# Patient Record
Sex: MALE | Race: BLACK OR AFRICAN AMERICAN | Employment: OTHER | URBAN - METROPOLITAN AREA
[De-identification: names, ages, dates, MRNs, and addresses within clinical notes are randomized per-mention and may not be internally consistent; named-entity substitution may affect disease eponyms.]

---

## 2017-01-01 ENCOUNTER — IP HISTORICAL/CONVERTED ENCOUNTER (OUTPATIENT)
Dept: OTHER | Age: 67
End: 2017-01-01

## 2017-04-21 ENCOUNTER — OP HISTORICAL/CONVERTED ENCOUNTER (OUTPATIENT)
Dept: OTHER | Age: 67
End: 2017-04-21

## 2019-04-18 ENCOUNTER — OP HISTORICAL/CONVERTED ENCOUNTER (OUTPATIENT)
Dept: OTHER | Age: 69
End: 2019-04-18

## 2020-08-29 ENCOUNTER — HOSPITAL ENCOUNTER (INPATIENT)
Age: 70
LOS: 30 days | Discharge: NURSING, REHAB OR LONG-TERM CARE | DRG: 137 | End: 2020-09-28
Attending: FAMILY MEDICINE | Admitting: FAMILY MEDICINE
Payer: MEDICAID

## 2020-08-29 ENCOUNTER — IP HISTORICAL/CONVERTED ENCOUNTER (OUTPATIENT)
Dept: OTHER | Age: 70
End: 2020-08-29

## 2020-08-29 PROCEDURE — 99999999999 HC CONV PATIENT CONVENIENCE-OTHER

## 2020-08-29 PROCEDURE — 82553 CREATINE MB FRACTION: CPT

## 2020-08-29 PROCEDURE — U0002 COVID-19 LAB TEST NON-CDC: HCPCS

## 2020-08-29 PROCEDURE — 93005 ELECTROCARDIOGRAM TRACING: CPT

## 2020-08-29 PROCEDURE — 84484 ASSAY OF TROPONIN QUANT: CPT

## 2020-08-29 PROCEDURE — 94760 N-INVAS EAR/PLS OXIMETRY 1: CPT

## 2020-08-29 PROCEDURE — 83605 ASSAY OF LACTIC ACID: CPT

## 2020-08-29 PROCEDURE — 36415 COLL VENOUS BLD VENIPUNCTURE: CPT

## 2020-08-29 PROCEDURE — 81003 URINALYSIS AUTO W/O SCOPE: CPT

## 2020-08-29 PROCEDURE — 82805 BLOOD GASES W/O2 SATURATION: CPT

## 2020-08-29 PROCEDURE — 80053 COMPREHEN METABOLIC PANEL: CPT

## 2020-08-29 PROCEDURE — 85610 PROTHROMBIN TIME: CPT

## 2020-08-29 PROCEDURE — 80061 LIPID PANEL: CPT

## 2020-08-29 PROCEDURE — 85025 COMPLETE CBC W/AUTO DIFF WBC: CPT

## 2020-08-29 PROCEDURE — 87040 BLOOD CULTURE FOR BACTERIA: CPT

## 2020-08-29 PROCEDURE — 84443 ASSAY THYROID STIM HORMONE: CPT

## 2020-08-29 PROCEDURE — 71045 X-RAY EXAM CHEST 1 VIEW: CPT

## 2020-08-29 PROCEDURE — 84145 PROCALCITONIN (PCT): CPT

## 2020-08-29 PROCEDURE — 82550 ASSAY OF CK (CPK): CPT

## 2020-08-29 PROCEDURE — 83036 HEMOGLOBIN GLYCOSYLATED A1C: CPT

## 2020-08-29 PROCEDURE — 83880 ASSAY OF NATRIURETIC PEPTIDE: CPT

## 2020-08-30 PROCEDURE — 80053 COMPREHEN METABOLIC PANEL: CPT

## 2020-08-30 PROCEDURE — 86355 B CELLS TOTAL COUNT: CPT

## 2020-08-30 PROCEDURE — 80074 ACUTE HEPATITIS PANEL: CPT

## 2020-08-30 PROCEDURE — 83605 ASSAY OF LACTIC ACID: CPT

## 2020-08-30 PROCEDURE — 86360 T CELL ABSOLUTE COUNT/RATIO: CPT

## 2020-08-30 PROCEDURE — 87040 BLOOD CULTURE FOR BACTERIA: CPT

## 2020-08-30 PROCEDURE — 99999999999 HC CONV PATIENT CONVENIENCE-OTHER

## 2020-08-30 PROCEDURE — 36415 COLL VENOUS BLD VENIPUNCTURE: CPT

## 2020-08-30 PROCEDURE — 87536 HIV-1 QUANT&REVRSE TRNSCRPJ: CPT

## 2020-08-30 PROCEDURE — 87086 URINE CULTURE/COLONY COUNT: CPT

## 2020-08-30 PROCEDURE — 86140 C-REACTIVE PROTEIN: CPT

## 2020-08-30 PROCEDURE — 85610 PROTHROMBIN TIME: CPT

## 2020-08-30 PROCEDURE — 86359 T CELLS TOTAL COUNT: CPT

## 2020-08-30 PROCEDURE — 85025 COMPLETE CBC W/AUTO DIFF WBC: CPT

## 2020-08-31 PROCEDURE — 82805 BLOOD GASES W/O2 SATURATION: CPT

## 2020-08-31 PROCEDURE — 36415 COLL VENOUS BLD VENIPUNCTURE: CPT

## 2020-08-31 PROCEDURE — 80053 COMPREHEN METABOLIC PANEL: CPT

## 2020-08-31 PROCEDURE — 86900 BLOOD TYPING SEROLOGIC ABO: CPT

## 2020-08-31 PROCEDURE — 86140 C-REACTIVE PROTEIN: CPT

## 2020-08-31 PROCEDURE — 85025 COMPLETE CBC W/AUTO DIFF WBC: CPT

## 2020-08-31 PROCEDURE — 94762 N-INVAS EAR/PLS OXIMTRY CONT: CPT

## 2020-08-31 PROCEDURE — 71045 X-RAY EXAM CHEST 1 VIEW: CPT

## 2020-08-31 PROCEDURE — 87641 MR-STAPH DNA AMP PROBE: CPT

## 2020-08-31 PROCEDURE — 82728 ASSAY OF FERRITIN: CPT

## 2020-08-31 PROCEDURE — 86901 BLOOD TYPING SEROLOGIC RH(D): CPT

## 2020-08-31 PROCEDURE — 99999999999 HC CONV PATIENT CONVENIENCE-OTHER

## 2020-08-31 PROCEDURE — 99285 EMERGENCY DEPT VISIT HI MDM: CPT

## 2020-09-01 PROCEDURE — 99999999999 HC CONV PATIENT CONVENIENCE-OTHER

## 2020-09-01 PROCEDURE — 82962 GLUCOSE BLOOD TEST: CPT

## 2020-09-02 PROCEDURE — 99999999999 HC CONV PATIENT CONVENIENCE-OTHER

## 2020-09-02 PROCEDURE — 86705 HEP B CORE ANTIBODY IGM: CPT

## 2020-09-02 PROCEDURE — 86709 HEPATITIS A IGM ANTIBODY: CPT

## 2020-09-02 PROCEDURE — 85025 COMPLETE CBC W/AUTO DIFF WBC: CPT

## 2020-09-02 PROCEDURE — 87340 HEPATITIS B SURFACE AG IA: CPT

## 2020-09-02 PROCEDURE — 86803 HEPATITIS C AB TEST: CPT

## 2020-09-02 PROCEDURE — 86140 C-REACTIVE PROTEIN: CPT

## 2020-09-03 PROCEDURE — 87070 CULTURE OTHR SPECIMN AEROBIC: CPT

## 2020-09-03 PROCEDURE — 99999999999 HC CONV PATIENT CONVENIENCE-OTHER

## 2020-09-03 PROCEDURE — 71045 X-RAY EXAM CHEST 1 VIEW: CPT

## 2020-09-03 PROCEDURE — 87205 SMEAR GRAM STAIN: CPT

## 2020-09-03 PROCEDURE — 82728 ASSAY OF FERRITIN: CPT

## 2020-09-03 PROCEDURE — 83615 LACTATE (LD) (LDH) ENZYME: CPT

## 2020-09-04 PROCEDURE — U0002 COVID-19 LAB TEST NON-CDC: HCPCS

## 2020-09-04 PROCEDURE — 99999999999 HC CONV PATIENT CONVENIENCE-OTHER

## 2020-09-04 PROCEDURE — 80053 COMPREHEN METABOLIC PANEL: CPT

## 2020-09-05 PROCEDURE — 94762 N-INVAS EAR/PLS OXIMTRY CONT: CPT

## 2020-09-05 PROCEDURE — 99999999999 HC CONV PATIENT CONVENIENCE-OTHER

## 2020-09-06 LAB — SARS-COV-2, NAA: ABNORMAL

## 2020-09-06 PROCEDURE — 84520 ASSAY OF UREA NITROGEN: CPT

## 2020-09-06 PROCEDURE — 82565 ASSAY OF CREATININE: CPT

## 2020-09-06 PROCEDURE — 94762 N-INVAS EAR/PLS OXIMTRY CONT: CPT

## 2020-09-06 PROCEDURE — 87186 SC STD MICRODIL/AGAR DIL: CPT

## 2020-09-06 PROCEDURE — 87077 CULTURE AEROBIC IDENTIFY: CPT

## 2020-09-06 PROCEDURE — 99999999999 HC CONV PATIENT CONVENIENCE-OTHER

## 2020-09-07 PROCEDURE — 85025 COMPLETE CBC W/AUTO DIFF WBC: CPT

## 2020-09-07 PROCEDURE — 94760 N-INVAS EAR/PLS OXIMETRY 1: CPT

## 2020-09-07 PROCEDURE — 86140 C-REACTIVE PROTEIN: CPT

## 2020-09-07 PROCEDURE — 99999999999 HC CONV PATIENT CONVENIENCE-OTHER

## 2020-09-07 PROCEDURE — 80202 ASSAY OF VANCOMYCIN: CPT

## 2020-09-07 PROCEDURE — 82565 ASSAY OF CREATININE: CPT

## 2020-09-07 PROCEDURE — 83615 LACTATE (LD) (LDH) ENZYME: CPT

## 2020-09-07 PROCEDURE — 84520 ASSAY OF UREA NITROGEN: CPT

## 2020-09-07 PROCEDURE — 94762 N-INVAS EAR/PLS OXIMTRY CONT: CPT

## 2020-09-07 PROCEDURE — 36415 COLL VENOUS BLD VENIPUNCTURE: CPT

## 2020-09-08 PROCEDURE — 36415 COLL VENOUS BLD VENIPUNCTURE: CPT

## 2020-09-08 PROCEDURE — 94760 N-INVAS EAR/PLS OXIMETRY 1: CPT

## 2020-09-08 PROCEDURE — 85025 COMPLETE CBC W/AUTO DIFF WBC: CPT

## 2020-09-08 PROCEDURE — 80048 BASIC METABOLIC PNL TOTAL CA: CPT

## 2020-09-08 PROCEDURE — 99999999999 HC CONV PATIENT CONVENIENCE-OTHER

## 2020-09-09 PROCEDURE — 94760 N-INVAS EAR/PLS OXIMETRY 1: CPT

## 2020-09-09 PROCEDURE — 84133 ASSAY OF URINE POTASSIUM: CPT

## 2020-09-09 PROCEDURE — 82570 ASSAY OF URINE CREATININE: CPT

## 2020-09-09 PROCEDURE — 82436 ASSAY OF URINE CHLORIDE: CPT

## 2020-09-09 PROCEDURE — 94762 N-INVAS EAR/PLS OXIMTRY CONT: CPT

## 2020-09-09 PROCEDURE — 84300 ASSAY OF URINE SODIUM: CPT

## 2020-09-09 PROCEDURE — 80202 ASSAY OF VANCOMYCIN: CPT

## 2020-09-09 PROCEDURE — 99999999999 HC CONV PATIENT CONVENIENCE-OTHER

## 2020-09-09 PROCEDURE — 84156 ASSAY OF PROTEIN URINE: CPT

## 2020-09-10 PROCEDURE — 80048 BASIC METABOLIC PNL TOTAL CA: CPT

## 2020-09-10 PROCEDURE — 99999999999 HC CONV PATIENT CONVENIENCE-OTHER

## 2020-09-10 PROCEDURE — 71045 X-RAY EXAM CHEST 1 VIEW: CPT

## 2020-09-10 PROCEDURE — 94760 N-INVAS EAR/PLS OXIMETRY 1: CPT

## 2020-09-10 PROCEDURE — 85025 COMPLETE CBC W/AUTO DIFF WBC: CPT

## 2020-09-11 PROCEDURE — 99999999999 HC CONV PATIENT CONVENIENCE-OTHER

## 2020-09-11 PROCEDURE — 80202 ASSAY OF VANCOMYCIN: CPT

## 2020-09-11 PROCEDURE — 80069 RENAL FUNCTION PANEL: CPT

## 2020-09-11 PROCEDURE — 94762 N-INVAS EAR/PLS OXIMTRY CONT: CPT

## 2020-09-11 RX ORDER — ACETAMINOPHEN 650 MG/1
650 SUPPOSITORY RECTAL
Status: DISCONTINUED | OUTPATIENT
Start: 2020-09-12 | End: 2020-09-28 | Stop reason: HOSPADM

## 2020-09-11 RX ORDER — ALBUTEROL SULFATE 2.5 MG/.5ML
2.5 SOLUTION RESPIRATORY (INHALATION) ONCE
COMMUNITY
End: 2020-09-28

## 2020-09-11 RX ORDER — ACETAMINOPHEN 325 MG/1
650 TABLET ORAL
COMMUNITY
End: 2020-09-28

## 2020-09-11 RX ORDER — LANOLIN ALCOHOL/MO/W.PET/CERES
1000 CREAM (GRAM) TOPICAL DAILY
COMMUNITY

## 2020-09-11 RX ORDER — ASPIRIN 325 MG/1
50 TABLET, FILM COATED ORAL DAILY
Status: DISCONTINUED | OUTPATIENT
Start: 2020-09-12 | End: 2020-09-28 | Stop reason: HOSPADM

## 2020-09-11 RX ORDER — PREDNISONE 20 MG/1
20 TABLET ORAL DAILY
Status: DISCONTINUED | OUTPATIENT
Start: 2020-09-12 | End: 2020-09-28 | Stop reason: HOSPADM

## 2020-09-11 RX ORDER — ENOXAPARIN SODIUM 100 MG/ML
40 INJECTION SUBCUTANEOUS EVERY 24 HOURS
Status: DISCONTINUED | OUTPATIENT
Start: 2020-09-12 | End: 2020-09-28 | Stop reason: HOSPADM

## 2020-09-11 RX ORDER — PANTOPRAZOLE SODIUM 40 MG/1
40 TABLET, DELAYED RELEASE ORAL
Status: DISCONTINUED | OUTPATIENT
Start: 2020-09-12 | End: 2020-09-25

## 2020-09-11 RX ORDER — EMTRICITABINE AND TENOFOVIR DISOPROXIL FUMARATE 200; 300 MG/1; MG/1
1 TABLET, FILM COATED ORAL DAILY
COMMUNITY

## 2020-09-11 RX ORDER — ACETAMINOPHEN 325 MG/1
650 TABLET ORAL
Status: DISCONTINUED | OUTPATIENT
Start: 2020-09-12 | End: 2020-09-28 | Stop reason: HOSPADM

## 2020-09-11 RX ORDER — LANOLIN ALCOHOL/MO/W.PET/CERES
1000 CREAM (GRAM) TOPICAL DAILY
Status: DISCONTINUED | OUTPATIENT
Start: 2020-09-12 | End: 2020-09-28 | Stop reason: HOSPADM

## 2020-09-11 RX ORDER — ALBUTEROL SULFATE 0.83 MG/ML
2.5 SOLUTION RESPIRATORY (INHALATION)
COMMUNITY

## 2020-09-11 RX ORDER — ZIPRASIDONE HYDROCHLORIDE 40 MG/1
40 CAPSULE ORAL 2 TIMES DAILY WITH MEALS
Status: DISCONTINUED | OUTPATIENT
Start: 2020-09-12 | End: 2020-09-28 | Stop reason: HOSPADM

## 2020-09-11 RX ORDER — LANSOPRAZOLE 30 MG/1
30 CAPSULE, DELAYED RELEASE ORAL
COMMUNITY

## 2020-09-11 RX ORDER — UREA 10 %
50 LOTION (ML) TOPICAL DAILY
COMMUNITY

## 2020-09-11 RX ORDER — FENTANYL 25 UG/1
1 PATCH TRANSDERMAL
COMMUNITY
End: 2020-09-28

## 2020-09-11 RX ORDER — EMTRICITABINE AND TENOFOVIR DISOPROXIL FUMARATE 200; 300 MG/1; MG/1
1 TABLET, FILM COATED ORAL DAILY
Status: DISCONTINUED | OUTPATIENT
Start: 2020-09-12 | End: 2020-09-28 | Stop reason: HOSPADM

## 2020-09-12 PROBLEM — R06.02 SOB (SHORTNESS OF BREATH): Status: ACTIVE | Noted: 2020-09-12

## 2020-09-12 LAB
ANION GAP SERPL CALC-SCNC: 8 MMOL/L (ref 5–15)
BASOPHILS # BLD: 0 K/UL (ref 0–0.1)
BASOPHILS NFR BLD: 0 % (ref 0–1)
BUN SERPL-MCNC: 15 MG/DL (ref 6–20)
BUN/CREAT SERPL: 19 (ref 12–20)
CA-I BLD-MCNC: 8.5 MG/DL (ref 8.5–10.1)
CHLORIDE SERPL-SCNC: 108 MMOL/L (ref 97–108)
CO2 SERPL-SCNC: 28 MMOL/L (ref 21–32)
CREAT SERPL-MCNC: 0.78 MG/DL (ref 0.7–1.3)
CRP SERPL-MCNC: <0.29 MG/DL (ref 0–0.6)
DIFFERENTIAL METHOD BLD: ABNORMAL
EOSINOPHIL # BLD: 0.1 K/UL (ref 0–0.4)
EOSINOPHIL NFR BLD: 2 % (ref 0–7)
ERYTHROCYTE [DISTWIDTH] IN BLOOD BY AUTOMATED COUNT: 14.6 % (ref 11.5–14.5)
GLUCOSE BLD STRIP.AUTO-MCNC: 177 MG/DL (ref 65–100)
GLUCOSE SERPL-MCNC: 139 MG/DL (ref 65–100)
HCT VFR BLD AUTO: 38.3 % (ref 36.6–50.3)
HGB BLD-MCNC: 12.5 % (ref 12.1–17)
IMM GRANULOCYTES # BLD AUTO: 0 K/UL (ref 0–0.04)
IMM GRANULOCYTES NFR BLD AUTO: 0 % (ref 0–0.5)
LYMPHOCYTES # BLD: 2 K/UL (ref 0.8–3.5)
LYMPHOCYTES NFR BLD: 42 % (ref 12–49)
MCH RBC QN AUTO: 30.9 PG (ref 26–34)
MCHC RBC AUTO-ENTMCNC: 32.6 G/DL (ref 30–36.5)
MCV RBC AUTO: 94.8 FL (ref 80–99)
MONOCYTES # BLD: 0.3 K/UL (ref 0–1)
MONOCYTES NFR BLD: 7 % (ref 5–13)
NEUTS SEG # BLD: 2.3 K/UL (ref 1.8–8)
NEUTS SEG NFR BLD: 49 % (ref 32–75)
PERFORMED BY, TECHID: ABNORMAL
PLATELET # BLD AUTO: 97 K/UL (ref 150–400)
PMV BLD AUTO: 12.4 FL (ref 8.9–12.9)
POTASSIUM SERPL-SCNC: 3.3 MMOL/L (ref 3.5–5.1)
RBC # BLD AUTO: 4.04 M/UL (ref 4.1–5.7)
SODIUM SERPL-SCNC: 144 MMOL/L (ref 136–145)
WBC # BLD AUTO: 4.7 K/UL (ref 4.1–11.1)

## 2020-09-12 PROCEDURE — 80048 BASIC METABOLIC PNL TOTAL CA: CPT

## 2020-09-12 PROCEDURE — 86140 C-REACTIVE PROTEIN: CPT

## 2020-09-12 PROCEDURE — 85025 COMPLETE CBC W/AUTO DIFF WBC: CPT

## 2020-09-12 PROCEDURE — 65270000029 HC RM PRIVATE

## 2020-09-12 PROCEDURE — 74011000258 HC RX REV CODE- 258: Performed by: FAMILY MEDICINE

## 2020-09-12 PROCEDURE — 36415 COLL VENOUS BLD VENIPUNCTURE: CPT

## 2020-09-12 PROCEDURE — 94762 N-INVAS EAR/PLS OXIMTRY CONT: CPT

## 2020-09-12 PROCEDURE — 82962 GLUCOSE BLOOD TEST: CPT

## 2020-09-12 PROCEDURE — 74011636637 HC RX REV CODE- 636/637: Performed by: FAMILY MEDICINE

## 2020-09-12 PROCEDURE — 74011250637 HC RX REV CODE- 250/637: Performed by: FAMILY MEDICINE

## 2020-09-12 PROCEDURE — 74011250636 HC RX REV CODE- 250/636: Performed by: FAMILY MEDICINE

## 2020-09-12 RX ADMIN — VANCOMYCIN HYDROCHLORIDE 1250 MG: 1.25 INJECTION, POWDER, LYOPHILIZED, FOR SOLUTION INTRAVENOUS at 05:00

## 2020-09-12 RX ADMIN — PANTOPRAZOLE SODIUM 40 MG: 40 TABLET, DELAYED RELEASE ORAL at 10:26

## 2020-09-12 RX ADMIN — VANCOMYCIN HYDROCHLORIDE 1250 MG: 1.25 INJECTION, POWDER, LYOPHILIZED, FOR SOLUTION INTRAVENOUS at 17:49

## 2020-09-12 RX ADMIN — PIPERACILLIN SODIUM AND TAZOBACTAM SODIUM 3.38 G: 3; .375 INJECTION, POWDER, LYOPHILIZED, FOR SOLUTION INTRAVENOUS at 17:52

## 2020-09-12 RX ADMIN — CYANOCOBALAMIN TAB 1000 MCG 1000 MCG: 1000 TAB at 10:26

## 2020-09-12 RX ADMIN — PREDNISONE 20 MG: 20 TABLET ORAL at 10:27

## 2020-09-12 RX ADMIN — RALTEGRAVIR 400 MG: 400 TABLET, FILM COATED ORAL at 17:50

## 2020-09-12 RX ADMIN — ZIPRASIDONE HCL 40 MG: 40 CAPSULE ORAL at 17:50

## 2020-09-12 RX ADMIN — ENOXAPARIN SODIUM 40 MG: 40 INJECTION SUBCUTANEOUS at 05:00

## 2020-09-12 RX ADMIN — PIPERACILLIN SODIUM AND TAZOBACTAM SODIUM 3.38 G: 3; .375 INJECTION, POWDER, LYOPHILIZED, FOR SOLUTION INTRAVENOUS at 22:40

## 2020-09-12 RX ADMIN — EMTRICITABINE AND TENOFOVIR DISOPROXIL FUMARATE 1 TABLET: 200; 300 TABLET, FILM COATED ORAL at 09:00

## 2020-09-12 RX ADMIN — ZIPRASIDONE HCL 40 MG: 40 CAPSULE ORAL at 10:26

## 2020-09-12 RX ADMIN — THIAMINE HCL TAB 100 MG 50 MG: 100 TAB at 10:26

## 2020-09-12 RX ADMIN — RALTEGRAVIR 400 MG: 400 TABLET, FILM COATED ORAL at 10:25

## 2020-09-12 RX ADMIN — PIPERACILLIN SODIUM AND TAZOBACTAM SODIUM 3.38 G: 3; .375 INJECTION, POWDER, LYOPHILIZED, FOR SOLUTION INTRAVENOUS at 05:00

## 2020-09-12 NOTE — PROGRESS NOTES
Pulmonary Progress Note    Subjective:   Daily Progress Note: 2020 1:58 PM    CC: From here arm diabetes with fever aspiration    HPI: 71-year-old male came in with shortness of breath, Denise Ray he was COVID positive also is HIV positive      Review of Systems  A comprehensive review of systems was negative except for that written in the HPI. Objective:     Visit Vitals  BP (!) 149/57   Pulse (!) 58   Temp 97 °F (36.1 °C)   Resp 18   Ht 5' 10\" (1.778 m)   Wt 85.8 kg (189 lb 2.5 oz)   SpO2 100%   BMI 27.14 kg/m²           Temp (24hrs), Av.7 °F (36.5 °C), Min:97 °F (36.1 °C), Max:98.2 °F (36.8 °C)      No intake/output data recorded. No intake/output data recorded. Visit Vitals  BP (!) 149/57   Pulse (!) 58   Temp 97 °F (36.1 °C)   Resp 18   Ht 5' 10\" (1.778 m)   Wt 85.8 kg (189 lb 2.5 oz)   SpO2 100%   BMI 27.14 kg/m²     General:  Alert, cooperative, no distress, appears stated age. Head:  Normocephalic, without obvious abnormality, atraumatic. Eyes:  Conjunctivae/corneas clear. PERRL, EOMs intact. Fundi benign   Ears:  Normal TMs and external ear canals both ears. Nose: Nares normal. Septum midline. Mucosa normal. No drainage or sinus tenderness. Throat: Lips, mucosa, and tongue normal. Teeth and gums normal.   Neck: Supple, symmetrical, trachea midline, no adenopathy, thyroid: no enlargement/tenderness/nodules, no carotid bruit and no JVD. Back:   Symmetric, no curvature. ROM normal. No CVA tenderness. Lungs:   Clear to auscultation bilaterally. Chest wall:  No tenderness or deformity. Heart:  Regular rate and rhythm, S1, S2 normal, no murmur, click, rub or gallop. Abdomen:   Soft, non-tender. Bowel sounds normal. No masses,  No organomegaly. Genitalia:  Normal male without lesion, discharge or tenderness. Rectal:  Normal tone, normal prostate, no masses or tenderness  Guaiac negative stool. Extremities: Extremities normal, atraumatic, no cyanosis or edema.    Pulses: 2+ and symmetric all extremities. Skin: Skin color, texture, turgor normal. No rashes or lesions   Lymph nodes: Cervical, supraclavicular, and axillary nodes normal.   Neurologic: CNII-XII intact. Normal strength, sensation and reflexes throughout. Data Review    Recent Results (from the past 24 hour(s))   METABOLIC PANEL, BASIC    Collection Time: 09/12/20  8:06 AM   Result Value Ref Range    Sodium 144 136 - 145 mmol/L    Potassium 3.3 (L) 3.5 - 5.1 mmol/L    Chloride 108 97 - 108 mmol/L    CO2 28 21 - 32 mmol/L    Anion gap 8 5 - 15 mmol/L    Glucose 139 (H) 65 - 100 mg/dL    BUN 15 6 - 20 mg/dL    Creatinine 0.78 0.70 - 1.30 mg/dL    BUN/Creatinine ratio 19 12 - 20      GFR est AA >60 >60 ml/min/1.73m2    GFR est non-AA >60 >60 ml/min/1.73m2    Calcium 8.5 8.5 - 10.1 mg/dL   CBC WITH AUTOMATED DIFF    Collection Time: 09/12/20  8:06 AM   Result Value Ref Range    WBC 4.7 4.1 - 11.1 K/uL    RBC 4.04 (L) 4.10 - 5.70 M/uL    HGB 12.5 12.1 - 17.0 %    HCT 38.3 36.6 - 50.3 %    MCV 94.8 80.0 - 99.0 FL    MCH 30.9 26.0 - 34.0 PG    MCHC 32.6 30.0 - 36.5 g/dL    RDW 14.6 (H) 11.5 - 14.5 %    PLATELET 97 (L) 933 - 400 K/uL    MPV 12.4 8.9 - 12.9 FL    NEUTROPHILS 49 32 - 75 %    LYMPHOCYTES 42 12 - 49 %    MONOCYTES 7 5 - 13 %    EOSINOPHILS 2 0 - 7 %    BASOPHILS 0 0 - 1 %    IMMATURE GRANULOCYTES 0 0.0 - 0.5 %    ABS. NEUTROPHILS 2.3 1.8 - 8.0 K/UL    ABS. LYMPHOCYTES 2.0 0.8 - 3.5 K/UL    ABS. MONOCYTES 0.3 0.0 - 1.0 K/UL    ABS. EOSINOPHILS 0.1 0.0 - 0.4 K/UL    ABS. BASOPHILS 0.0 0.0 - 0.1 K/UL    ABS. IMM.  GRANS. 0.0 0.00 - 0.04 K/UL    DF AUTOMATED     C REACTIVE PROTEIN, QT    Collection Time: 09/12/20  8:06 AM   Result Value Ref Range    C-Reactive protein <0.29 0.00 - 0.60 mg/dL       Current Facility-Administered Medications   Medication Dose Route Frequency    cyanocobalamin tablet 1,000 mcg  1,000 mcg Oral DAILY    emtricitabine-tenofovir (TDF) (TRUVADA) 200-300 mg per tablet 1 Tab  1 Tab Oral DAILY    enoxaparin (LOVENOX) injection 40 mg  40 mg SubCUTAneous Q24H    pantoprazole (PROTONIX) tablet 40 mg  40 mg Oral ACB    piperacillin-tazobactam (ZOSYN) 3.375 g in 0.9% sodium chloride (MBP/ADV) 100 mL MBP  3.375 g IntraVENous Q8H    predniSONE (DELTASONE) tablet 20 mg  20 mg Per G Tube DAILY    raltegravir (ISENTRESS) tablet 400 mg  400 mg Oral BID    VANCOMYCIN INFORMATION NOTE   Other Rx Dosing/Monitoring    thiamine mononitrate (B-1) tablet 50 mg  50 mg Oral DAILY    vancomycin (VANCOCIN) 1,250 mg in 0.9% sodium chloride 250 mL IVPB  1,250 mg IntraVENous Q12H    ziprasidone (GEODON) capsule 40 mg  40 mg Per G Tube BID WITH MEALS    acetaminophen (TYLENOL) suppository 650 mg  650 mg Rectal Q4H PRN    acetaminophen (TYLENOL) tablet 650 mg  650 mg Oral Q6H PRN         Assessment/Plan:     COVID-19 pneumonia  Acute  hypoxic respiratory failure  HIV positive  Continue to wean oxygen per protocol  Patient was treated with Zosyn Lovenox dexamethasone Zithromax and to Actemra

## 2020-09-12 NOTE — PROGRESS NOTES
[facility] [place] [facility]     Renal Daily Progress Note:     Admission Date: 2020     Subjective: Remains nonresponsive, mouth open, creatinine and sodium are normalized      Review of Systems  Review of systems not obtained due to patient factors. Objective:     Visit Vitals  BP (!) 149/57   Pulse (!) 58   Temp 97 °F (36.1 °C)   Resp 18   Ht 5' 10\" (1.778 m)   Wt 85.8 kg (189 lb 2.5 oz)   SpO2 100%   BMI 27.14 kg/m²     Temp (24hrs), Av.7 °F (36.5 °C), Min:97 °F (36.1 °C), Max:98.2 °F (36.8 °C)      No intake or output data in the 24 hours ending 20 9813  Current Facility-Administered Medications   Medication Dose Route Frequency    cyanocobalamin tablet 1,000 mcg  1,000 mcg Oral DAILY    emtricitabine-tenofovir (TDF) (TRUVADA) 200-300 mg per tablet 1 Tab  1 Tab Oral DAILY    enoxaparin (LOVENOX) injection 40 mg  40 mg SubCUTAneous Q24H    pantoprazole (PROTONIX) tablet 40 mg  40 mg Oral ACB    piperacillin-tazobactam (ZOSYN) 3.375 g in 0.9% sodium chloride (MBP/ADV) 100 mL MBP  3.375 g IntraVENous Q8H    predniSONE (DELTASONE) tablet 20 mg  20 mg Per G Tube DAILY    raltegravir (ISENTRESS) tablet 400 mg  400 mg Oral BID    VANCOMYCIN INFORMATION NOTE   Other Rx Dosing/Monitoring    thiamine mononitrate (B-1) tablet 50 mg  50 mg Oral DAILY    vancomycin (VANCOCIN) 1,250 mg in 0.9% sodium chloride 250 mL IVPB  1,250 mg IntraVENous Q12H    ziprasidone (GEODON) capsule 40 mg  40 mg Per G Tube BID WITH MEALS    acetaminophen (TYLENOL) suppository 650 mg  650 mg Rectal Q4H PRN    acetaminophen (TYLENOL) tablet 650 mg  650 mg Oral Q6H PRN       Physical Exam:General appearance: no distress, appears older than stated age, eyes open, does not interact  Head: Normocephalic, without obvious abnormality, atraumatic, temporal wasting  Lungs: clear to auscultation bilaterally  Heart: regular rate and rhythm  Abdomen: soft, non-tender.  Bowel sounds normal. No masses,  no organomegaly, PEG  Extremities: no edema  Neurologic: Mental status: eyes open, withdraws to pain; otherwise, not interactive    Data Review:     LABS:  Recent Labs     09/12/20  0806      K 3.3*      CO2 28   BUN 15   CREA 0.78   CA 8.5     Recent Labs     09/12/20  0806   WBC 4.7   HGB 12.5   HCT 38.3   PLT 97*     No results for input(s): DEANN, KU, CLU, CREAU in the last 72 hours.     No lab exists for component: PROU    Assessment:   Renal Specific Problems          Plan:     Obtain/ Order: labs/cultures/radiology/procedures:  Serial BMP's        Therapeutic:    discontinue IV fluids, use tube feeds        Azalea Pearce MD    [phone]

## 2020-09-12 NOTE — ROUTINE PROCESS
Bedside and Verbal shift change report given to Siena Ivy RN (oncoming nurse) by Stacy Rabago RN (offgoing nurse). Report included the following information SBAR, MAR and Med Rec Status.

## 2020-09-12 NOTE — PROGRESS NOTES
Progress Note  Date:2020       Room:[facility]  Patient Name:Ronnell Diaz     YOB: 1950     Age:69 y.o. Subjective    Subjective     Patient open eyes not following any commands still hypoxic on 4 L oxygen by nasal cannula COVID screening positive sputum culture positive for Klebsiella pneumonia Proteus mirabilis and MRSA on vancomycin and Zosyn  Review of Systems   Unable to perform ROS: Dementia     Objective         Vitals Last 24 Hours:  TEMPERATURE:  Temp  Av.7 °F (36.5 °C)  Min: 97 °F (36.1 °C)  Max: 98.2 °F (36.8 °C)  RESPIRATIONS RANGE: Resp  Av.8  Min: 17  Max: 18  PULSE OXIMETRY RANGE: SpO2  Av.3 %  Min: 92 %  Max: 100 %  PULSE RANGE: Pulse  Av.7  Min: 58  Max: 69  BLOOD PRESSURE RANGE: Systolic (59JVM), UVF:054 , Min:113 , YAY:322   ; Diastolic (75COH), GABE:73, Min:57, Max:78      I/O (24Hr): No intake or output data in the 24 hours ending 20  Objective:  General Appearance:  Uncomfortable. Vital signs: (most recent): Blood pressure (!) 149/57, pulse (!) 58, temperature 97 °F (36.1 °C), resp. rate 18, height 5' 10\" (1.778 m), weight 85.8 kg (189 lb 2.5 oz), SpO2 100 %. Vital signs are normal.    HEENT: Normal HEENT exam.    Lungs: Tachypnea and increased effort. Breath sounds clear to auscultation. Heart: Normal rate. Regular rhythm. S1 normal and S2 normal.    Chest: Symmetric chest wall expansion. Abdomen: Abdomen is soft and flat. Bowel sounds are normal.   There is no abdominal tenderness. Extremities: Normal range of motion. Pulses: Distal pulses are intact. Neurological: Patient is alert and oriented to person, place and time. Pupils:  Pupils are equal, round, and reactive to light. Skin:  Warm.       Labs/Imaging/Diagnostics    Labs:  CBC:  Recent Labs     20  0806   WBC 4.7   RBC 4.04*   HGB 12.5   HCT 38.3   MCV 94.8   RDW 14.6*   PLT 97*     CHEMISTRIES:  Recent Labs     20  0806      K 3.3*   CL 108   CO2 28   BUN 15   CA 8.5   PT/INR:No results for input(s): INR, INREXT in the last 72 hours. No lab exists for component: PROTIME  APTT:No results for input(s): APTT in the last 72 hours. LIVER PROFILE:No results for input(s): AST, ALT in the last 72 hours. No lab exists for component: BILIDIR, BILITOT, ALKPHOS  No results found for: ALT, AST, GGT, GGTP, AP, APIT, APX, CBIL, TBIL, TBILI    Imaging Last 24 Hours:  No results found. Assessment//Plan         There are no active problems to display for this patient.     Assessment & Plan    Acute hypoxemic respiratory failure on 4 L  Pneumonia with Klebsiella pneumonia Proteus mirabilis and MRSA on IV vancomycin and Zosyn  COVID-19 pneumonitis  COVID-19 not due to HIV  Severe dehydration  HIV disease  Hepatitis B  History of neurosyphilis  History of transitional cell bladder cancer  Chronic pain syndrome  Thrombocytopenia  Neutropenia  Hyponatremia  No sepsis      Current Facility-Administered Medications:     cyanocobalamin tablet 1,000 mcg, 1,000 mcg, Oral, DAILY, Barry Villasenor MD, 1,000 mcg at 09/12/20 1026    emtricitabine-tenofovir (TDF) (TRUVADA) 200-300 mg per tablet 1 Tab, 1 Tab, Oral, DAILY, Barry Villasenor MD, 1 Tab at 09/12/20 0900    enoxaparin (LOVENOX) injection 40 mg, 40 mg, SubCUTAneous, Q24H, Barry Villasenor MD, 40 mg at 09/12/20 0500    pantoprazole (PROTONIX) tablet 40 mg, 40 mg, Oral, ACB, Barry Villasenor MD, 40 mg at 09/12/20 1026    piperacillin-tazobactam (ZOSYN) 3.375 g in 0.9% sodium chloride (MBP/ADV) 100 mL MBP, 3.375 g, IntraVENous, Q8H, Barry Villasenor MD, Last Rate: 25 mL/hr at 09/12/20 1752, 3.375 g at 09/12/20 1752    predniSONE (DELTASONE) tablet 20 mg, 20 mg, Per G Tube, DAILY, Barry Villasenor MD, 20 mg at 09/12/20 1027    raltegravir (ISENTRESS) tablet 400 mg, 400 mg, Oral, BID, Barry Villasenor MD, 400 mg at 09/12/20 1750    VANCOMYCIN INFORMATION NOTE, , Other, Rx Dosing/Monitoring, Jacklyn, Ke Oswald MD    thiamine mononitrate (B-1) tablet 50 mg, 50 mg, Oral, DAILY, Barry Villasenor MD, 50 mg at 09/12/20 1026    vancomycin (VANCOCIN) 1,250 mg in 0.9% sodium chloride 250 mL IVPB, 1,250 mg, IntraVENous, Q12H, Barry Villasenor MD, Last Rate: 250 mL/hr at 09/12/20 1749, 1,250 mg at 09/12/20 1749    ziprasidone (GEODON) capsule 40 mg, 40 mg, Per G Tube, BID WITH MEALS, Barry Villasenor MD, 40 mg at 09/12/20 1750    acetaminophen (TYLENOL) suppository 650 mg, 650 mg, Rectal, Q4H PRN, Barry Villasenor MD    acetaminophen (TYLENOL) tablet 650 mg, 650 mg, Oral, Q6H PRN, Tish Humphries MD      Electronically signed by Bigg Patterson MD on 9/12/2020 at 6:44 PM

## 2020-09-13 LAB
DATE LAST DOSE: ABNORMAL
REPORTED DOSE,DOSE: ABNORMAL UNITS
VANCOMYCIN TROUGH SERPL-MCNC: 17.2 UG/ML (ref 5–10)

## 2020-09-13 PROCEDURE — 65270000029 HC RM PRIVATE

## 2020-09-13 PROCEDURE — 74011250636 HC RX REV CODE- 250/636: Performed by: FAMILY MEDICINE

## 2020-09-13 PROCEDURE — 74011636637 HC RX REV CODE- 636/637: Performed by: FAMILY MEDICINE

## 2020-09-13 PROCEDURE — 74011250637 HC RX REV CODE- 250/637: Performed by: FAMILY MEDICINE

## 2020-09-13 PROCEDURE — 36415 COLL VENOUS BLD VENIPUNCTURE: CPT

## 2020-09-13 PROCEDURE — 80202 ASSAY OF VANCOMYCIN: CPT

## 2020-09-13 PROCEDURE — 74011000258 HC RX REV CODE- 258: Performed by: FAMILY MEDICINE

## 2020-09-13 RX ADMIN — VANCOMYCIN HYDROCHLORIDE 1250 MG: 1.25 INJECTION, POWDER, LYOPHILIZED, FOR SOLUTION INTRAVENOUS at 18:40

## 2020-09-13 RX ADMIN — PREDNISONE 20 MG: 20 TABLET ORAL at 10:55

## 2020-09-13 RX ADMIN — CYANOCOBALAMIN TAB 1000 MCG 1000 MCG: 1000 TAB at 10:54

## 2020-09-13 RX ADMIN — THIAMINE HCL TAB 100 MG 50 MG: 100 TAB at 10:55

## 2020-09-13 RX ADMIN — VANCOMYCIN HYDROCHLORIDE 1250 MG: 1.25 INJECTION, POWDER, LYOPHILIZED, FOR SOLUTION INTRAVENOUS at 04:31

## 2020-09-13 RX ADMIN — ENOXAPARIN SODIUM 40 MG: 40 INJECTION SUBCUTANEOUS at 10:55

## 2020-09-13 RX ADMIN — PIPERACILLIN SODIUM AND TAZOBACTAM SODIUM 3.38 G: 3; .375 INJECTION, POWDER, LYOPHILIZED, FOR SOLUTION INTRAVENOUS at 04:30

## 2020-09-13 RX ADMIN — PANTOPRAZOLE SODIUM 40 MG: 40 TABLET, DELAYED RELEASE ORAL at 10:54

## 2020-09-13 RX ADMIN — PIPERACILLIN SODIUM AND TAZOBACTAM SODIUM 3.38 G: 3; .375 INJECTION, POWDER, LYOPHILIZED, FOR SOLUTION INTRAVENOUS at 18:40

## 2020-09-13 NOTE — PROGRESS NOTES
Progress Note  Date:2020       Room:[facility]  Patient Name:Ronnell Diaz     YOB: 1950     Age:69 y.o. Subjective    Subjective     Patient open eyes not following any commands still hypoxic on 4 L oxygen by nasal cannula COVID screening positive sputum culture positive for Klebsiella pneumonia Proteus mirabilis and MRSA on vancomycin and Zosyn  Review of Systems   Unable to perform ROS: Dementia     Objective         Vitals Last 24 Hours:  TEMPERATURE:  Temp  Av.9 °F (36.6 °C)  Min: 97.4 °F (36.3 °C)  Max: 98.1 °F (36.7 °C)  RESPIRATIONS RANGE: Resp  Av.5  Min: 18  Max: 20  PULSE OXIMETRY RANGE: SpO2  Av.8 %  Min: 90 %  Max: 100 %  PULSE RANGE: Pulse  Av.5  Min: 43  Max: 82  BLOOD PRESSURE RANGE: Systolic (74KHF), LFN:730 , Min:133 , SULY:260   ; Diastolic (31OOM), RVX:68, Min:74, Max:95      I/O (24Hr): Intake/Output Summary (Last 24 hours) at 2020 1620  Last data filed at 2020 0606  Gross per 24 hour   Intake 2430 ml   Output 600 ml   Net 1830 ml     Objective:  General Appearance:  Uncomfortable. Vital signs: (most recent): Blood pressure 133/84, pulse 76, temperature 98.1 °F (36.7 °C), resp. rate 20, height 5' 10\" (1.778 m), weight 86 kg (189 lb 9.5 oz), SpO2 90 %. Vital signs are normal.    HEENT: Normal HEENT exam.    Lungs: Tachypnea and increased effort. Breath sounds clear to auscultation. Heart: Normal rate. Regular rhythm. S1 normal and S2 normal.    Chest: Symmetric chest wall expansion. Abdomen: Abdomen is soft and flat. Bowel sounds are normal.   There is no abdominal tenderness. Extremities: Normal range of motion. Pulses: Distal pulses are intact. Neurological: Patient is alert and oriented to person, place and time. Pupils:  Pupils are equal, round, and reactive to light. Skin:  Warm.       Labs/Imaging/Diagnostics    Labs:  CBC:  Recent Labs     20  0806   WBC 4.7   RBC 4.04*   HGB 12.5   HCT 38.3   MCV 94.8 RDW 14.6*   PLT 97*     CHEMISTRIES:  Recent Labs     09/12/20  0806      K 3.3*      CO2 28   BUN 15   CA 8.5   PT/INR:No results for input(s): INR, INREXT, INREXT in the last 72 hours. No lab exists for component: PROTIME  APTT:No results for input(s): APTT in the last 72 hours. LIVER PROFILE:No results for input(s): AST, ALT in the last 72 hours. No lab exists for component: BILIDIR, BILITOT, ALKPHOS  No results found for: ALT, AST, GGT, GGTP, AP, APIT, APX, CBIL, TBIL, TBILI    Imaging Last 24 Hours:  No results found.   Assessment//Plan           Patient Active Problem List    Diagnosis Date Noted    SOB (shortness of breath) 09/12/2020     Assessment & Plan    Acute hypoxemic respiratory failure on 4 L  Pneumonia with Klebsiella pneumonia Proteus mirabilis and MRSA on IV vancomycin and Zosyn  COVID-19 pneumonitis  COVID-19 not due to HIV  Severe dehydration  HIV disease  Hepatitis B  History of neurosyphilis  History of transitional cell bladder cancer  Chronic pain syndrome  Thrombocytopenia  Neutropenia  Hyponatremia  No sepsis    Still on 4 L oxygen by nasal cannula unable to wean  Unable to wean  Continue current medications    Overall poor prognosis      Current Facility-Administered Medications:     cyanocobalamin tablet 1,000 mcg, 1,000 mcg, Oral, DAILY, Barry Villasenor MD, 1,000 mcg at 09/13/20 1054    emtricitabine-tenofovir (TDF) (TRUVADA) 200-300 mg per tablet 1 Tab, 1 Tab, Oral, DAILY, Barry Villasenor MD, 1 Tab at 09/12/20 0900    enoxaparin (LOVENOX) injection 40 mg, 40 mg, SubCUTAneous, Q24H, Barry Villasenor MD, 40 mg at 09/13/20 1055    pantoprazole (PROTONIX) tablet 40 mg, 40 mg, Oral, ACB, Barry Villasenor MD, 40 mg at 09/13/20 1054    piperacillin-tazobactam (ZOSYN) 3.375 g in 0.9% sodium chloride (MBP/ADV) 100 mL MBP, 3.375 g, IntraVENous, Q8H, Barry Villasenor MD, Last Rate: 25 mL/hr at 09/13/20 0430, 3.375 g at 09/13/20 0430    predniSONE (Troy Solano) tablet 20 mg, 20 mg, Per G Tube, DAILY, Barry Villasenor MD, 20 mg at 09/13/20 1055    raltegravir (ISENTRESS) tablet 400 mg, 400 mg, Oral, BID, Barry Villasenor MD, 400 mg at 09/12/20 1750    VANCOMYCIN INFORMATION NOTE, , Other, Rx Dosing/Monitoring, Barry Villasenor MD    thiamine mononitrate (B-1) tablet 50 mg, 50 mg, Oral, DAILY, Barry Villasenor MD, 50 mg at 09/13/20 1055    vancomycin (VANCOCIN) 1,250 mg in 0.9% sodium chloride 250 mL IVPB, 1,250 mg, IntraVENous, Q12H, Barry Villasenor MD, Last Rate: 250 mL/hr at 09/13/20 0431, 1,250 mg at 09/13/20 0431    ziprasidone (GEODON) capsule 40 mg, 40 mg, Per G Tube, BID WITH MEALS, Barry Villasenor MD, 40 mg at 09/12/20 1750    acetaminophen (TYLENOL) suppository 650 mg, 650 mg, Rectal, Q4H PRN, Barry Villasenor MD    acetaminophen (TYLENOL) tablet 650 mg, 650 mg, Oral, Q6H PRN, Quinten Alves MD      Electronically signed by Charlene Grimm MD on 9/13/2020 at 6:44 PM

## 2020-09-13 NOTE — PROGRESS NOTES
RENAL    Labs have corrected off IVF  No other renal reccs at this time  Will sign off, please recall if needed    Results for Yennifer Greenwood (MRN [id number]) as of 9/13/2020 12:37    9/12/2020 08:06  Sodium: 144  Potassium: 3.3 (L)  Chloride: 108  CO2: 28  Anion gap: 8  Glucose: 139 (H)  BUN: 15  Creatinine: 0.78  BUN/Creatinine ratio: 19  Calcium: 8.5  GFR est non-AA: >60

## 2020-09-13 NOTE — PROGRESS NOTES
Bedside and verbal shift change report given to KAELA Downey (oncoming nurse) by David Downs RN (offgoing nurse). Report included the following information SBAR, Kardex, Intake/Output and Quality Measures. Patient afebrile and vitals stable. Continued care for COVID. Zosyn and Vanco per MAR. Tube feed in place at 80/hr. No significant events this shift.

## 2020-09-13 NOTE — PROGRESS NOTES
Pharmacy Dosing Services: Vancomycin therapy      Consult provided for this 81y.o. year old male , for indication of HAP, COVID19+. Day of Therapy 7    Ht Readings from Last 1 Encounters:   09/11/20 177.8 cm (70\")        Wt Readings from Last 1 Encounters:   09/13/20 86 kg (189 lb 9.5 oz)        Previous Regimen 1250mg q12h   Last Level 11.7 on 09/11/2020 @ 1500   Other Current Antibiotics ZOSYN   Significant Cultures    Serum Creatinine Lab Results   Component Value Date/Time    Creatinine 0.78 09/12/2020 08:06 AM      Creatinine Clearance Estimated Creatinine Clearance: 94.9 mL/min (based on SCr of 0.78 mg/dL). BUN Lab Results   Component Value Date/Time    BUN 15 09/12/2020 08:06 AM      WBC Lab Results   Component Value Date/Time    WBC 4.7 09/12/2020 08:06 AM      H/H Lab Results   Component Value Date/Time    HGB 12.5 09/12/2020 08:06 AM      Platelets Lab Results   Component Value Date/Time    PLATELET 97 (L) 33/20/8668 08:06 AM      Temp 98.1 °F (36.7 °C)       Level due 09/13/2020 at 1500 modified to include information to use correct RED top tube for vancomycin blood level. Lab is to contact pharmacy if unable to drawn on time. Pharmacy to follow daily and will make changes to dose and/or frequency based on clinical status.       Isaías Flores PharmD BCPS

## 2020-09-13 NOTE — PROGRESS NOTES
Pulmonary Progress Note    Subjective:   Daily Progress Note: 2020 1:58 PM    CC: From here arm diabetes with fever aspiration    HPI: 62-year-old male came in with shortness of breath, Quintella Leavens he was COVID positive also is HIV positive      Review of Systems  Unable to obtain    Objective:     Visit Vitals  BP (!) 157/95   Pulse 82   Temp 98.1 °F (36.7 °C)   Resp 18   Ht 5' 10\" (1.778 m)   Wt 86 kg (189 lb 9.5 oz)   SpO2 100%   BMI 27.20 kg/m²           Temp (24hrs), Av.8 °F (36.6 °C), Min:97.4 °F (36.3 °C), Max:98.1 °F (36.7 °C)      No intake/output data recorded.  1901 -  0700  In: 2430 [I.V.:1150]  Out: 600 [Urine:600]    Visit Vitals  BP (!) 157/95   Pulse 82   Temp 98.1 °F (36.7 °C)   Resp 18   Ht 5' 10\" (1.778 m)   Wt 86 kg (189 lb 9.5 oz)   SpO2 100%   BMI 27.20 kg/m²     General:  Alert, cooperative, no distress, appears stated age. Head:  Normocephalic, without obvious abnormality, atraumatic. Eyes:  Conjunctivae/corneas clear. PERRL, EOMs intact. Fundi benign   Ears:  Normal TMs and external ear canals both ears. Nose: Nares normal. Septum midline. Mucosa normal. No drainage or sinus tenderness. Throat: Lips, mucosa, and tongue normal. Teeth and gums normal.   Neck: Supple, symmetrical, trachea midline, no adenopathy, thyroid: no enlargement/tenderness/nodules, no carotid bruit and no JVD. Back:   Symmetric, no curvature. ROM normal. No CVA tenderness. Lungs:   Clear to auscultation bilaterally. Chest wall:  No tenderness or deformity. Heart:  Regular rate and rhythm, S1, S2 normal, no murmur, click, rub or gallop. Abdomen:   Soft, non-tender. Bowel sounds normal. No masses,  No organomegaly. Genitalia:  Normal male without lesion, discharge or tenderness. Rectal:  Normal tone, normal prostate, no masses or tenderness  Guaiac negative stool. Extremities: Extremities normal, atraumatic, no cyanosis or edema. Pulses: 2+ and symmetric all extremities. Skin: Skin color, texture, turgor normal. No rashes or lesions   Lymph nodes: Cervical, supraclavicular, and axillary nodes normal.   Neurologic: CNII-XII intact. Normal strength, sensation and reflexes throughout.            Data Review    Recent Results (from the past 24 hour(s))   GLUCOSE, POC    Collection Time: 09/12/20  8:50 PM   Result Value Ref Range    Glucose (POC) 177 (H) 65 - 100 mg/dL    Performed by Yudi Atkinson        Current Facility-Administered Medications   Medication Dose Route Frequency    cyanocobalamin tablet 1,000 mcg  1,000 mcg Oral DAILY    emtricitabine-tenofovir (TDF) (TRUVADA) 200-300 mg per tablet 1 Tab  1 Tab Oral DAILY    enoxaparin (LOVENOX) injection 40 mg  40 mg SubCUTAneous Q24H    pantoprazole (PROTONIX) tablet 40 mg  40 mg Oral ACB    piperacillin-tazobactam (ZOSYN) 3.375 g in 0.9% sodium chloride (MBP/ADV) 100 mL MBP  3.375 g IntraVENous Q8H    predniSONE (DELTASONE) tablet 20 mg  20 mg Per G Tube DAILY    raltegravir (ISENTRESS) tablet 400 mg  400 mg Oral BID    VANCOMYCIN INFORMATION NOTE   Other Rx Dosing/Monitoring    thiamine mononitrate (B-1) tablet 50 mg  50 mg Oral DAILY    vancomycin (VANCOCIN) 1,250 mg in 0.9% sodium chloride 250 mL IVPB  1,250 mg IntraVENous Q12H    ziprasidone (GEODON) capsule 40 mg  40 mg Per G Tube BID WITH MEALS    acetaminophen (TYLENOL) suppository 650 mg  650 mg Rectal Q4H PRN    acetaminophen (TYLENOL) tablet 650 mg  650 mg Oral Q6H PRN         Assessment/Plan:     COVID-19 pneumonia  Acute  hypoxic respiratory failure  HIV positive  Continue to wean oxygen per protocol  Patient was treated with Zosyn Lovenox prednisone Zithromax and to Actemra

## 2020-09-14 LAB
GLUCOSE BLD STRIP.AUTO-MCNC: 135 MG/DL (ref 65–100)
GLUCOSE BLD STRIP.AUTO-MCNC: 144 MG/DL (ref 65–100)
GLUCOSE BLD STRIP.AUTO-MCNC: 221 MG/DL (ref 65–100)
PERFORMED BY, TECHID: ABNORMAL

## 2020-09-14 PROCEDURE — 74011636637 HC RX REV CODE- 636/637: Performed by: FAMILY MEDICINE

## 2020-09-14 PROCEDURE — 65270000029 HC RM PRIVATE

## 2020-09-14 PROCEDURE — 77010033678 HC OXYGEN DAILY

## 2020-09-14 PROCEDURE — 94760 N-INVAS EAR/PLS OXIMETRY 1: CPT

## 2020-09-14 PROCEDURE — 74011250637 HC RX REV CODE- 250/637: Performed by: FAMILY MEDICINE

## 2020-09-14 PROCEDURE — 82962 GLUCOSE BLOOD TEST: CPT

## 2020-09-14 PROCEDURE — 74011250636 HC RX REV CODE- 250/636: Performed by: FAMILY MEDICINE

## 2020-09-14 PROCEDURE — 74011000258 HC RX REV CODE- 258: Performed by: FAMILY MEDICINE

## 2020-09-14 RX ADMIN — ZIPRASIDONE HCL 40 MG: 40 CAPSULE ORAL at 18:06

## 2020-09-14 RX ADMIN — PIPERACILLIN SODIUM AND TAZOBACTAM SODIUM 3.38 G: 3; .375 INJECTION, POWDER, LYOPHILIZED, FOR SOLUTION INTRAVENOUS at 12:38

## 2020-09-14 RX ADMIN — SODIUM CHLORIDE 1000 MG: 9 INJECTION, SOLUTION INTRAVENOUS at 06:00

## 2020-09-14 RX ADMIN — THIAMINE HCL TAB 100 MG 50 MG: 100 TAB at 09:56

## 2020-09-14 RX ADMIN — SODIUM CHLORIDE 1000 MG: 9 INJECTION, SOLUTION INTRAVENOUS at 18:16

## 2020-09-14 RX ADMIN — ENOXAPARIN SODIUM 40 MG: 40 INJECTION SUBCUTANEOUS at 09:53

## 2020-09-14 RX ADMIN — PIPERACILLIN SODIUM AND TAZOBACTAM SODIUM 3.38 G: 3; .375 INJECTION, POWDER, LYOPHILIZED, FOR SOLUTION INTRAVENOUS at 20:30

## 2020-09-14 RX ADMIN — ZIPRASIDONE HCL 40 MG: 40 CAPSULE ORAL at 10:07

## 2020-09-14 RX ADMIN — PIPERACILLIN SODIUM AND TAZOBACTAM SODIUM 3.38 G: 3; .375 INJECTION, POWDER, LYOPHILIZED, FOR SOLUTION INTRAVENOUS at 03:11

## 2020-09-14 RX ADMIN — PANTOPRAZOLE SODIUM 40 MG: 40 TABLET, DELAYED RELEASE ORAL at 10:54

## 2020-09-14 RX ADMIN — RALTEGRAVIR 400 MG: 400 TABLET, FILM COATED ORAL at 18:00

## 2020-09-14 RX ADMIN — CYANOCOBALAMIN TAB 1000 MCG 1000 MCG: 1000 TAB at 09:52

## 2020-09-14 RX ADMIN — EMTRICITABINE AND TENOFOVIR DISOPROXIL FUMARATE 1 TABLET: 200; 300 TABLET, FILM COATED ORAL at 09:00

## 2020-09-14 RX ADMIN — PREDNISONE 20 MG: 20 TABLET ORAL at 09:55

## 2020-09-14 RX ADMIN — RALTEGRAVIR 400 MG: 400 TABLET, FILM COATED ORAL at 10:07

## 2020-09-14 NOTE — PROGRESS NOTES
Progress Note  Date:2020       Room:[facility] [place]  Patient Name:Ronnell Diaz     YOB: 1950     Age:69 y.o. Subjective    Subjective     Patient open eyes not following any commands still hypoxic on 4 L oxygen by nasal cannula COVID screening positive sputum culture positive for Klebsiella pneumonia Proteus mirabilis and MRSA on vancomycin and Zosyn  Review of Systems   Unable to perform ROS: Dementia     Objective         Vitals Last 24 Hours:  TEMPERATURE:  Temp  Av °F (36.7 °C)  Min: 97.6 °F (36.4 °C)  Max: 98.5 °F (36.9 °C)  RESPIRATIONS RANGE: Resp  Av.8  Min: 20  Max: 24  PULSE OXIMETRY RANGE: SpO2  Av.6 %  Min: 90 %  Max: 100 %  PULSE RANGE: Pulse  Av.4  Min: 74  Max: 91  BLOOD PRESSURE RANGE: Systolic (78SYC), SSE:640 , Min:133 , RBP:919   ; Diastolic (41YQA), VZM:18, Min:83, Max:91      I/O (24Hr): Intake/Output Summary (Last 24 hours) at 2020 1112  Last data filed at 2020 1848  Gross per 24 hour   Intake --   Output 1450 ml   Net -1450 ml     Objective:  General Appearance:  Uncomfortable. Vital signs: (most recent): Blood pressure (!) 160/83, pulse 84, temperature 98.5 °F (36.9 °C), resp. rate 24, height 5' 10\" (1.778 m), weight 86 kg (189 lb 9.5 oz), SpO2 94 %. Vital signs are normal.    HEENT: Normal HEENT exam.    Lungs: Tachypnea and increased effort. Breath sounds clear to auscultation. Heart: Normal rate. Regular rhythm. S1 normal and S2 normal.    Chest: Symmetric chest wall expansion. Abdomen: Abdomen is soft and flat. Bowel sounds are normal.   There is no abdominal tenderness. Extremities: Normal range of motion. Pulses: Distal pulses are intact. Neurological: Patient is alert and oriented to person, place and time. Pupils:  Pupils are equal, round, and reactive to light. Skin:  Warm.       Labs/Imaging/Diagnostics    Labs:  CBC:  Recent Labs     20  0806   WBC 4.7   RBC 4.04*   HGB 12.5   HCT 38.3   MCV 94.8 RDW 14.6*   PLT 97*     CHEMISTRIES:  Recent Labs     09/12/20  0806      K 3.3*      CO2 28   BUN 15   CA 8.5   PT/INR:No results for input(s): INR, INREXT, INREXT in the last 72 hours. No lab exists for component: PROTIME  APTT:No results for input(s): APTT in the last 72 hours. LIVER PROFILE:No results for input(s): AST, ALT in the last 72 hours. No lab exists for component: BILIDIR, BILITOT, ALKPHOS  No results found for: ALT, AST, GGT, GGTP, AP, APIT, APX, CBIL, TBIL, TBILI    Imaging Last 24 Hours:  No results found.   Assessment//Plan           Patient Active Problem List    Diagnosis Date Noted    SOB (shortness of breath) 09/12/2020     Assessment & Plan    Acute hypoxemic respiratory failure on 4 L  Pneumonia with Klebsiella pneumonia Proteus mirabilis and MRSA on IV vancomycin and Zosyn  COVID-19 pneumonitis  COVID-19 not due to HIV  Severe dehydration  HIV disease  Hepatitis B  History of neurosyphilis  History of transitional cell bladder cancer  Chronic pain syndrome  Thrombocytopenia  Neutropenia  Hyponatremia  No sepsis    Still on 4 L oxygen by nasal cannula unable to wean  Unable to wean  Continue current medications    Overall poor prognosis  PT OT consult  Repeat the labs      Current Facility-Administered Medications:     vancomycin (VANCOCIN) 1,000 mg in 0.9% sodium chloride 250 mL (VIAL-MATE), 1,000 mg, IntraVENous, Q12H, Barry Villasenor MD, 1,000 mg at 09/14/20 0600    [START ON 9/15/2020] Vancomycin trough due 09/15/2020 @ 1600 (red top tube), , Other, ONCE, Barry Villasenor MD    cyanocobalamin tablet 1,000 mcg, 1,000 mcg, Oral, DAILY, Barry Villasenor MD, 1,000 mcg at 09/14/20 0952    emtricitabine-tenofovir (TDF) (TRUVADA) 200-300 mg per tablet 1 Tab, 1 Tab, Oral, DAILY, Barry Villasenor MD, 1 Tab at 09/14/20 0900    enoxaparin (LOVENOX) injection 40 mg, 40 mg, SubCUTAneous, Q24H, Barry Villasenor MD, 40 mg at 09/14/20 0953    pantoprazole (PROTONIX) tablet 40 mg, 40 mg, Oral, ACB, Barry Villasenor MD, 40 mg at 09/14/20 1054    piperacillin-tazobactam (ZOSYN) 3.375 g in 0.9% sodium chloride (MBP/ADV) 100 mL MBP, 3.375 g, IntraVENous, Q8H, Barry Villasenor MD, Last Rate: 25 mL/hr at 09/14/20 0311, 3.375 g at 09/14/20 8679    predniSONE (DELTASONE) tablet 20 mg, 20 mg, Per G Tube, DAILY, Barry Villasenor MD, 20 mg at 09/14/20 0955    raltegravir (ISENTRESS) tablet 400 mg, 400 mg, Oral, BID, Barry Villasenor MD, 400 mg at 09/14/20 1007    VANCOMYCIN INFORMATION NOTE, , Other, Rx Dosing/Monitoring, Barry Villasenor MD    thiamine mononitrate (B-1) tablet 50 mg, 50 mg, Oral, DAILY, Barry Villasenor MD, 50 mg at 09/14/20 0956    ziprasidone (GEODON) capsule 40 mg, 40 mg, Per G Tube, BID WITH MEALS, Barry Villasenor MD, 40 mg at 09/14/20 1007    acetaminophen (TYLENOL) suppository 650 mg, 650 mg, Rectal, Q4H PRN, Barry Villasenor MD    acetaminophen (TYLENOL) tablet 650 mg, 650 mg, Oral, Q6H PRN, Leana Edmondson MD      Electronically signed by Elliott Almaraz MD on 9/14/2020 at 6:44 PM

## 2020-09-14 NOTE — ROUTINE PROCESS
Bedside and Verbal shift change report given to [facility] RN (oncoming nurse) by Henry Clark RN (offgoing nurse). Report included the following information SBAR, MAR and Recent Results.

## 2020-09-14 NOTE — PROGRESS NOTES
Pharmacy Dosing Services: Vancomycin Therapy    Consult provided for this 81y.o. year old male    Ht Readings from Last 1 Encounters:   09/11/20 177.8 cm (70\")        Wt Readings from Last 1 Encounters:   09/13/20 86 kg (189 lb 9.5 oz)        Previous Regimen 1250mg q12h   Last Level 17.2 on 09/13/2020 @ 1701   Other Current Antibiotics Zosyn   Significant Cultures    Serum Creatinine Lab Results   Component Value Date/Time    Creatinine 0.78 09/12/2020 08:06 AM      Creatinine Clearance Estimated Creatinine Clearance: 94.9 mL/min (based on SCr of 0.78 mg/dL). BUN Lab Results   Component Value Date/Time    BUN 15 09/12/2020 08:06 AM      WBC Lab Results   Component Value Date/Time    WBC 4.7 09/12/2020 08:06 AM      H/H Lab Results   Component Value Date/Time    HGB 12.5 09/12/2020 08:06 AM      Platelets Lab Results   Component Value Date/Time    PLATELET 97 (L) 19/98/4579 08:06 AM      Temp 98.1 °F (36.7 °C)       Change maintenance dose to 1000mg q12hr to begin 09/14/2020 @ 0600    Dose calculated to approximate a therapeutic trough of 14 mcg/mL. Pharmacy to follow daily and will make changes to dose and/or frequency based on clinical status.       Pharmacist - Dominique Luke PharmD BCPS

## 2020-09-14 NOTE — PROGRESS NOTES
Pulmonary Progress Note    Subjective:   Daily Progress Note: 2020 1:58 PM    CC: From here arm diabetes with fever aspiration    HPI: 53-year-old male came in with shortness of breath, Aracelis Sanchez he was COVID positive also is HIV positive    Patient in distress on oxygen nasal cannula mouth open but does not communicate  Review of Systems  Unable to obtain    Objective:     Visit Vitals  BP (!) 160/83   Pulse 84   Temp 98.5 °F (36.9 °C)   Resp 24   Ht 5' 10\" (1.778 m)   Wt 86 kg (189 lb 9.5 oz)   SpO2 94%   BMI 27.20 kg/m²    O2 Flow Rate (L/min): 5 l/min O2 Device: Nasal cannula    Temp (24hrs), Av °F (36.7 °C), Min:97.6 °F (36.4 °C), Max:98.5 °F (36.9 °C)      No intake/output data recorded.  1901 -  0700  In: 2430 [I.V.:1150]  Out:  [Urine:]    Visit Vitals  BP (!) 160/83   Pulse 84   Temp 98.5 °F (36.9 °C)   Resp 24   Ht 5' 10\" (1.778 m)   Wt 86 kg (189 lb 9.5 oz)   SpO2 94%   BMI 27.20 kg/m²     General:  Alert, cooperative, no distress, appears stated age. Head:  Normocephalic, without obvious abnormality, atraumatic. Eyes:  Conjunctivae/corneas clear. PERRL, EOMs intact. Fundi benign   Ears:  Normal TMs and external ear canals both ears. Nose: Nares normal. Septum midline. Mucosa normal. No drainage or sinus tenderness. Throat: Lips, mucosa, and tongue normal. Teeth and gums normal.   Neck: Supple, symmetrical, trachea midline, no adenopathy, thyroid: no enlargement/tenderness/nodules, no carotid bruit and no JVD. Back:   Symmetric, no curvature. ROM normal. No CVA tenderness. Lungs:   Clear to auscultation bilaterally. Chest wall:  No tenderness or deformity. Heart:  Regular rate and rhythm, S1, S2 normal, no murmur, click, rub or gallop. Abdomen:   Soft, non-tender. Bowel sounds normal. No masses,  No organomegaly. Genitalia:  Normal male without lesion, discharge or tenderness.    Rectal:  Normal tone, normal prostate, no masses or tenderness  Guaiac negative stool. Extremities: Extremities normal, atraumatic, no cyanosis or edema. Pulses: 2+ and symmetric all extremities.    Skin: Skin color, texture, turgor normal. No rashes or lesions   Lymph nodes: Cervical, supraclavicular, and axillary nodes normal.               Data Review    Recent Results (from the past 24 hour(s))   VANCOMYCIN, TROUGH    Collection Time: 09/13/20  5:01 PM   Result Value Ref Range    Vancomycin,trough 17.2 (H) 5.0 - 10.0 ug/mL    Reported dose date Not provided      Reported dose: Not provided Units   GLUCOSE, POC    Collection Time: 09/14/20  8:53 AM   Result Value Ref Range    Glucose (POC) 144 (H) 65 - 100 mg/dL    Performed by [facility] Facility-Administered Medications   Medication Dose Route Frequency    vancomycin (VANCOCIN) 1,000 mg in 0.9% sodium chloride 250 mL (VIAL-MATE)  1,000 mg IntraVENous Q12H    [START ON 9/15/2020] Vancomycin trough due 09/15/2020 @ 1600 (red top tube)   Other ONCE    cyanocobalamin tablet 1,000 mcg  1,000 mcg Oral DAILY    emtricitabine-tenofovir (TDF) (TRUVADA) 200-300 mg per tablet 1 Tab  1 Tab Oral DAILY    enoxaparin (LOVENOX) injection 40 mg  40 mg SubCUTAneous Q24H    pantoprazole (PROTONIX) tablet 40 mg  40 mg Oral ACB    piperacillin-tazobactam (ZOSYN) 3.375 g in 0.9% sodium chloride (MBP/ADV) 100 mL MBP  3.375 g IntraVENous Q8H    predniSONE (DELTASONE) tablet 20 mg  20 mg Per G Tube DAILY    raltegravir (ISENTRESS) tablet 400 mg  400 mg Oral BID    VANCOMYCIN INFORMATION NOTE   Other Rx Dosing/Monitoring    thiamine mononitrate (B-1) tablet 50 mg  50 mg Oral DAILY    ziprasidone (GEODON) capsule 40 mg  40 mg Per G Tube BID WITH MEALS    acetaminophen (TYLENOL) suppository 650 mg  650 mg Rectal Q4H PRN    acetaminophen (TYLENOL) tablet 650 mg  650 mg Oral Q6H PRN         Assessment/Plan:     COVID-19 pneumonia  Acute  hypoxic respiratory failure  HIV positive  History of transitional cell bladder cancer  Klebsiella pneumonia  Continue to wean oxygen per protocol he is right now on 4 L nasal cannula  Patient was treated with Zosyn Lovenox prednisone Zithromax and to Actemra

## 2020-09-15 LAB
ALBUMIN SERPL-MCNC: 3.1 G/DL (ref 3.5–5)
ALBUMIN/GLOB SERPL: 0.8 {RATIO} (ref 1.1–2.2)
ALP SERPL-CCNC: 85 U/L (ref 45–117)
ALT SERPL-CCNC: 32 U/L (ref 12–78)
ANION GAP SERPL CALC-SCNC: 9 MMOL/L (ref 5–15)
AST SERPL W P-5'-P-CCNC: 24 U/L (ref 15–37)
BASOPHILS # BLD: 0 K/UL (ref 0–0.1)
BASOPHILS NFR BLD: 0 % (ref 0–1)
BILIRUB SERPL-MCNC: 0.5 MG/DL (ref 0.2–1)
BUN SERPL-MCNC: 13 MG/DL (ref 6–20)
BUN/CREAT SERPL: 16 (ref 12–20)
CA-I BLD-MCNC: 8.6 MG/DL (ref 8.5–10.1)
CHLORIDE SERPL-SCNC: 109 MMOL/L (ref 97–108)
CO2 SERPL-SCNC: 27 MMOL/L (ref 21–32)
CREAT SERPL-MCNC: 0.81 MG/DL (ref 0.7–1.3)
DIFFERENTIAL METHOD BLD: ABNORMAL
EOSINOPHIL # BLD: 0.1 K/UL (ref 0–0.4)
EOSINOPHIL NFR BLD: 2 % (ref 0–7)
ERYTHROCYTE [DISTWIDTH] IN BLOOD BY AUTOMATED COUNT: 15.1 % (ref 11.5–14.5)
GLOBULIN SER CALC-MCNC: 3.8 G/DL (ref 2–4)
GLUCOSE SERPL-MCNC: 127 MG/DL (ref 65–100)
HCT VFR BLD AUTO: 39.5 % (ref 36.6–50.3)
HGB BLD-MCNC: 13.1 % (ref 12.1–17)
IMM GRANULOCYTES # BLD AUTO: 0 K/UL (ref 0–0.04)
IMM GRANULOCYTES NFR BLD AUTO: 0 % (ref 0–0.5)
LYMPHOCYTES # BLD: 1.5 K/UL (ref 0.8–3.5)
LYMPHOCYTES NFR BLD: 29 % (ref 12–49)
MCH RBC QN AUTO: 31 PG (ref 26–34)
MCHC RBC AUTO-ENTMCNC: 33.2 G/DL (ref 30–36.5)
MCV RBC AUTO: 93.4 FL (ref 80–99)
MONOCYTES # BLD: 0.4 K/UL (ref 0–1)
MONOCYTES NFR BLD: 8 % (ref 5–13)
NEUTS SEG # BLD: 3.1 K/UL (ref 1.8–8)
NEUTS SEG NFR BLD: 61 % (ref 32–75)
PLATELET # BLD AUTO: 96 K/UL (ref 150–400)
PMV BLD AUTO: 11.7 FL (ref 8.9–12.9)
POTASSIUM SERPL-SCNC: 3.5 MMOL/L (ref 3.5–5.1)
PROT SERPL-MCNC: 6.9 G/DL (ref 6.4–8.2)
RBC # BLD AUTO: 4.23 M/UL (ref 4.1–5.7)
SODIUM SERPL-SCNC: 145 MMOL/L (ref 136–145)
VANCOMYCIN TROUGH SERPL-MCNC: 9.7 UG/ML (ref 5–10)
WBC # BLD AUTO: 5.1 K/UL (ref 4.1–11.1)

## 2020-09-15 PROCEDURE — 74011250637 HC RX REV CODE- 250/637: Performed by: FAMILY MEDICINE

## 2020-09-15 PROCEDURE — 36415 COLL VENOUS BLD VENIPUNCTURE: CPT

## 2020-09-15 PROCEDURE — 80202 ASSAY OF VANCOMYCIN: CPT

## 2020-09-15 PROCEDURE — 74011000258 HC RX REV CODE- 258: Performed by: FAMILY MEDICINE

## 2020-09-15 PROCEDURE — 80053 COMPREHEN METABOLIC PANEL: CPT

## 2020-09-15 PROCEDURE — 74011636637 HC RX REV CODE- 636/637: Performed by: FAMILY MEDICINE

## 2020-09-15 PROCEDURE — 85025 COMPLETE CBC W/AUTO DIFF WBC: CPT

## 2020-09-15 PROCEDURE — 65270000029 HC RM PRIVATE

## 2020-09-15 PROCEDURE — 74011250636 HC RX REV CODE- 250/636: Performed by: FAMILY MEDICINE

## 2020-09-15 PROCEDURE — 74011250636 HC RX REV CODE- 250/636

## 2020-09-15 RX ORDER — PIPERACILLIN SODIUM, TAZOBACTAM SODIUM 3; .375 G/15ML; G/15ML
INJECTION, POWDER, LYOPHILIZED, FOR SOLUTION INTRAVENOUS
Status: COMPLETED
Start: 2020-09-15 | End: 2020-09-15

## 2020-09-15 RX ORDER — SODIUM CHLORIDE 900 MG/100ML
INJECTION INTRAVENOUS
Status: DISPENSED
Start: 2020-09-15 | End: 2020-09-16

## 2020-09-15 RX ORDER — SODIUM CHLORIDE 9 MG/ML
INJECTION, SOLUTION INTRAVENOUS
Status: DISPENSED
Start: 2020-09-15 | End: 2020-09-16

## 2020-09-15 RX ADMIN — PREDNISONE 20 MG: 20 TABLET ORAL at 09:00

## 2020-09-15 RX ADMIN — PIPERACILLIN SODIUM AND TAZOBACTAM SODIUM 3.38 G: 3; .375 INJECTION, POWDER, LYOPHILIZED, FOR SOLUTION INTRAVENOUS at 20:09

## 2020-09-15 RX ADMIN — PIPERACILLIN SODIUM AND TAZOBACTAM SODIUM 3.38 G: 3; .375 INJECTION, POWDER, LYOPHILIZED, FOR SOLUTION INTRAVENOUS at 12:00

## 2020-09-15 RX ADMIN — PIPERACILLIN AND TAZOBACTAM 3.38 G: 3; .375 INJECTION, POWDER, LYOPHILIZED, FOR SOLUTION INTRAVENOUS at 18:37

## 2020-09-15 RX ADMIN — THIAMINE HCL TAB 100 MG 50 MG: 100 TAB at 12:12

## 2020-09-15 RX ADMIN — PIPERACILLIN SODIUM AND TAZOBACTAM SODIUM 3.38 G: 3; .375 INJECTION, POWDER, LYOPHILIZED, FOR SOLUTION INTRAVENOUS at 05:33

## 2020-09-15 RX ADMIN — EMTRICITABINE AND TENOFOVIR DISOPROXIL FUMARATE 1 TABLET: 200; 300 TABLET, FILM COATED ORAL at 09:00

## 2020-09-15 RX ADMIN — CYANOCOBALAMIN TAB 1000 MCG 1000 MCG: 1000 TAB at 12:12

## 2020-09-15 RX ADMIN — ENOXAPARIN SODIUM 40 MG: 40 INJECTION SUBCUTANEOUS at 12:11

## 2020-09-15 RX ADMIN — RALTEGRAVIR 400 MG: 400 TABLET, FILM COATED ORAL at 09:00

## 2020-09-15 RX ADMIN — PANTOPRAZOLE SODIUM 40 MG: 40 TABLET, DELAYED RELEASE ORAL at 12:11

## 2020-09-15 NOTE — PROGRESS NOTES
Encounter Date: 10/20/2019       History     Chief Complaint   Patient presents with    Nausea     PT REPORTS HAD N/V/D THE LAST 2 DAYS AND TODAY NO VOMITING OR DIARRHEA BUT NAUSEA CONTINUES. DENIES ANY ABD PAIN     HPI  Review of patient's allergies indicates:  No Known Allergies  History reviewed. No pertinent past medical history.  Past Surgical History:   Procedure Laterality Date    tubaligation       No family history on file.  Social History     Tobacco Use    Smoking status: Current Every Day Smoker     Packs/day: 0.50    Smokeless tobacco: Never Used    Tobacco comment: 2-3 cigs daily   Substance Use Topics    Alcohol use: No     Frequency: Never    Drug use: No     Review of Systems    Physical Exam     Initial Vitals [10/20/19 1921]   BP Pulse Resp Temp SpO2   123/84 71 20 98.4 °F (36.9 °C) 99 %      MAP       --         Physical Exam    ED Course   Procedures  Labs Reviewed   POCT URINALYSIS W/O SCOPE - Abnormal; Notable for the following components:       Result Value    Bilirubin, UA 2+ (*)     Ketones, UA 1+ (*)     Protein, UA 2+ (*)     All other components within normal limits   POCT URINE PREGNANCY   POCT URINALYSIS W/O SCOPE          Imaging Results    None                               Clinical Impression:       ICD-10-CM ICD-9-CM   1. Nausea R11.0 787.02         Disposition:   Disposition: Eloped  Condition: Stable                        Pedro Desai MD  10/20/19 2043     General Daily Progress Note          Patient Name:   Ada Alvarenga       YOB: 1950       Age:  81 y.o. Admit Date: 8/29/2020      Subjective:     Sitting in the bed nonverbal eyes open             Objective:     Visit Vitals  /85 (BP Patient Position: At rest)   Pulse 92   Temp 98.3 °F (36.8 °C)   Resp 20   Ht 5' 10\" (1.778 m)   Wt 87.8 kg (193 lb 9 oz)   SpO2 95%   BMI 27.77 kg/m²                Review of Systems  ROS   Unable to obtain        Physical Exam:      Physical Exam   Constitutional: He appears well-developed. HENT:   Head: Normocephalic and atraumatic. Eyes: Pupils are equal, round, and reactive to light. EOM are normal.   Cardiovascular: Normal rate, regular rhythm and normal heart sounds. Pulmonary/Chest: Breath sounds normal. He has no wheezes. He has no rales. He exhibits no tenderness. Abdominal: Soft. Bowel sounds are normal. There is no abdominal tenderness. There is no rebound and no guarding. Musculoskeletal: Normal range of motion. Neurological: He is alert. Nonverbal    No orders to display        Recent Results (from the past 24 hour(s))   CBC WITH AUTOMATED DIFF    Collection Time: 09/15/20  3:46 PM   Result Value Ref Range    WBC 5.1 4.1 - 11.1 K/uL    RBC 4.23 4. 10 - 5.70 M/uL    HGB 13.1 12.1 - 17.0 %    HCT 39.5 36.6 - 50.3 %    MCV 93.4 80.0 - 99.0 FL    MCH 31.0 26.0 - 34.0 PG    MCHC 33.2 30.0 - 36.5 g/dL    RDW 15.1 (H) 11.5 - 14.5 %    PLATELET 96 (L) 017 - 400 K/uL    MPV 11.7 8.9 - 12.9 FL    NEUTROPHILS 61 32 - 75 %    LYMPHOCYTES 29 12 - 49 %    MONOCYTES 8 5 - 13 %    EOSINOPHILS 2 0 - 7 %    BASOPHILS 0 0 - 1 %    IMMATURE GRANULOCYTES 0 0.0 - 0.5 %    ABS. NEUTROPHILS 3.1 1.8 - 8.0 K/UL    ABS. LYMPHOCYTES 1.5 0.8 - 3.5 K/UL    ABS. MONOCYTES 0.4 0.0 - 1.0 K/UL    ABS. EOSINOPHILS 0.1 0.0 - 0.4 K/UL    ABS. BASOPHILS 0.0 0.0 - 0.1 K/UL    ABS. IMM.  GRANS. 0.0 0.00 - 0.04 K/UL    DF AUTOMATED           Assessment:     Acute hypoxemic respiratory failure on 4 L  Pneumonia with Klebsiella pneumonia Proteus mirabilis and MRSA on IV vancomycin and Zosyn  COVID-19 pneumonitis  COVID-19 not due to HIV  Severe dehydration  HIV disease  Hepatitis B  History of neurosyphilis  History of transitional cell bladder cancer  Chronic pain syndrome  Thrombocytopenia  Neutropenia  Hyponatremia  No sepsis     Still on 4 L oxygen by nasal cannula unable to wean  Unable to wean  Continue current medications     Overall poor prognosis  PT OT consult  Repeat the labs      Plan:       Continue current medications  Overall prognosis very poor  Still on 4 L oxygen by nasal cannula unable to wean  Unable to wean  Continue current medications     Overall poor prognosis  PT OT consult  Repeat the labs    Current Facility-Administered Medications:     0.9% sodium chloride infusion, , , ,     0.9% sodium chloride (MBP/ADV) infusion, , , ,     vancomycin (VANCOCIN) 1,000 mg in 0.9% sodium chloride 250 mL (VIAL-MATE), 1,000 mg, IntraVENous, Q12H, Barry Villasenor MD, 1,000 mg at 09/14/20 1816    Vancomycin trough due 09/15/2020 @ 1600 (red top tube), , Other, ONCE, Barry Villasenor MD    cyanocobalamin tablet 1,000 mcg, 1,000 mcg, Oral, DAILY, Barry Villasenor MD, 1,000 mcg at 09/15/20 1212    emtricitabine-tenofovir (TDF) (TRUVADA) 200-300 mg per tablet 1 Tab, 1 Tab, Oral, DAILY, Barry Villasenor MD, 1 Tab at 09/15/20 0900    enoxaparin (LOVENOX) injection 40 mg, 40 mg, SubCUTAneous, Q24H, Barry Villasenor MD, 40 mg at 09/15/20 1211    pantoprazole (PROTONIX) tablet 40 mg, 40 mg, Oral, ACB, Barry Villasenor MD, 40 mg at 09/15/20 1211    piperacillin-tazobactam (ZOSYN) 3.375 g in 0.9% sodium chloride (MBP/ADV) 100 mL MBP, 3.375 g, IntraVENous, Q8H, Barry Villasenor MD, Last Rate: 25 mL/hr at 09/15/20 1200, 3.375 g at 09/15/20 1200    predniSONE (DELTASONE) tablet 20 mg, 20 mg, Per G Tube, DAILY, Barry Villasenor MD, 20 mg at 09/15/20 0900    raltegravir (ISENTRESS) tablet 400 mg, 400 mg, Oral, BID, Barry Villasenor MD, 400 mg at 09/15/20 0900    VANCOMYCIN INFORMATION NOTE, , Other, Rx Dosing/Monitoring, Barry Villasenor MD    thiamine mononitrate (B-1) tablet 50 mg, 50 mg, Oral, DAILY, Barry Villasenor MD, 50 mg at 09/15/20 1212    ziprasidone (GEODON) capsule 40 mg, 40 mg, Per G Tube, BID WITH MEALS, Barry Villasenor MD, 40 mg at 09/14/20 1806    acetaminophen (TYLENOL) suppository 650 mg, 650 mg, Rectal, Q4H PRN, Barry Villasenor MD    acetaminophen (TYLENOL) tablet 650 mg, 650 mg, Oral, Q6H PRN, Selene Cantrell MD

## 2020-09-15 NOTE — PROGRESS NOTES
Pulmonary Progress Note    Subjective:   Daily Progress Note: 9/15/2020 1:58 PM    CC: From here arm diabetes with fever aspiration    HPI: 59-year-old male came in with shortness of breath, [place] he was COVID positive also is HIV positive    Patient in distress on oxygen nasal cannula mouth open but does not communicate  He is removing his oxygen nasal cannula  Review of Systems  Unable to obtain    Objective:     Visit Vitals  /78 (BP 1 Location: Left arm, BP Patient Position: At rest)   Pulse 77   Temp 97.9 °F (36.6 °C)   Resp 18   Ht 5' 10\" (1.778 m)   Wt 87.8 kg (193 lb 9 oz)   SpO2 94%   BMI 27.77 kg/m²    O2 Flow Rate (L/min): 4 l/min O2 Device: Room air    Temp (24hrs), Av.3 °F (36.8 °C), Min:97.9 °F (36.6 °C), Max:98.6 °F (37 °C)      No intake/output data recorded.  1901 - 09/15 0700  In: -   Out: 2 [Urine:1]    Visit Vitals  /78 (BP 1 Location: Left arm, BP Patient Position: At rest)   Pulse 77   Temp 97.9 °F (36.6 °C)   Resp 18   Ht 5' 10\" (1.778 m)   Wt 87.8 kg (193 lb 9 oz)   SpO2 94%   BMI 27.77 kg/m²     General:  Alert, cooperative, no distress, appears stated age. Head:  Normocephalic, without obvious abnormality, atraumatic. Eyes:  Conjunctivae/corneas clear. PERRL, EOMs intact. Fundi benign   Ears:  Normal TMs and external ear canals both ears. Nose: Nares normal. Septum midline. Mucosa normal. No drainage or sinus tenderness. Throat: Lips, mucosa, and tongue normal. Teeth and gums normal.   Neck: Supple, symmetrical, trachea midline, no adenopathy, thyroid: no enlargement/tenderness/nodules, no carotid bruit and no JVD. Back:   Symmetric, no curvature. ROM normal. No CVA tenderness. Lungs:   Clear to auscultation bilaterally. Chest wall:  No tenderness or deformity. Heart:  Regular rate and rhythm, S1, S2 normal, no murmur, click, rub or gallop. Abdomen:   Soft, non-tender. Bowel sounds normal. No masses,  No organomegaly.    Genitalia:  Normal male without lesion, discharge or tenderness. Rectal:  Normal tone, normal prostate, no masses or tenderness  Guaiac negative stool. Extremities: Extremities normal, atraumatic, no cyanosis or edema. Pulses: 2+ and symmetric all extremities.    Skin: Skin color, texture, turgor normal. No rashes or lesions   Lymph nodes: Cervical, supraclavicular, and axillary nodes normal.               Data Review    Recent Results (from the past 24 hour(s))   GLUCOSE, POC    Collection Time: 09/14/20 12:15 PM   Result Value Ref Range    Glucose (POC) 135 (H) 65 - 100 mg/dL    Performed by [address] [place] [address], POC    Collection Time: 09/14/20  3:52 PM   Result Value Ref Range    Glucose (POC) 221 (H) 65 - 100 mg/dL    Performed by [facility]        Current Facility-Administered Medications   Medication Dose Route Frequency    vancomycin (VANCOCIN) 1,000 mg in 0.9% sodium chloride 250 mL (VIAL-MATE)  1,000 mg IntraVENous Q12H    Vancomycin trough due 09/15/2020 @ 1600 (red top tube)   Other ONCE    cyanocobalamin tablet 1,000 mcg  1,000 mcg Oral DAILY    emtricitabine-tenofovir (TDF) (TRUVADA) 200-300 mg per tablet 1 Tab  1 Tab Oral DAILY    enoxaparin (LOVENOX) injection 40 mg  40 mg SubCUTAneous Q24H    pantoprazole (PROTONIX) tablet 40 mg  40 mg Oral ACB    piperacillin-tazobactam (ZOSYN) 3.375 g in 0.9% sodium chloride (MBP/ADV) 100 mL MBP  3.375 g IntraVENous Q8H    predniSONE (DELTASONE) tablet 20 mg  20 mg Per G Tube DAILY    raltegravir (ISENTRESS) tablet 400 mg  400 mg Oral BID    VANCOMYCIN INFORMATION NOTE   Other Rx Dosing/Monitoring    thiamine mononitrate (B-1) tablet 50 mg  50 mg Oral DAILY    ziprasidone (GEODON) capsule 40 mg  40 mg Per G Tube BID WITH MEALS    acetaminophen (TYLENOL) suppository 650 mg  650 mg Rectal Q4H PRN    acetaminophen (TYLENOL) tablet 650 mg  650 mg Oral Q6H PRN         Assessment/Plan:     COVID-19 pneumonia  Acute  hypoxic respiratory failure  HIV positive  History of transitional cell bladder cancer  Klebsiella pneumonia  Continue to wean oxygen per protocol he is right now on 4 L nasal cannula but he is removing his oxygen nasal cannula  Patient was treated with Zosyn Lovenox prednisone Zithromax and to Actemra

## 2020-09-16 PROCEDURE — 77010033678 HC OXYGEN DAILY

## 2020-09-16 PROCEDURE — 74011250637 HC RX REV CODE- 250/637: Performed by: FAMILY MEDICINE

## 2020-09-16 PROCEDURE — 65270000029 HC RM PRIVATE

## 2020-09-16 PROCEDURE — 74011636637 HC RX REV CODE- 636/637: Performed by: FAMILY MEDICINE

## 2020-09-16 PROCEDURE — 74011250636 HC RX REV CODE- 250/636: Performed by: FAMILY MEDICINE

## 2020-09-16 PROCEDURE — 74011000258 HC RX REV CODE- 258: Performed by: FAMILY MEDICINE

## 2020-09-16 PROCEDURE — 74011250637 HC RX REV CODE- 250/637

## 2020-09-16 RX ORDER — ASPIRIN 325 MG/1
TABLET, FILM COATED ORAL
Status: COMPLETED
Start: 2020-09-16 | End: 2020-09-16

## 2020-09-16 RX ADMIN — RALTEGRAVIR 400 MG: 400 TABLET, FILM COATED ORAL at 17:52

## 2020-09-16 RX ADMIN — PIPERACILLIN SODIUM AND TAZOBACTAM SODIUM 3.38 G: 3; .375 INJECTION, POWDER, LYOPHILIZED, FOR SOLUTION INTRAVENOUS at 04:08

## 2020-09-16 RX ADMIN — THIAMINE HCL TAB 100 MG 100 MG: 100 TAB at 10:50

## 2020-09-16 RX ADMIN — SODIUM CHLORIDE 1000 MG: 9 INJECTION, SOLUTION INTRAVENOUS at 17:53

## 2020-09-16 RX ADMIN — ZIPRASIDONE HCL 40 MG: 40 CAPSULE ORAL at 17:51

## 2020-09-16 RX ADMIN — PIPERACILLIN SODIUM AND TAZOBACTAM SODIUM 3.38 G: 3; .375 INJECTION, POWDER, LYOPHILIZED, FOR SOLUTION INTRAVENOUS at 10:30

## 2020-09-16 RX ADMIN — PIPERACILLIN SODIUM AND TAZOBACTAM SODIUM 3.38 G: 3; .375 INJECTION, POWDER, LYOPHILIZED, FOR SOLUTION INTRAVENOUS at 14:36

## 2020-09-16 RX ADMIN — ZIPRASIDONE HCL 40 MG: 40 CAPSULE ORAL at 10:49

## 2020-09-16 RX ADMIN — EMTRICITABINE AND TENOFOVIR DISOPROXIL FUMARATE 1 TABLET: 200; 300 TABLET, FILM COATED ORAL at 09:00

## 2020-09-16 RX ADMIN — PREDNISONE 20 MG: 20 TABLET ORAL at 10:48

## 2020-09-16 RX ADMIN — PANTOPRAZOLE SODIUM 40 MG: 40 TABLET, DELAYED RELEASE ORAL at 10:49

## 2020-09-16 RX ADMIN — THIAMINE HCL TAB 100 MG 50 MG: 100 TAB at 10:49

## 2020-09-16 RX ADMIN — RALTEGRAVIR 400 MG: 400 TABLET, FILM COATED ORAL at 10:48

## 2020-09-16 RX ADMIN — SODIUM CHLORIDE 1000 MG: 9 INJECTION, SOLUTION INTRAVENOUS at 02:38

## 2020-09-16 RX ADMIN — ENOXAPARIN SODIUM 40 MG: 40 INJECTION SUBCUTANEOUS at 14:36

## 2020-09-16 RX ADMIN — CYANOCOBALAMIN TAB 1000 MCG 1000 MCG: 1000 TAB at 10:49

## 2020-09-16 NOTE — PROGRESS NOTES
0730 - Bedside and Verbal shift change report given to myself (oncoming nurse) by Reid Granado (offgoing nurse). Report included the following information Kardex and Recent Results. 1200 - reported to MD PEG tube assessment. PEG site without abnormal drainage, skin intact    1600  Cleaned changed linen repositioned. 1800  meds given via PEG.   No complaints

## 2020-09-16 NOTE — PROGRESS NOTES
General Daily Progress Note          Patient Name:   Corin Fulton       YOB: 1950       Age:  81 y.o. Admit Date: 8/29/2020      Subjective:     Sitting in the bed nonverbal eyes open             Objective:     Visit Vitals  BP (!) 149/91   Pulse 85   Temp 97.5 °F (36.4 °C)   Resp 18   Ht 5' 10\" (1.778 m)   Wt 86.8 kg (191 lb 5.8 oz)   SpO2 94%   BMI 27.46 kg/m²                Review of Systems  ROS   Unable to obtain        Physical Exam:      Physical Exam   Constitutional: He appears well-developed. HENT:   Head: Normocephalic and atraumatic. Eyes: Pupils are equal, round, and reactive to light. EOM are normal.   Cardiovascular: Normal rate, regular rhythm and normal heart sounds. Pulmonary/Chest: Breath sounds normal. He has no wheezes. He has no rales. He exhibits no tenderness. Abdominal: Soft. Bowel sounds are normal. There is no abdominal tenderness. There is no rebound and no guarding. Musculoskeletal: Normal range of motion. Neurological: He is alert. Nonverbal    No orders to display        Recent Results (from the past 24 hour(s))   CBC WITH AUTOMATED DIFF    Collection Time: 09/15/20  3:46 PM   Result Value Ref Range    WBC 5.1 4.1 - 11.1 K/uL    RBC 4.23 4. 10 - 5.70 M/uL    HGB 13.1 12.1 - 17.0 %    HCT 39.5 36.6 - 50.3 %    MCV 93.4 80.0 - 99.0 FL    MCH 31.0 26.0 - 34.0 PG    MCHC 33.2 30.0 - 36.5 g/dL    RDW 15.1 (H) 11.5 - 14.5 %    PLATELET 96 (L) 389 - 400 K/uL    MPV 11.7 8.9 - 12.9 FL    NEUTROPHILS 61 32 - 75 %    LYMPHOCYTES 29 12 - 49 %    MONOCYTES 8 5 - 13 %    EOSINOPHILS 2 0 - 7 %    BASOPHILS 0 0 - 1 %    IMMATURE GRANULOCYTES 0 0.0 - 0.5 %    ABS. NEUTROPHILS 3.1 1.8 - 8.0 K/UL    ABS. LYMPHOCYTES 1.5 0.8 - 3.5 K/UL    ABS. MONOCYTES 0.4 0.0 - 1.0 K/UL    ABS. EOSINOPHILS 0.1 0.0 - 0.4 K/UL    ABS. BASOPHILS 0.0 0.0 - 0.1 K/UL    ABS. IMM.  GRANS. 0.0 0.00 - 0.04 K/UL    DF AUTOMATED     METABOLIC PANEL, COMPREHENSIVE    Collection Time: 09/15/20  3:46 PM   Result Value Ref Range    Sodium 145 136 - 145 mmol/L    Potassium 3.5 3.5 - 5.1 mmol/L    Chloride 109 (H) 97 - 108 mmol/L    CO2 27 21 - 32 mmol/L    Anion gap 9 5 - 15 mmol/L    Glucose 127 (H) 65 - 100 mg/dL    BUN 13 6 - 20 mg/dL    Creatinine 0.81 0.70 - 1.30 mg/dL    BUN/Creatinine ratio 16 12 - 20      GFR est AA >60 >60 ml/min/1.73m2    GFR est non-AA >60 >60 ml/min/1.73m2    Calcium 8.6 8.5 - 10.1 mg/dL    Bilirubin, total 0.5 0.2 - 1.0 mg/dL    AST (SGOT) 24 15 - 37 U/L    ALT (SGPT) 32 12 - 78 U/L    Alk.  phosphatase 85 45 - 117 U/L    Protein, total 6.9 6.4 - 8.2 g/dL    Albumin 3.1 (L) 3.5 - 5.0 g/dL    Globulin 3.8 2.0 - 4.0 g/dL    A-G Ratio 0.8 (L) 1.1 - 2.2     VANCOMYCIN, TROUGH    Collection Time: 09/15/20  3:46 PM   Result Value Ref Range    Vancomycin,trough 9.7 5.0 - 10.0 ug/mL         Assessment:     Acute hypoxemic respiratory failure on 4 L  Pneumonia with Klebsiella pneumonia Proteus mirabilis and MRSA on IV vancomycin and Zosyn  COVID-19 pneumonitis  COVID-19 not due to HIV  Severe dehydration  HIV disease  Hepatitis B  History of neurosyphilis  History of transitional cell bladder cancer  Chronic pain syndrome  Thrombocytopenia  Neutropenia  Hyponatremia  No sepsis         If oxygen saturation stable possible discharge to nursing home tomorrow      Plan:       Continue current medications  Overall prognosis very poor  Still on 4 L oxygen by nasal cannula unable to wean  Unable to wean  Continue current medications     Overall poor prognosis  PT OT consult  Repeat the labs    If oxygen saturation stable possible discharge to nursing home tomorrow    Current Facility-Administered Medications:     vancomycin (VANCOCIN) 1,000 mg in 0.9% sodium chloride 250 mL (VIAL-MATE), 1,000 mg, IntraVENous, Q12H, Barry Villasenor MD, 1,000 mg at 09/16/20 0238    cyanocobalamin tablet 1,000 mcg, 1,000 mcg, Oral, DAILY, Barry Villasenor MD, 1,000 mcg at 09/16/20 1049    emtricitabine-tenofovir (TDF) (TRUVADA) 200-300 mg per tablet 1 Tab, 1 Tab, Oral, DAILY, Vickki Lennox, MD, 1 Tab at 09/16/20 0900    enoxaparin (LOVENOX) injection 40 mg, 40 mg, SubCUTAneous, Q24H, Barry Villasenor MD, 40 mg at 09/15/20 1211    pantoprazole (PROTONIX) tablet 40 mg, 40 mg, Oral, ACB, Barry Villasenor MD, 40 mg at 09/16/20 1049    piperacillin-tazobactam (ZOSYN) 3.375 g in 0.9% sodium chloride (MBP/ADV) 100 mL MBP, 3.375 g, IntraVENous, Q8H, Barry Villasenor MD, Last Rate: 25 mL/hr at 09/16/20 0408, 3.375 g at 09/16/20 0408    predniSONE (DELTASONE) tablet 20 mg, 20 mg, Per G Tube, DAILY, Barry Villasenor MD, 20 mg at 09/16/20 1048    raltegravir (ISENTRESS) tablet 400 mg, 400 mg, Oral, BID, Barry Villasenor MD, 400 mg at 09/16/20 1048    VANCOMYCIN INFORMATION NOTE, , Other, Rx Dosing/Monitoring, Barry Villasenor MD    thiamine mononitrate (B-1) tablet 50 mg, 50 mg, Oral, DAILY, Barry Villasenor MD, 50 mg at 09/16/20 1049    ziprasidone (GEODON) capsule 40 mg, 40 mg, Per G Tube, BID WITH MEALS, Vickki Lennox, MD, 40 mg at 09/16/20 1049    acetaminophen (TYLENOL) suppository 650 mg, 650 mg, Rectal, Q4H PRN, Barry Villasenor MD    acetaminophen (TYLENOL) tablet 650 mg, 650 mg, Oral, Q6H PRN, Vickki Lennox, MD

## 2020-09-17 LAB
DATE LAST DOSE: ABNORMAL
REPORTED DOSE,DOSE: ABNORMAL UNITS
VANCOMYCIN TROUGH SERPL-MCNC: 13.2 UG/ML (ref 5–10)

## 2020-09-17 PROCEDURE — 74011000258 HC RX REV CODE- 258: Performed by: INTERNAL MEDICINE

## 2020-09-17 PROCEDURE — 74011250637 HC RX REV CODE- 250/637: Performed by: FAMILY MEDICINE

## 2020-09-17 PROCEDURE — 65270000029 HC RM PRIVATE

## 2020-09-17 PROCEDURE — 74011250636 HC RX REV CODE- 250/636: Performed by: FAMILY MEDICINE

## 2020-09-17 PROCEDURE — 74011000258 HC RX REV CODE- 258: Performed by: FAMILY MEDICINE

## 2020-09-17 PROCEDURE — 74011250636 HC RX REV CODE- 250/636: Performed by: INTERNAL MEDICINE

## 2020-09-17 PROCEDURE — 36415 COLL VENOUS BLD VENIPUNCTURE: CPT

## 2020-09-17 PROCEDURE — 74011636637 HC RX REV CODE- 636/637: Performed by: FAMILY MEDICINE

## 2020-09-17 PROCEDURE — 80202 ASSAY OF VANCOMYCIN: CPT

## 2020-09-17 RX ORDER — ASPIRIN 325 MG/1
50 TABLET, FILM COATED ORAL DAILY
Qty: 30 TAB | Refills: 0 | Status: SHIPPED | OUTPATIENT
Start: 2020-09-18

## 2020-09-17 RX ORDER — DOXYCYCLINE 100 MG/1
100 CAPSULE ORAL 2 TIMES DAILY
Qty: 20 CAP | Refills: 0 | Status: SHIPPED | OUTPATIENT
Start: 2020-09-17 | End: 2020-10-29

## 2020-09-17 RX ORDER — ZIPRASIDONE HYDROCHLORIDE 40 MG/1
40 CAPSULE ORAL 2 TIMES DAILY WITH MEALS
Qty: 30 CAP | Refills: 0 | Status: SHIPPED | OUTPATIENT
Start: 2020-09-17 | End: 2020-10-29

## 2020-09-17 RX ORDER — PREDNISONE 20 MG/1
20 TABLET ORAL DAILY
Qty: 30 TAB | Refills: 0 | Status: SHIPPED | OUTPATIENT
Start: 2020-09-18 | End: 2020-10-29

## 2020-09-17 RX ADMIN — PIPERACILLIN SODIUM AND TAZOBACTAM SODIUM 3.38 G: 3; .375 INJECTION, POWDER, LYOPHILIZED, FOR SOLUTION INTRAVENOUS at 10:17

## 2020-09-17 RX ADMIN — PIPERACILLIN SODIUM AND TAZOBACTAM SODIUM 3.38 G: 3; .375 INJECTION, POWDER, LYOPHILIZED, FOR SOLUTION INTRAVENOUS at 02:27

## 2020-09-17 RX ADMIN — PIPERACILLIN SODIUM AND TAZOBACTAM SODIUM 3.38 G: 3; .375 INJECTION, POWDER, LYOPHILIZED, FOR SOLUTION INTRAVENOUS at 20:00

## 2020-09-17 RX ADMIN — SODIUM CHLORIDE 1000 MG: 9 INJECTION, SOLUTION INTRAVENOUS at 14:56

## 2020-09-17 RX ADMIN — ZIPRASIDONE HCL 40 MG: 40 CAPSULE ORAL at 10:18

## 2020-09-17 RX ADMIN — RALTEGRAVIR 400 MG: 400 TABLET, FILM COATED ORAL at 18:15

## 2020-09-17 RX ADMIN — SODIUM CHLORIDE 1000 MG: 9 INJECTION, SOLUTION INTRAVENOUS at 00:20

## 2020-09-17 RX ADMIN — RALTEGRAVIR 400 MG: 400 TABLET, FILM COATED ORAL at 10:18

## 2020-09-17 RX ADMIN — PREDNISONE 20 MG: 20 TABLET ORAL at 10:19

## 2020-09-17 RX ADMIN — ENOXAPARIN SODIUM 40 MG: 40 INJECTION SUBCUTANEOUS at 10:17

## 2020-09-17 RX ADMIN — ZIPRASIDONE HCL 40 MG: 40 CAPSULE ORAL at 18:15

## 2020-09-17 RX ADMIN — CYANOCOBALAMIN TAB 1000 MCG 1000 MCG: 1000 TAB at 10:19

## 2020-09-17 NOTE — PROGRESS NOTES
Vancomycin Note    Admission date [id number]   Attending Carol Biswas   Indication PNA        Height Ht Readings from Last 1 Encounters:   09/11/20 177.8 cm (70\")       Weight Wt Readings from Last 1 Encounters:   09/17/20 86.7 kg (191 lb 2.2 oz)      IBW Ideal body weight: 73 kg (160 lb 15 oz)    SCr Creatinine   Date Value Ref Range Status   09/15/2020 0.81 0.70 - 1.30 mg/dL Final   09/12/2020 0.78 0.70 - 1.30 mg/dL Final      CrCl (based on IBW) 91.4 ml/min           Current regimen 1000 mg Q12H   Trough Scheduled for 9/18 at 1100         Current Antimicrobial Therapy (168h ago, onward)      Ordered     Start Stop    09/17/20 0348  piperacillin-tazobactam (ZOSYN) 3.375 g in 0.9% sodium chloride (MBP/ADV) 100 mL MBP  3.375 g,   IntraVENous,   EVERY 8 HOURS      09/17/20 1030 --    09/17/20 1314  doxycycline (MONODOX) 100 mg capsule  100 mg,   Oral,   2 TIMES DAILY      09/17/20 0000 --    09/13/20 2032  Vancomycin trough due 09/15/2020 @ 1600 (red top tube)  Other,   ONCE      09/15/20 1600 09/16/20 0359    09/13/20 2032  vancomycin (VANCOCIN) 1,000 mg in 0.9% sodium chloride 250 mL (VIAL-MATE)  1,000 mg,   IntraVENous,   EVERY 12 HOURS      09/14/20 0600 --    09/11/20 1403  emtricitabine-tenofovir (TDF) (TRUVADA) 200-300 mg per tablet 1 Tab  1 Tab,   Oral,   DAILY      09/12/20 0900 --    09/11/20 1403  raltegravir (ISENTRESS) tablet 400 mg  400 mg,   Oral,   2 TIMES DAILY      09/12/20 0900 --    09/11/20 1403  VANCOMYCIN INFORMATION NOTE  Other,   RX DOSING/MONITORING      09/12/20 0500 --          Trough today is 13.2. However, this is not a true trough because the level was drawn after the interval was off-schedule. Will continue current regimen for another few doses at the Q12H interval to get true trough tomorrow.     Submitted by: DRE MixonD

## 2020-09-17 NOTE — PROGRESS NOTES
Discharge Summary       PATIENT ID: Author Troncoso  MRN: [id number]   YOB: 1950    DATE OF ADMISSION: 8/29/2020  6:25 AM    DATE OF DISCHARGE:   PRIMARY CARE PROVIDER: Leslee dEmond MD     ATTENDING PHYSICIAN: Lanette Bonilla  DISCHARGING PROVIDER: Ron Villasenor      CONSULTATIONS: None    PROCEDURES/SURGERIES: * No surgery found *    ADMITTING DIAGNOSES:    Patient Active Problem List    Diagnosis Date Noted    SOB (shortness of breath) 09/12/2020       DISCHARGE DIAGNOSES / PLAN:    Acute hypoxemic respiratory failure on 4 L  Pneumonia with Klebsiella pneumonia Proteus mirabilis and MRSA on IV vancomycin and Zosyn  COVID-19 pneumonitis  COVID-19 not due to HIV  Severe dehydration  HIV disease  Hepatitis B  History of neurosyphilis  History of transitional cell bladder cancer  Chronic pain syndrome  Thrombocytopenia  Neutropenia  Hyponatremia  No sepsis     Active Hospital Problems    Diagnosis Date Noted    SOB (shortness of breath) 09/12/2020 1. ADDITIONAL CARE RECOMMENDATIONS:         DISCHARGE MEDICATIONS:  Current Discharge Medication List      START taking these medications    Details   !! ziprasidone (GEODON) 40 mg capsule 1 Cap by Per G Tube route two (2) times daily (with meals). Qty: 30 Cap, Refills: 0      predniSONE (DELTASONE) 20 mg tablet 20 mg by Per G Tube route daily. Indications: polyarteritis nodosa  Qty: 30 Tab, Refills: 0      thiamine mononitrate (B-1) 100 mg tablet Take 0.5 Tabs by mouth daily. Qty: 30 Tab, Refills: 0       !! - Potential duplicate medications found. Please discuss with provider. CONTINUE these medications which have NOT CHANGED    Details   albuterol (PROVENTIL VENTOLIN) 2.5 mg /3 mL (0.083 %) nebu 2.5 mg by Nebulization route every two (2) hours as needed for Wheezing or Shortness of Breath. cyanocobalamin 1,000 mcg tablet Take 1,000 mcg by mouth daily.       emtricitabine-tenofovir, TDF, (TRUVADA) 200-300 mg per tablet Take 1 Tab by mouth daily. lansoprazole (PREVACID) 30 mg capsule 30 mg by Per G Tube route Daily (before breakfast). raltegravir (ISENTRESS) 400 mg tablet Take 400 mg by mouth two (2) times a day. thiamine (B-1) 50 mg tablet Take 50 mg by mouth daily. !! ziprasidone (GEODON) 40 mg capsule 40 mg by Per G Tube route two (2) times daily (with meals). !! - Potential duplicate medications found. Please discuss with provider. STOP taking these medications       acetaminophen (TYLENOL) 325 mg tablet Comments:   Reason for Stopping:         albuterol sulfate (PROVENTIL;VENTOLIN) 2.5 mg/0.5 mL nebu nebulizer solution Comments:   Reason for Stopping:         fentaNYL (DURAGESIC) 25 mcg/hr PATCH Comments:   Reason for Stopping:                 NOTIFY YOUR PHYSICIAN FOR ANY OF THE FOLLOWING:   Fever over 101 degrees for 24 hours. Chest pain, shortness of breath, fever, chills, nausea, vomiting, diarrhea, change in mentation, falling, weakness, bleeding. Severe pain or pain not relieved by medications. Or, any other signs or symptoms that you may have questions about. DISPOSITION:  x  Home With:   OT  PT  HH  RN       Long term SNF/Inpatient Rehab    Independent/assisted living    Hospice    Other:       PATIENT CONDITION AT DISCHARGE: Stable      PHYSICAL EXAMINATION AT DISCHARGE:  General:          Alert, cooperative, no distress, appears stated age. HEENT:           Atraumatic, anicteric sclerae, pink conjunctivae                          No oral ulcers, mucosa moist, throat clear, dentition fair  Neck:               Supple, symmetrical  Lungs:             Clear to auscultation bilaterally. No Wheezing or Rhonchi. No rales. Chest wall:      No tenderness  No Accessory muscle use. Heart:              Regular  rhythm,  No  murmur   No edema  Abdomen:        Soft, non-tender. Not distended. Bowel sounds normal  Extremities:     No cyanosis.   No clubbing,                            Skin turgor normal, Capillary refill normal  Skin:                Not pale. Not Jaundiced  No rashes   Psych:             Not anxious or agitated. Neurologic:      Alert, moves all extremities, answers questions appropriately and responds to commands     No orders to display        No results found for this or any previous visit (from the past 24 hour(s)).        HOSPITAL COURSE:  To present illness please refer to history and physical at the time of the admission as the patient was admitted because of acute hypoxemic respiratory failure pneumonia with Klebsiella pneumonia and MRSA patient was IV treated with IV vancomycin and Zosyn COVID-19 was positive seen by the pulmonologist and also seen by the nephrologist patient was hypoxemic for a while now patient was weaned off on room air saturation above 90% patient bedbound nonverbal active failure patient received course of IV antibiotic will discharge back to the nursing home on p.o. doxycycline    Medication reconciliation done  Time to discharge patient 35 minutes    Signed:   Macho Lizama MD  9/17/2020  1:09 PM

## 2020-09-17 NOTE — PROGRESS NOTES
Pulmonary Progress Note    Subjective:   Daily Progress Note: 2020 1:58 PM    CC: From here arm diabetes with fever aspiration    HPI: 68-year-old male came in with shortness of breath, [place] he was COVID positive also is HIV positive    Patient in distress on oxygen nasal cannula mouth open but does not communicate  He is removing his oxygen nasal cannula  Now he is on room air saturation about 90%  Review of Systems  Unable to obtain    Objective:     Visit Vitals  /89   Pulse 78   Temp 97.5 °F (36.4 °C)   Resp 18   Ht 5' 10\" (1.778 m)   Wt 86.8 kg (191 lb 5.8 oz)   SpO2 94%   BMI 27.46 kg/m²    O2 Flow Rate (L/min): 4 l/min O2 Device: Room air    Temp (24hrs), Av.6 °F (36.4 °C), Min:97.3 °F (36.3 °C), Max:98.3 °F (36.8 °C)      No intake/output data recorded. 09/15 1901 -  0700  In: 2560   Out: 1700 [Urine:1700]    Visit Vitals  /89   Pulse 78   Temp 97.5 °F (36.4 °C)   Resp 18   Ht 5' 10\" (1.778 m)   Wt 86.8 kg (191 lb 5.8 oz)   SpO2 94%   BMI 27.46 kg/m²     General:  Alert, cooperative, no distress, appears stated age. Head:  Normocephalic, without obvious abnormality, atraumatic. Eyes:  Conjunctivae/corneas clear. PERRL, EOMs intact. Fundi benign   Ears:  Normal TMs and external ear canals both ears. Nose: Nares normal. Septum midline. Mucosa normal. No drainage or sinus tenderness. Throat: Lips, mucosa, and tongue normal. Teeth and gums normal.   Neck: Supple, symmetrical, trachea midline, no adenopathy, thyroid: no enlargement/tenderness/nodules, no carotid bruit and no JVD. Back:   Symmetric, no curvature. ROM normal. No CVA tenderness. Lungs:   Clear to auscultation bilaterally. Chest wall:  No tenderness or deformity. Heart:  Regular rate and rhythm, S1, S2 normal, no murmur, click, rub or gallop. Abdomen:   Soft, non-tender. Bowel sounds normal. No masses,  No organomegaly. Genitalia:  Normal male without lesion, discharge or tenderness.    Rectal: Normal tone, normal prostate, no masses or tenderness  Guaiac negative stool. Extremities: Extremities normal, atraumatic, no cyanosis or edema. Pulses: 2+ and symmetric all extremities. Skin: Skin color, texture, turgor normal. No rashes or lesions   Lymph nodes: Cervical, supraclavicular, and axillary nodes normal.               Data Review    No results found for this or any previous visit (from the past 24 hour(s)).     Current Facility-Administered Medications   Medication Dose Route Frequency    piperacillin-tazobactam (ZOSYN) 3.375 g in 0.9% sodium chloride (MBP/ADV) 100 mL MBP  3.375 g IntraVENous Q8H    vancomycin (VANCOCIN) 1,000 mg in 0.9% sodium chloride 250 mL (VIAL-MATE)  1,000 mg IntraVENous Q12H    cyanocobalamin tablet 1,000 mcg  1,000 mcg Oral DAILY    emtricitabine-tenofovir (TDF) (TRUVADA) 200-300 mg per tablet 1 Tab  1 Tab Oral DAILY    enoxaparin (LOVENOX) injection 40 mg  40 mg SubCUTAneous Q24H    pantoprazole (PROTONIX) tablet 40 mg  40 mg Oral ACB    predniSONE (DELTASONE) tablet 20 mg  20 mg Per G Tube DAILY    raltegravir (ISENTRESS) tablet 400 mg  400 mg Oral BID    VANCOMYCIN INFORMATION NOTE   Other Rx Dosing/Monitoring    thiamine mononitrate (B-1) tablet 50 mg  50 mg Oral DAILY    ziprasidone (GEODON) capsule 40 mg  40 mg Per G Tube BID WITH MEALS    acetaminophen (TYLENOL) suppository 650 mg  650 mg Rectal Q4H PRN    acetaminophen (TYLENOL) tablet 650 mg  650 mg Oral Q6H PRN         Assessment/Plan:     COVID-19 pneumonia  Acute  hypoxic respiratory failure resolved  HIV positive  History of transitional cell bladder cancer  Klebsiella pneumonia  Continue to wean oxygen per protocol he is right now he is on room air saturation is about 90%   patient was treated with Zosyn Lovenox prednisone Zithromax and  Actemra  Awaiting for placement

## 2020-09-17 NOTE — PROGRESS NOTES
Problem: Risk for Spread of Infection  Goal: Prevent transmission of infectious organism to others  Description: Prevent the transmission of infectious organisms to other patients, staff members, and visitors. Outcome: Progressing Towards Goal     Problem: Patient Education:  Go to Education Activity  Goal: Patient/Family Education  Outcome: Progressing Towards Goal     Problem: Falls - Risk of  Goal: *Absence of Falls  Description: Document Ralf Hunter Fall Risk and appropriate interventions in the flowsheet. Outcome: Progressing Towards Goal  Note: Fall Risk Interventions:                                Problem: Patient Education: Go to Patient Education Activity  Goal: Patient/Family Education  Outcome: Progressing Towards Goal     Problem: Pressure Injury - Risk of  Goal: *Prevention of pressure injury  Description: Document Jw Scale and appropriate interventions in the flowsheet.   Outcome: Progressing Towards Goal  Note: Pressure Injury Interventions:  Sensory Interventions: Assess changes in LOC    Moisture Interventions: Absorbent underpads                                    Problem: Patient Education: Go to Patient Education Activity  Goal: Patient/Family Education  Outcome: Progressing Towards Goal

## 2020-09-18 PROCEDURE — 74011250636 HC RX REV CODE- 250/636: Performed by: INTERNAL MEDICINE

## 2020-09-18 PROCEDURE — 74011250636 HC RX REV CODE- 250/636: Performed by: FAMILY MEDICINE

## 2020-09-18 PROCEDURE — 74011636637 HC RX REV CODE- 636/637: Performed by: FAMILY MEDICINE

## 2020-09-18 PROCEDURE — 74011250637 HC RX REV CODE- 250/637: Performed by: FAMILY MEDICINE

## 2020-09-18 PROCEDURE — 65270000029 HC RM PRIVATE

## 2020-09-18 PROCEDURE — 74011000258 HC RX REV CODE- 258: Performed by: INTERNAL MEDICINE

## 2020-09-18 PROCEDURE — 87635 SARS-COV-2 COVID-19 AMP PRB: CPT

## 2020-09-18 PROCEDURE — 94762 N-INVAS EAR/PLS OXIMTRY CONT: CPT

## 2020-09-18 RX ADMIN — PIPERACILLIN SODIUM AND TAZOBACTAM SODIUM 3.38 G: 3; .375 INJECTION, POWDER, LYOPHILIZED, FOR SOLUTION INTRAVENOUS at 21:05

## 2020-09-18 RX ADMIN — PIPERACILLIN SODIUM AND TAZOBACTAM SODIUM 3.38 G: 3; .375 INJECTION, POWDER, LYOPHILIZED, FOR SOLUTION INTRAVENOUS at 17:42

## 2020-09-18 RX ADMIN — PIPERACILLIN SODIUM AND TAZOBACTAM SODIUM 3.38 G: 3; .375 INJECTION, POWDER, LYOPHILIZED, FOR SOLUTION INTRAVENOUS at 10:08

## 2020-09-18 RX ADMIN — ENOXAPARIN SODIUM 40 MG: 40 INJECTION SUBCUTANEOUS at 13:30

## 2020-09-18 RX ADMIN — ZIPRASIDONE HCL 40 MG: 40 CAPSULE ORAL at 16:27

## 2020-09-18 RX ADMIN — SODIUM CHLORIDE 1000 MG: 9 INJECTION, SOLUTION INTRAVENOUS at 01:59

## 2020-09-18 RX ADMIN — THIAMINE HCL TAB 100 MG 50 MG: 100 TAB at 10:09

## 2020-09-18 RX ADMIN — ZIPRASIDONE HCL 40 MG: 40 CAPSULE ORAL at 13:22

## 2020-09-18 RX ADMIN — PIPERACILLIN SODIUM AND TAZOBACTAM SODIUM 3.38 G: 3; .375 INJECTION, POWDER, LYOPHILIZED, FOR SOLUTION INTRAVENOUS at 03:39

## 2020-09-18 RX ADMIN — CYANOCOBALAMIN TAB 1000 MCG 1000 MCG: 1000 TAB at 10:10

## 2020-09-18 RX ADMIN — PREDNISONE 20 MG: 20 TABLET ORAL at 10:10

## 2020-09-18 RX ADMIN — EMTRICITABINE AND TENOFOVIR DISOPROXIL FUMARATE 1 TABLET: 200; 300 TABLET, FILM COATED ORAL at 09:00

## 2020-09-18 RX ADMIN — RALTEGRAVIR 400 MG: 400 TABLET, FILM COATED ORAL at 18:00

## 2020-09-18 RX ADMIN — RALTEGRAVIR 400 MG: 400 TABLET, FILM COATED ORAL at 16:27

## 2020-09-18 RX ADMIN — RALTEGRAVIR 400 MG: 400 TABLET, FILM COATED ORAL at 09:00

## 2020-09-18 RX ADMIN — SODIUM CHLORIDE 1000 MG: 9 INJECTION, SOLUTION INTRAVENOUS at 13:25

## 2020-09-18 NOTE — PROGRESS NOTES
Pulmonary Progress Note    Subjective:   Daily Progress Note: 2020 1:58 PM    CC: From here arm diabetes with fever aspiration    HPI: 73-year-old male came in with shortness of breath, [address] he was COVID positive also is HIV positive    Patient in distress on oxygen nasal cannula mouth open but does not communicate  He is removing his oxygen nasal cannula  Now he is on room air saturation about 90%  Review of Systems  Unable to obtain    Objective:     Visit Vitals  /77   Pulse 81   Temp 97.7 °F (36.5 °C)   Resp 18   Ht 5' 10\" (1.778 m)   Wt 84 kg (185 lb 3 oz)   SpO2 94%   BMI 26.57 kg/m²    O2 Flow Rate (L/min): 4 l/min O2 Device: Room air    Temp (24hrs), Av.7 °F (36.5 °C), Min:97.5 °F (36.4 °C), Max:97.8 °F (36.6 °C)      No intake/output data recorded.  1901 -  0700  In: 2760   Out: 3250 [Urine:3250]    Visit Vitals  /77   Pulse 81   Temp 97.7 °F (36.5 °C)   Resp 18   Ht 5' 10\" (1.778 m)   Wt 84 kg (185 lb 3 oz)   SpO2 94%   BMI 26.57 kg/m²     General:  Alert, cooperative, no distress, appears stated age. Head:  Normocephalic, without obvious abnormality, atraumatic. Eyes:  Conjunctivae/corneas clear. PERRL, EOMs intact. Fundi benign   Ears:  Normal TMs and external ear canals both ears. Nose: Nares normal. Septum midline. Mucosa normal. No drainage or sinus tenderness. Throat: Lips, mucosa, and tongue normal. Teeth and gums normal.   Neck: Supple, symmetrical, trachea midline, no adenopathy, thyroid: no enlargement/tenderness/nodules, no carotid bruit and no JVD. Back:   Symmetric, no curvature. ROM normal. No CVA tenderness. Lungs:   Clear to auscultation bilaterally. Chest wall:  No tenderness or deformity. Heart:  Regular rate and rhythm, S1, S2 normal, no murmur, click, rub or gallop. Abdomen:   Soft, non-tender. Bowel sounds normal. No masses,  No organomegaly. Genitalia:  Normal male without lesion, discharge or tenderness.    Rectal:  Normal tone, normal prostate, no masses or tenderness  Guaiac negative stool. Extremities: Extremities normal, atraumatic, no cyanosis or edema. Pulses: 2+ and symmetric all extremities.    Skin: Skin color, texture, turgor normal. No rashes or lesions   Lymph nodes: Cervical, supraclavicular, and axillary nodes normal.               Data Review    Recent Results (from the past 24 hour(s))   VANCOMYCIN, TROUGH    Collection Time: 09/17/20 12:21 PM   Result Value Ref Range    Vancomycin,trough 13.2 (H) 5.0 - 10.0 ug/mL    Reported dose date Not provided      Reported dose: Not provided Units       Current Facility-Administered Medications   Medication Dose Route Frequency    piperacillin-tazobactam (ZOSYN) 3.375 g in 0.9% sodium chloride (MBP/ADV) 100 mL MBP  3.375 g IntraVENous Q8H    vancomycin (VANCOCIN) 1,000 mg in 0.9% sodium chloride 250 mL (VIAL-MATE)  1,000 mg IntraVENous Q12H    cyanocobalamin tablet 1,000 mcg  1,000 mcg Oral DAILY    emtricitabine-tenofovir (TDF) (TRUVADA) 200-300 mg per tablet 1 Tab  1 Tab Oral DAILY    enoxaparin (LOVENOX) injection 40 mg  40 mg SubCUTAneous Q24H    pantoprazole (PROTONIX) tablet 40 mg  40 mg Oral ACB    predniSONE (DELTASONE) tablet 20 mg  20 mg Per G Tube DAILY    raltegravir (ISENTRESS) tablet 400 mg  400 mg Oral BID    VANCOMYCIN INFORMATION NOTE   Other Rx Dosing/Monitoring    thiamine mononitrate (B-1) tablet 50 mg  50 mg Oral DAILY    ziprasidone (GEODON) capsule 40 mg  40 mg Per G Tube BID WITH MEALS    acetaminophen (TYLENOL) suppository 650 mg  650 mg Rectal Q4H PRN    acetaminophen (TYLENOL) tablet 650 mg  650 mg Oral Q6H PRN         Assessment/Plan:     COVID-19 pneumonia  Acute  hypoxic respiratory failure resolved  HIV positive  History of transitional cell bladder cancer  Klebsiella pneumonia  Continue to wean oxygen per protocol he is right now he is on room air saturation is about 90%   patient was treated with Zosyn Lovenox prednisone Zithromax and  Actemra  Awaiting for placement

## 2020-09-19 LAB
DATE LAST DOSE: ABNORMAL
REPORTED DOSE,DOSE: ABNORMAL UNITS
SARS-COV-2, COV2: NORMAL
VANCOMYCIN TROUGH SERPL-MCNC: 13 UG/ML (ref 5–10)

## 2020-09-19 PROCEDURE — 80202 ASSAY OF VANCOMYCIN: CPT

## 2020-09-19 PROCEDURE — 74011636637 HC RX REV CODE- 636/637: Performed by: FAMILY MEDICINE

## 2020-09-19 PROCEDURE — 74011000258 HC RX REV CODE- 258: Performed by: INTERNAL MEDICINE

## 2020-09-19 PROCEDURE — 74011250637 HC RX REV CODE- 250/637: Performed by: FAMILY MEDICINE

## 2020-09-19 PROCEDURE — 74011250636 HC RX REV CODE- 250/636: Performed by: INTERNAL MEDICINE

## 2020-09-19 PROCEDURE — 65270000029 HC RM PRIVATE

## 2020-09-19 PROCEDURE — 74011250636 HC RX REV CODE- 250/636: Performed by: FAMILY MEDICINE

## 2020-09-19 RX ADMIN — PANTOPRAZOLE SODIUM 40 MG: 40 TABLET, DELAYED RELEASE ORAL at 08:35

## 2020-09-19 RX ADMIN — ENOXAPARIN SODIUM 40 MG: 40 INJECTION SUBCUTANEOUS at 08:34

## 2020-09-19 RX ADMIN — SODIUM CHLORIDE 1000 MG: 9 INJECTION, SOLUTION INTRAVENOUS at 01:30

## 2020-09-19 RX ADMIN — PREDNISONE 20 MG: 20 TABLET ORAL at 08:34

## 2020-09-19 RX ADMIN — PIPERACILLIN SODIUM AND TAZOBACTAM SODIUM 3.38 G: 3; .375 INJECTION, POWDER, LYOPHILIZED, FOR SOLUTION INTRAVENOUS at 20:05

## 2020-09-19 RX ADMIN — ZIPRASIDONE HCL 40 MG: 40 CAPSULE ORAL at 08:35

## 2020-09-19 RX ADMIN — ZIPRASIDONE HCL 40 MG: 40 CAPSULE ORAL at 17:25

## 2020-09-19 RX ADMIN — RALTEGRAVIR 400 MG: 400 TABLET, FILM COATED ORAL at 08:35

## 2020-09-19 RX ADMIN — RALTEGRAVIR 400 MG: 400 TABLET, FILM COATED ORAL at 17:25

## 2020-09-19 RX ADMIN — SODIUM CHLORIDE 1000 MG: 9 INJECTION, SOLUTION INTRAVENOUS at 20:05

## 2020-09-19 RX ADMIN — PIPERACILLIN SODIUM AND TAZOBACTAM SODIUM 3.38 G: 3; .375 INJECTION, POWDER, LYOPHILIZED, FOR SOLUTION INTRAVENOUS at 12:28

## 2020-09-19 RX ADMIN — CYANOCOBALAMIN TAB 1000 MCG 1000 MCG: 1000 TAB at 08:35

## 2020-09-19 RX ADMIN — EMTRICITABINE AND TENOFOVIR DISOPROXIL FUMARATE 1 TABLET: 200; 300 TABLET, FILM COATED ORAL at 09:00

## 2020-09-19 RX ADMIN — PIPERACILLIN SODIUM AND TAZOBACTAM SODIUM 3.38 G: 3; .375 INJECTION, POWDER, LYOPHILIZED, FOR SOLUTION INTRAVENOUS at 04:26

## 2020-09-19 RX ADMIN — THIAMINE HCL TAB 100 MG 50 MG: 100 TAB at 08:34

## 2020-09-19 NOTE — PROGRESS NOTES
Discharge Summary       PATIENT ID: Suyapa Templeton  MRN: [id number]   YOB: 1950    DATE OF ADMISSION: 8/29/2020  6:25 AM    DATE OF DISCHARGE:   PRIMARY CARE PROVIDER: Trish Teran MD     ATTENDING PHYSICIAN: Augie Adorno  DISCHARGING PROVIDER: Kaia Villasenor      CONSULTATIONS: None    PROCEDURES/SURGERIES: * No surgery found *    ADMITTING DIAGNOSES:    Patient Active Problem List    Diagnosis Date Noted    SOB (shortness of breath) 09/12/2020       DISCHARGE DIAGNOSES / PLAN:    Acute hypoxemic respiratory failure on 4 L  Pneumonia with Klebsiella pneumonia Proteus mirabilis and MRSA on IV vancomycin and Zosyn  COVID-19 pneumonitis  COVID-19 not due to HIV  Severe dehydration  HIV disease  Hepatitis B  History of neurosyphilis  History of transitional cell bladder cancer  Chronic pain syndrome  Thrombocytopenia  Neutropenia  Hyponatremia  No sepsis     Active Hospital Problems    Diagnosis Date Noted    SOB (shortness of breath) 09/12/2020        ADDITIONAL CARE RECOMMENDATIONS:         DISCHARGE MEDICATIONS:  Current Discharge Medication List      START taking these medications    Details   !! ziprasidone (GEODON) 40 mg capsule 1 Cap by Per G Tube route two (2) times daily (with meals). Qty: 30 Cap, Refills: 0      predniSONE (DELTASONE) 20 mg tablet 20 mg by Per G Tube route daily. Indications: polyarteritis nodosa  Qty: 30 Tab, Refills: 0      thiamine mononitrate (B-1) 100 mg tablet Take 0.5 Tabs by mouth daily. Qty: 30 Tab, Refills: 0      doxycycline (MONODOX) 100 mg capsule Take 1 Cap by mouth two (2) times a day. Qty: 20 Cap, Refills: 0       !! - Potential duplicate medications found. Please discuss with provider. CONTINUE these medications which have NOT CHANGED    Details   albuterol (PROVENTIL VENTOLIN) 2.5 mg /3 mL (0.083 %) nebu 2.5 mg by Nebulization route every two (2) hours as needed for Wheezing or Shortness of Breath.       cyanocobalamin 1,000 mcg tablet Take 1,000 mcg by mouth daily. emtricitabine-tenofovir, TDF, (TRUVADA) 200-300 mg per tablet Take 1 Tab by mouth daily. lansoprazole (PREVACID) 30 mg capsule 30 mg by Per G Tube route Daily (before breakfast). raltegravir (ISENTRESS) 400 mg tablet Take 400 mg by mouth two (2) times a day. thiamine (B-1) 50 mg tablet Take 50 mg by mouth daily. !! ziprasidone (GEODON) 40 mg capsule 40 mg by Per G Tube route two (2) times daily (with meals). !! - Potential duplicate medications found. Please discuss with provider. STOP taking these medications       acetaminophen (TYLENOL) 325 mg tablet Comments:   Reason for Stopping:         albuterol sulfate (PROVENTIL;VENTOLIN) 2.5 mg/0.5 mL nebu nebulizer solution Comments:   Reason for Stopping:         fentaNYL (DURAGESIC) 25 mcg/hr PATCH Comments:   Reason for Stopping:                 NOTIFY YOUR PHYSICIAN FOR ANY OF THE FOLLOWING:   Fever over 101 degrees for 24 hours. Chest pain, shortness of breath, fever, chills, nausea, vomiting, diarrhea, change in mentation, falling, weakness, bleeding. Severe pain or pain not relieved by medications. Or, any other signs or symptoms that you may have questions about. DISPOSITION:  x  Home With:   OT  PT  HH  RN       Long term SNF/Inpatient Rehab    Independent/assisted living    Hospice    Other:       PATIENT CONDITION AT DISCHARGE: Stable      PHYSICAL EXAMINATION AT DISCHARGE:  General:          Alert, cooperative, no distress, appears stated age. HEENT:           Atraumatic, anicteric sclerae, pink conjunctivae                          No oral ulcers, mucosa moist, throat clear, dentition fair  Neck:               Supple, symmetrical  Lungs:             Clear to auscultation bilaterally. No Wheezing or Rhonchi. No rales. Chest wall:      No tenderness  No Accessory muscle use. Heart:              Regular  rhythm,  No  murmur   No edema  Abdomen:        Soft, non-tender. Not distended.   Bowel sounds normal  Extremities:     No cyanosis. No clubbing,                            Skin turgor normal, Capillary refill normal  Skin:                Not pale. Not Jaundiced  No rashes   Psych:             Not anxious or agitated.   Neurologic:      Alert, moves all extremities, answers questions appropriately and responds to commands     No orders to display        Recent Results (from the past 24 hour(s))   VANCOMYCIN, TROUGH    Collection Time: 09/19/20 10:12 AM   Result Value Ref Range    Vancomycin,trough 13.0 (H) 5.0 - 10.0 ug/mL    Reported dose date Not provided      Reported dose: Not provided Units          HOSPITAL COURSE:  To present illness please refer to history and physical at the time of the admission as the patient was admitted because of acute hypoxemic respiratory failure pneumonia with Klebsiella pneumonia and MRSA patient was IV treated with IV vancomycin and Zosyn COVID-19 was positive seen by the pulmonologist and also seen by the nephrologist patient was hypoxemic for a while now patient was weaned off on room air saturation above 90% patient bedbound nonverbal active failure patient received course of IV antibiotic will discharge back to the nursing home on p.o. doxycycline    Medication reconciliation done  Time to discharge patient 35 minutes    Signed:   Janina Frank MD  9/19/2020  1:09 PM

## 2020-09-19 NOTE — PROGRESS NOTES
Discharge Summary       PATIENT ID: Aubrey Gold  MRN: [id number]   YOB: 1950    DATE OF ADMISSION: 8/29/2020  6:25 AM    DATE OF DISCHARGE:   PRIMARY CARE PROVIDER: Brad Vega MD     ATTENDING PHYSICIAN: [address] [place] [address]  DISCHARGING PROVIDER: Anant Villasenor      CONSULTATIONS: None    PROCEDURES/SURGERIES: * No surgery found *    ADMITTING DIAGNOSES:    Patient Active Problem List    Diagnosis Date Noted    SOB (shortness of breath) 09/12/2020       DISCHARGE DIAGNOSES / PLAN:    Acute hypoxemic respiratory failure on 4 L  Pneumonia with Klebsiella pneumonia Proteus mirabilis and MRSA on IV vancomycin and Zosyn  COVID-19 pneumonitis  COVID-19 not due to HIV  Severe dehydration  HIV disease  Hepatitis B  History of neurosyphilis  History of transitional cell bladder cancer  Chronic pain syndrome  Thrombocytopenia  Neutropenia  Hyponatremia  No sepsis     Active Hospital Problems    Diagnosis Date Noted    SOB (shortness of breath) 09/12/2020        ADDITIONAL CARE RECOMMENDATIONS:         DISCHARGE MEDICATIONS:  Current Discharge Medication List      START taking these medications    Details   !! ziprasidone (GEODON) 40 mg capsule 1 Cap by Per G Tube route two (2) times daily (with meals). Qty: 30 Cap, Refills: 0      predniSONE (DELTASONE) 20 mg tablet 20 mg by Per G Tube route daily. Indications: polyarteritis nodosa  Qty: 30 Tab, Refills: 0      thiamine mononitrate (B-1) 100 mg tablet Take 0.5 Tabs by mouth daily. Qty: 30 Tab, Refills: 0      doxycycline (MONODOX) 100 mg capsule Take 1 Cap by mouth two (2) times a day. Qty: 20 Cap, Refills: 0       !! - Potential duplicate medications found. Please discuss with provider. CONTINUE these medications which have NOT CHANGED    Details   albuterol (PROVENTIL VENTOLIN) 2.5 mg /3 mL (0.083 %) nebu 2.5 mg by Nebulization route every two (2) hours as needed for Wheezing or Shortness of Breath.       cyanocobalamin 1,000 mcg tablet Take 1,000 mcg by mouth daily. emtricitabine-tenofovir, TDF, (TRUVADA) 200-300 mg per tablet Take 1 Tab by mouth daily. lansoprazole (PREVACID) 30 mg capsule 30 mg by Per G Tube route Daily (before breakfast). raltegravir (ISENTRESS) 400 mg tablet Take 400 mg by mouth two (2) times a day. thiamine (B-1) 50 mg tablet Take 50 mg by mouth daily. !! ziprasidone (GEODON) 40 mg capsule 40 mg by Per G Tube route two (2) times daily (with meals). !! - Potential duplicate medications found. Please discuss with provider. STOP taking these medications       acetaminophen (TYLENOL) 325 mg tablet Comments:   Reason for Stopping:         albuterol sulfate (PROVENTIL;VENTOLIN) 2.5 mg/0.5 mL nebu nebulizer solution Comments:   Reason for Stopping:         fentaNYL (DURAGESIC) 25 mcg/hr PATCH Comments:   Reason for Stopping:                 NOTIFY YOUR PHYSICIAN FOR ANY OF THE FOLLOWING:   Fever over 101 degrees for 24 hours. Chest pain, shortness of breath, fever, chills, nausea, vomiting, diarrhea, change in mentation, falling, weakness, bleeding. Severe pain or pain not relieved by medications. Or, any other signs or symptoms that you may have questions about. DISPOSITION:  x  Home With:   OT  PT  HH  RN       Long term SNF/Inpatient Rehab    Independent/assisted living    Hospice    Other:       PATIENT CONDITION AT DISCHARGE: Stable      PHYSICAL EXAMINATION AT DISCHARGE:  General:          Alert, cooperative, no distress, appears stated age. HEENT:           Atraumatic, anicteric sclerae, pink conjunctivae                          No oral ulcers, mucosa moist, throat clear, dentition fair  Neck:               Supple, symmetrical  Lungs:             Clear to auscultation bilaterally. No Wheezing or Rhonchi. No rales. Chest wall:      No tenderness  No Accessory muscle use. Heart:              Regular  rhythm,  No  murmur   No edema  Abdomen:        Soft, non-tender. Not distended.   Bowel sounds normal  Extremities:     No cyanosis. No clubbing,                            Skin turgor normal, Capillary refill normal  Skin:                Not pale. Not Jaundiced  No rashes   Psych:             Not anxious or agitated. Neurologic:      Alert, moves all extremities, answers questions appropriately and responds to commands     No orders to display        No results found for this or any previous visit (from the past 24 hour(s)).        HOSPITAL COURSE:  To present illness please refer to history and physical at the time of the admission as the patient was admitted because of acute hypoxemic respiratory failure pneumonia with Klebsiella pneumonia and MRSA patient was IV treated with IV vancomycin and Zosyn COVID-19 was positive seen by the pulmonologist and also seen by the nephrologist patient was hypoxemic for a while now patient was weaned off on room air saturation above 90% patient bedbound nonverbal active failure patient received course of IV antibiotic will discharge back to the nursing home on p.o. doxycycline    Medication reconciliation done  Time to discharge patient 35 minutes    Signed:   Mechelle Joseph MD  9/18/2020  1:09 PM

## 2020-09-19 NOTE — ROUTINE PROCESS
Bedside and Verbal shift change report given to Max Dixon (oncoming nurse) by Yovany Chong (offgoing nurse). Report included the following information SBAR and MAR.

## 2020-09-19 NOTE — PROGRESS NOTES
Vancomycin Note    Admission date 082920   Attending Ramez Hoang   Indication         Height Ht Readings from Last 1 Encounters:   09/18/20 177.8 cm (70\")       Weight Wt Readings from Last 1 Encounters:   09/18/20 84 kg (185 lb 3 oz)      IBW Ideal body weight: 73 kg (160 lb 15 oz)    SCr Creatinine   Date Value Ref Range Status   09/15/2020 0.81 0.70 - 1.30 mg/dL Final   09/12/2020 0.78 0.70 - 1.30 mg/dL Final      CrCl (based on IBW) 91.4 ml/min       Current regimen 1000 mg q12h   Trough Scheduled for 9/20 at 1900         Current Antimicrobial Therapy (168h ago, onward)      Ordered     Start Stop    09/19/20 1221  vancomycin (VANCOCIN) 1,000 mg in 0.9% sodium chloride 250 mL (VIAL-MATE)  1,000 mg,   IntraVENous,   EVERY 8 HOURS      09/19/20 1300 --    09/17/20 0348  piperacillin-tazobactam (ZOSYN) 3.375 g in 0.9% sodium chloride (MBP/ADV) 100 mL MBP  3.375 g,   IntraVENous,   EVERY 8 HOURS      09/17/20 1030 --    09/17/20 1314  doxycycline (MONODOX) 100 mg capsule  100 mg,   Oral,   2 TIMES DAILY      09/17/20 0000 --    09/13/20 2032  Vancomycin trough due 09/15/2020 @ 1600 (red top tube)  Other,   ONCE      09/15/20 1600 09/16/20 0359    09/11/20 1403  emtricitabine-tenofovir (TDF) (TRUVADA) 200-300 mg per tablet 1 Tab  1 Tab,   Oral,   DAILY      09/12/20 0900 --    09/11/20 1403  raltegravir (ISENTRESS) tablet 400 mg  400 mg,   Oral,   2 TIMES DAILY      09/12/20 0900 --    09/11/20 1403  VANCOMYCIN INFORMATION NOTE  Other,   RX DOSING/MONITORING      09/12/20 0500 --          Trough result = 13.0.     Recalculated half-life = 7.45 hours  Increased dose to 1000mg q8 hours with   Predicted Peak = 30.9   Predicted Trough = 16.1       Submitted by: Lucas Mc [facility]

## 2020-09-19 NOTE — PROGRESS NOTES
Pulmonary Progress Note    Subjective:   Daily Progress Note: 2020 1:58 PM    CC: From here arm diabetes with fever aspiration    HPI: 72-year-old male came in with shortness of breath, Donelda Grow he was COVID positive also is HIV positive    Patient in distress on oxygen nasal cannula mouth open but does not communicate  He is removing his oxygen nasal cannula  Now he is on room air saturation about 90%  Review of Systems  Unable to obtain    Objective:     Visit Vitals  /73   Pulse 88   Temp 98 °F (36.7 °C)   Resp 20   Ht 5' 10\" (1.778 m)   Wt 84 kg (185 lb 3 oz)   SpO2 94%   BMI 26.57 kg/m²    O2 Flow Rate (L/min): 4 l/min O2 Device: Room air    Temp (24hrs), Av.9 °F (36.6 °C), Min:97.7 °F (36.5 °C), Max:98 °F (36.7 °C)      No intake/output data recorded.  1901 -  0700  In: 600   Out: 4800 [Urine:4800]    Visit Vitals  /73   Pulse 88   Temp 98 °F (36.7 °C)   Resp 20   Ht 5' 10\" (1.778 m)   Wt 84 kg (185 lb 3 oz)   SpO2 94%   BMI 26.57 kg/m²     General:  Alert, cooperative, no distress, appears stated age. Head:  Normocephalic, without obvious abnormality, atraumatic. Eyes:  Conjunctivae/corneas clear. PERRL, EOMs intact. Fundi benign   Ears:  Normal TMs and external ear canals both ears. Nose: Nares normal. Septum midline. Mucosa normal. No drainage or sinus tenderness. Throat: Lips, mucosa, and tongue normal. Teeth and gums normal.   Neck: Supple, symmetrical, trachea midline, no adenopathy, thyroid: no enlargement/tenderness/nodules, no carotid bruit and no JVD. Back:   Symmetric, no curvature. ROM normal. No CVA tenderness. Lungs:   Clear to auscultation bilaterally. Chest wall:  No tenderness or deformity. Heart:  Regular rate and rhythm, S1, S2 normal, no murmur, click, rub or gallop. Abdomen:   Soft, non-tender. Bowel sounds normal. No masses,  No organomegaly. Genitalia:  Normal male without lesion, discharge or tenderness.    Rectal:  Normal tone, normal prostate, no masses or tenderness  Guaiac negative stool. Extremities: Extremities normal, atraumatic, no cyanosis or edema. Pulses: 2+ and symmetric all extremities.    Skin: Skin color, texture, turgor normal. No rashes or lesions   Lymph nodes: Cervical, supraclavicular, and axillary nodes normal.               Data Review    Recent Results (from the past 24 hour(s))   SARS-COV-2    Collection Time: 09/18/20 10:30 AM   Result Value Ref Range    SARS-CoV-2 Please find results under separate order         Current Facility-Administered Medications   Medication Dose Route Frequency    piperacillin-tazobactam (ZOSYN) 3.375 g in 0.9% sodium chloride (MBP/ADV) 100 mL MBP  3.375 g IntraVENous Q8H    vancomycin (VANCOCIN) 1,000 mg in 0.9% sodium chloride 250 mL (VIAL-MATE)  1,000 mg IntraVENous Q12H    cyanocobalamin tablet 1,000 mcg  1,000 mcg Oral DAILY    emtricitabine-tenofovir (TDF) (TRUVADA) 200-300 mg per tablet 1 Tab  1 Tab Oral DAILY    enoxaparin (LOVENOX) injection 40 mg  40 mg SubCUTAneous Q24H    pantoprazole (PROTONIX) tablet 40 mg  40 mg Oral ACB    predniSONE (DELTASONE) tablet 20 mg  20 mg Per G Tube DAILY    raltegravir (ISENTRESS) tablet 400 mg  400 mg Oral BID    VANCOMYCIN INFORMATION NOTE   Other Rx Dosing/Monitoring    thiamine mononitrate (B-1) tablet 50 mg  50 mg Oral DAILY    ziprasidone (GEODON) capsule 40 mg  40 mg Per G Tube BID WITH MEALS    acetaminophen (TYLENOL) suppository 650 mg  650 mg Rectal Q4H PRN    acetaminophen (TYLENOL) tablet 650 mg  650 mg Oral Q6H PRN         Assessment/Plan:     COVID-19 pneumonia  Acute  hypoxic respiratory failure resolved  HIV positive  History of transitional cell bladder cancer  Klebsiella pneumonia  Continue to wean oxygen per protocol he is right now he is on room air saturation is about 90%   patient was treated with Zosyn Lovenox prednisone Zithromax and  Actemra  Awaiting for placement

## 2020-09-20 LAB
DATE LAST DOSE: 0
REPORTED DOSE,DOSE: 0 UNITS
VANCOMYCIN TROUGH SERPL-MCNC: 19.5 UG/ML (ref 5–10)

## 2020-09-20 PROCEDURE — 74011250636 HC RX REV CODE- 250/636: Performed by: INTERNAL MEDICINE

## 2020-09-20 PROCEDURE — 74011250636 HC RX REV CODE- 250/636: Performed by: FAMILY MEDICINE

## 2020-09-20 PROCEDURE — 36415 COLL VENOUS BLD VENIPUNCTURE: CPT

## 2020-09-20 PROCEDURE — 74011636637 HC RX REV CODE- 636/637: Performed by: FAMILY MEDICINE

## 2020-09-20 PROCEDURE — 74011000258 HC RX REV CODE- 258: Performed by: INTERNAL MEDICINE

## 2020-09-20 PROCEDURE — 80202 ASSAY OF VANCOMYCIN: CPT

## 2020-09-20 PROCEDURE — 94760 N-INVAS EAR/PLS OXIMETRY 1: CPT

## 2020-09-20 PROCEDURE — 65270000029 HC RM PRIVATE

## 2020-09-20 PROCEDURE — 74011250637 HC RX REV CODE- 250/637: Performed by: FAMILY MEDICINE

## 2020-09-20 RX ADMIN — SODIUM CHLORIDE: 9 INJECTION, SOLUTION INTRAVENOUS at 04:52

## 2020-09-20 RX ADMIN — ZIPRASIDONE HCL 40 MG: 40 CAPSULE ORAL at 17:32

## 2020-09-20 RX ADMIN — SODIUM CHLORIDE 1000 MG: 9 INJECTION, SOLUTION INTRAVENOUS at 12:21

## 2020-09-20 RX ADMIN — PANTOPRAZOLE SODIUM 40 MG: 40 TABLET, DELAYED RELEASE ORAL at 08:16

## 2020-09-20 RX ADMIN — SODIUM CHLORIDE 1000 MG: 9 INJECTION, SOLUTION INTRAVENOUS at 21:30

## 2020-09-20 RX ADMIN — RALTEGRAVIR 400 MG: 400 TABLET, FILM COATED ORAL at 08:16

## 2020-09-20 RX ADMIN — PIPERACILLIN SODIUM AND TAZOBACTAM SODIUM 3.38 G: 3; .375 INJECTION, POWDER, LYOPHILIZED, FOR SOLUTION INTRAVENOUS at 15:22

## 2020-09-20 RX ADMIN — THIAMINE HCL TAB 100 MG 50 MG: 100 TAB at 08:17

## 2020-09-20 RX ADMIN — PIPERACILLIN SODIUM AND TAZOBACTAM SODIUM 3.38 G: 3; .375 INJECTION, POWDER, LYOPHILIZED, FOR SOLUTION INTRAVENOUS at 20:00

## 2020-09-20 RX ADMIN — EMTRICITABINE AND TENOFOVIR DISOPROXIL FUMARATE 1 TABLET: 200; 300 TABLET, FILM COATED ORAL at 09:00

## 2020-09-20 RX ADMIN — ENOXAPARIN SODIUM 40 MG: 40 INJECTION SUBCUTANEOUS at 08:17

## 2020-09-20 RX ADMIN — CYANOCOBALAMIN TAB 1000 MCG 1000 MCG: 1000 TAB at 08:16

## 2020-09-20 RX ADMIN — ZIPRASIDONE HCL 40 MG: 40 CAPSULE ORAL at 08:16

## 2020-09-20 RX ADMIN — PIPERACILLIN SODIUM AND TAZOBACTAM SODIUM 3.38 G: 3; .375 INJECTION, POWDER, LYOPHILIZED, FOR SOLUTION INTRAVENOUS at 21:15

## 2020-09-20 RX ADMIN — PIPERACILLIN SODIUM AND TAZOBACTAM SODIUM 3.38 G: 3; .375 INJECTION, POWDER, LYOPHILIZED, FOR SOLUTION INTRAVENOUS at 04:53

## 2020-09-20 RX ADMIN — PREDNISONE 20 MG: 20 TABLET ORAL at 08:16

## 2020-09-20 NOTE — ROUTINE PROCESS
Bedside and Verbal shift change report given to Srinivasa Kowalski (oncoming nurse) by Ginna Beebe (offgoing nurse). Report included the following information SBAR and MAR.

## 2020-09-21 LAB
GLUCOSE BLD STRIP.AUTO-MCNC: 107 MG/DL (ref 65–100)
GLUCOSE BLD STRIP.AUTO-MCNC: 109 MG/DL (ref 65–100)
PERFORMED BY, TECHID: ABNORMAL
PERFORMED BY, TECHID: ABNORMAL

## 2020-09-21 PROCEDURE — 74011250636 HC RX REV CODE- 250/636: Performed by: FAMILY MEDICINE

## 2020-09-21 PROCEDURE — 65270000029 HC RM PRIVATE

## 2020-09-21 PROCEDURE — 74011250637 HC RX REV CODE- 250/637: Performed by: FAMILY MEDICINE

## 2020-09-21 PROCEDURE — 74011000258 HC RX REV CODE- 258

## 2020-09-21 PROCEDURE — 94762 N-INVAS EAR/PLS OXIMTRY CONT: CPT

## 2020-09-21 PROCEDURE — 74011250636 HC RX REV CODE- 250/636: Performed by: INTERNAL MEDICINE

## 2020-09-21 PROCEDURE — 74011000258 HC RX REV CODE- 258: Performed by: INTERNAL MEDICINE

## 2020-09-21 PROCEDURE — 94760 N-INVAS EAR/PLS OXIMETRY 1: CPT

## 2020-09-21 PROCEDURE — 82962 GLUCOSE BLOOD TEST: CPT

## 2020-09-21 RX ORDER — PIPERACILLIN SODIUM, TAZOBACTAM SODIUM 3; .375 G/15ML; G/15ML
INJECTION, POWDER, LYOPHILIZED, FOR SOLUTION INTRAVENOUS
Status: DISPENSED
Start: 2020-09-21 | End: 2020-09-22

## 2020-09-21 RX ORDER — SODIUM CHLORIDE 900 MG/100ML
INJECTION INTRAVENOUS
Status: COMPLETED
Start: 2020-09-21 | End: 2020-09-21

## 2020-09-21 RX ADMIN — PIPERACILLIN SODIUM AND TAZOBACTAM SODIUM 3.38 G: 3; .375 INJECTION, POWDER, LYOPHILIZED, FOR SOLUTION INTRAVENOUS at 11:26

## 2020-09-21 RX ADMIN — SODIUM CHLORIDE 1000 MG: 9 INJECTION, SOLUTION INTRAVENOUS at 05:28

## 2020-09-21 RX ADMIN — SODIUM CHLORIDE 1000 MG: 9 INJECTION, SOLUTION INTRAVENOUS at 14:00

## 2020-09-21 RX ADMIN — SODIUM CHLORIDE 1000 MG: 9 INJECTION, SOLUTION INTRAVENOUS at 21:15

## 2020-09-21 RX ADMIN — PIPERACILLIN SODIUM AND TAZOBACTAM SODIUM 3.38 G: 3; .375 INJECTION, POWDER, LYOPHILIZED, FOR SOLUTION INTRAVENOUS at 20:25

## 2020-09-21 RX ADMIN — CYANOCOBALAMIN TAB 1000 MCG 1000 MCG: 1000 TAB at 11:26

## 2020-09-21 RX ADMIN — PANTOPRAZOLE SODIUM 40 MG: 40 TABLET, DELAYED RELEASE ORAL at 11:26

## 2020-09-21 RX ADMIN — SODIUM CHLORIDE: 900 INJECTION INTRAVENOUS at 21:00

## 2020-09-21 RX ADMIN — PIPERACILLIN SODIUM AND TAZOBACTAM SODIUM 3.38 G: 3; .375 INJECTION, POWDER, LYOPHILIZED, FOR SOLUTION INTRAVENOUS at 02:08

## 2020-09-21 RX ADMIN — ZIPRASIDONE HCL 40 MG: 40 CAPSULE ORAL at 17:00

## 2020-09-21 NOTE — PROGRESS NOTES
Vancomycin Note    Admission date 082920   Attending Gar Less   Indication  Pneumonia (COVID)       Height Ht Readings from Last 1 Encounters:   09/18/20 177.8 cm (70\")       Weight Wt Readings from Last 1 Encounters:   09/20/20 81.3 kg (179 lb 3.7 oz)      IBW Ideal body weight: 73 kg (160 lb 15 oz)    SCr Creatinine   Date Value Ref Range Status   09/15/2020 0.81 0.70 - 1.30 mg/dL Final   09/12/2020 0.78 0.70 - 1.30 mg/dL Final      CrCl (based on IBW) 91.4 ml/min       Load/ER dose    Current regimen 1000 mg IV q12H   Trough Scheduled for          Current Antimicrobial Therapy (168h ago, onward)      Ordered     Start Stop    09/19/20 1221  vancomycin (VANCOCIN) 1,000 mg in 0.9% sodium chloride 250 mL (VIAL-MATE)  1,000 mg,   IntraVENous,   EVERY 8 HOURS      09/19/20 1300 --    09/17/20 0348  piperacillin-tazobactam (ZOSYN) 3.375 g in 0.9% sodium chloride (MBP/ADV) 100 mL MBP  3.375 g,   IntraVENous,   EVERY 8 HOURS      09/17/20 1030 --    09/17/20 1314  doxycycline (MONODOX) 100 mg capsule  100 mg,   Oral,   2 TIMES DAILY      09/17/20 0000 --    09/13/20 2032  Vancomycin trough due 09/15/2020 @ 1600 (red top tube)  Other,   ONCE      09/15/20 1600 09/16/20 0359    09/11/20 1403  emtricitabine-tenofovir (TDF) (TRUVADA) 200-300 mg per tablet 1 Tab  1 Tab,   Oral,   DAILY      09/12/20 0900 --    09/11/20 1403  raltegravir (ISENTRESS) tablet 400 mg  400 mg,   Oral,   2 TIMES DAILY      09/12/20 0900 --    09/11/20 1403  VANCOMYCIN INFORMATION NOTE  Other,   RX DOSING/MONITORING      09/12/20 0500 --              Vancomycin,trough   Date Value Ref Range Status   09/20/2020 19.5 (H) 5.0 - 10.0 ug/mL Final     Comment:        Trough levels of 15-20 ug/mL should be targeted for patients with coagulase negative Staphylococcus and MRSA pneumonia, endocarditis,osteomyelitis, meningitis, and bacteremia, as well as patients not responding to lower levels.   Trough levels of 10-15 ug/mL for infections from other sources (e.g. urinary tract, cellulitis) are appropriate. All patients receiving concomitant nephrotoxic therapies should have their function closely monitored regardless of peak or trough levels. Continue with maintenance dose of:   Vancomycin 1000 mg q8h and schedule a trough level for 9/22 @ 2000. Plans for level(s): Pharmacy will follow daily and make changes as needed. Patient being treated for Pneumonia related to COVID.         Hermina Halsted, [address] [place] [address], Pharmacist  9/21/2020 9:21 AM      Submitted by: Hermina Halsted, [address] [place] [address]

## 2020-09-21 NOTE — ROUTINE PROCESS
Bedside and Verbal shift change report given to Anju Carranza (oncoming nurse) by Beulah Wu (offgoing nurse). Report included the following information SBAR and MAR.

## 2020-09-21 NOTE — PROGRESS NOTES
Pulmonary Progress Note    Subjective:   Daily Progress Note: 2020 1:58 PM    CC: From here arm diabetes with fever aspiration    HPI: 53-year-old male came in with shortness of breath, [place] he was COVID positive also is HIV positive    Patient in distress on oxygen nasal cannula mouth open but does not communicate  He is removing his oxygen nasal cannula  Now he is on room air saturation about 90%  Review of Systems  Unable to obtain    Objective:     Visit Vitals  /78   Pulse 73   Temp 97.4 °F (36.3 °C)   Resp 18   Ht 5' 10\" (1.778 m)   Wt 81.3 kg (179 lb 3.7 oz)   SpO2 94%   BMI 25.72 kg/m²    O2 Flow Rate (L/min): 4 l/min O2 Device: Room air    Temp (24hrs), Av.4 °F (36.3 °C), Min:97.4 °F (36.3 °C), Max:97.4 °F (36.3 °C)      No intake/output data recorded.  1901 -  0700  In: -   Out: 1000 [Urine:1000]    Visit Vitals  /78   Pulse 73   Temp 97.4 °F (36.3 °C)   Resp 18   Ht 5' 10\" (1.778 m)   Wt 81.3 kg (179 lb 3.7 oz)   SpO2 94%   BMI 25.72 kg/m²     General:  Alert, cooperative, no distress, appears stated age. Head:  Normocephalic, without obvious abnormality, atraumatic. Eyes:  Conjunctivae/corneas clear. PERRL, EOMs intact. Fundi benign   Ears:  Normal TMs and external ear canals both ears. Nose: Nares normal. Septum midline. Mucosa normal. No drainage or sinus tenderness. Throat: Lips, mucosa, and tongue normal. Teeth and gums normal.   Neck: Supple, symmetrical, trachea midline, no adenopathy, thyroid: no enlargement/tenderness/nodules, no carotid bruit and no JVD. Back:   Symmetric, no curvature. ROM normal. No CVA tenderness. Lungs:   Clear to auscultation bilaterally. Chest wall:  No tenderness or deformity. Heart:  Regular rate and rhythm, S1, S2 normal, no murmur, click, rub or gallop. Abdomen:   Soft, non-tender. Bowel sounds normal. No masses,  No organomegaly. Genitalia:  Normal male without lesion, discharge or tenderness.    Rectal: Normal tone, normal prostate, no masses or tenderness  Guaiac negative stool. Extremities: Extremities normal, atraumatic, no cyanosis or edema. Pulses: 2+ and symmetric all extremities.    Skin: Skin color, texture, turgor normal. No rashes or lesions   Lymph nodes: Cervical, supraclavicular, and axillary nodes normal.               Data Review    Recent Results (from the past 24 hour(s))   VANCOMYCIN, TROUGH    Collection Time: 09/20/20  9:36 PM   Result Value Ref Range    Vancomycin,trough 19.5 (H) 5.0 - 10.0 ug/mL    Reported dose date 0      Reported dose: 0 Units       Current Facility-Administered Medications   Medication Dose Route Frequency    vancomycin (VANCOCIN) 1,000 mg in 0.9% sodium chloride 250 mL (VIAL-MATE)  1,000 mg IntraVENous Q8H    piperacillin-tazobactam (ZOSYN) 3.375 g in 0.9% sodium chloride (MBP/ADV) 100 mL MBP  3.375 g IntraVENous Q8H    cyanocobalamin tablet 1,000 mcg  1,000 mcg Oral DAILY    emtricitabine-tenofovir (TDF) (TRUVADA) 200-300 mg per tablet 1 Tab  1 Tab Oral DAILY    enoxaparin (LOVENOX) injection 40 mg  40 mg SubCUTAneous Q24H    pantoprazole (PROTONIX) tablet 40 mg  40 mg Oral ACB    predniSONE (DELTASONE) tablet 20 mg  20 mg Per G Tube DAILY    raltegravir (ISENTRESS) tablet 400 mg  400 mg Oral BID    VANCOMYCIN INFORMATION NOTE   Other Rx Dosing/Monitoring    thiamine mononitrate (B-1) tablet 50 mg  50 mg Oral DAILY    ziprasidone (GEODON) capsule 40 mg  40 mg Per G Tube BID WITH MEALS    acetaminophen (TYLENOL) suppository 650 mg  650 mg Rectal Q4H PRN    acetaminophen (TYLENOL) tablet 650 mg  650 mg Oral Q6H PRN         Assessment/Plan:     COVID-19 pneumonia  Acute  hypoxic respiratory failure resolved  HIV positive  History of transitional cell bladder cancer  Klebsiella pneumonia  Continue to wean oxygen per protocol he is right now he is on room air saturation is about 90%   patient was treated with Zosyn Lovenox prednisone Zithromax and Actemra  Awaiting for placement

## 2020-09-21 NOTE — PROGRESS NOTES
Discharge Summary       PATIENT ID: Annita Mcdermott  MRN: [id number]   YOB: 1950    DATE OF ADMISSION: 8/29/2020  6:25 AM    DATE OF DISCHARGE:   PRIMARY CARE PROVIDER: Ariane Sanchez MD     ATTENDING PHYSICIAN: [address] [place] [address]  DISCHARGING PROVIDER: Cecy Villasenor      CONSULTATIONS: None    PROCEDURES/SURGERIES: * No surgery found *    ADMITTING DIAGNOSES:    Patient Active Problem List    Diagnosis Date Noted    SOB (shortness of breath) 09/12/2020       DISCHARGE DIAGNOSES / PLAN:    Acute hypoxemic respiratory failure on 4 L  Pneumonia with Klebsiella pneumonia Proteus mirabilis and MRSA on IV vancomycin and Zosyn  COVID-19 pneumonitis  COVID-19 not due to HIV  Severe dehydration  HIV disease  Hepatitis B  History of neurosyphilis  History of transitional cell bladder cancer  Chronic pain syndrome  Thrombocytopenia  Neutropenia  Hyponatremia  No sepsis     Active Hospital Problems    Diagnosis Date Noted    SOB (shortness of breath) 09/12/2020        ADDITIONAL CARE RECOMMENDATIONS:         DISCHARGE MEDICATIONS:  Current Discharge Medication List      START taking these medications    Details   !! ziprasidone (GEODON) 40 mg capsule 1 Cap by Per G Tube route two (2) times daily (with meals). Qty: 30 Cap, Refills: 0      predniSONE (DELTASONE) 20 mg tablet 20 mg by Per G Tube route daily. Indications: polyarteritis nodosa  Qty: 30 Tab, Refills: 0      thiamine mononitrate (B-1) 100 mg tablet Take 0.5 Tabs by mouth daily. Qty: 30 Tab, Refills: 0      doxycycline (MONODOX) 100 mg capsule Take 1 Cap by mouth two (2) times a day. Qty: 20 Cap, Refills: 0       !! - Potential duplicate medications found. Please discuss with provider. CONTINUE these medications which have NOT CHANGED    Details   albuterol (PROVENTIL VENTOLIN) 2.5 mg /3 mL (0.083 %) nebu 2.5 mg by Nebulization route every two (2) hours as needed for Wheezing or Shortness of Breath.       cyanocobalamin 1,000 mcg tablet Take 1,000 mcg by mouth daily. emtricitabine-tenofovir, TDF, (TRUVADA) 200-300 mg per tablet Take 1 Tab by mouth daily. lansoprazole (PREVACID) 30 mg capsule 30 mg by Per G Tube route Daily (before breakfast). raltegravir (ISENTRESS) 400 mg tablet Take 400 mg by mouth two (2) times a day. thiamine (B-1) 50 mg tablet Take 50 mg by mouth daily. !! ziprasidone (GEODON) 40 mg capsule 40 mg by Per G Tube route two (2) times daily (with meals). !! - Potential duplicate medications found. Please discuss with provider. STOP taking these medications       acetaminophen (TYLENOL) 325 mg tablet Comments:   Reason for Stopping:         albuterol sulfate (PROVENTIL;VENTOLIN) 2.5 mg/0.5 mL nebu nebulizer solution Comments:   Reason for Stopping:         fentaNYL (DURAGESIC) 25 mcg/hr PATCH Comments:   Reason for Stopping:                 NOTIFY YOUR PHYSICIAN FOR ANY OF THE FOLLOWING:   Fever over 101 degrees for 24 hours. Chest pain, shortness of breath, fever, chills, nausea, vomiting, diarrhea, change in mentation, falling, weakness, bleeding. Severe pain or pain not relieved by medications. Or, any other signs or symptoms that you may have questions about. DISPOSITION:  x  Home With:   OT  PT  HH  RN       Long term SNF/Inpatient Rehab    Independent/assisted living    Hospice    Other:       PATIENT CONDITION AT DISCHARGE: Stable      PHYSICAL EXAMINATION AT DISCHARGE:  General:          Alert, cooperative, no distress, appears stated age. HEENT:           Atraumatic, anicteric sclerae, pink conjunctivae                          No oral ulcers, mucosa moist, throat clear, dentition fair  Neck:               Supple, symmetrical  Lungs:             Clear to auscultation bilaterally. No Wheezing or Rhonchi. No rales. Chest wall:      No tenderness  No Accessory muscle use. Heart:              Regular  rhythm,  No  murmur   No edema  Abdomen:        Soft, non-tender. Not distended.   Bowel sounds normal  Extremities:     No cyanosis. No clubbing,                            Skin turgor normal, Capillary refill normal  Skin:                Not pale. Not Jaundiced  No rashes   Psych:             Not anxious or agitated.   Neurologic:      Alert, moves all extremities, answers questions appropriately and responds to commands     No orders to display        Recent Results (from the past 24 hour(s))   VANCOMYCIN, TROUGH    Collection Time: 09/20/20  9:36 PM   Result Value Ref Range    Vancomycin,trough 19.5 (H) 5.0 - 10.0 ug/mL    Reported dose date 0      Reported dose: 0 Units          HOSPITAL COURSE:  To present illness please refer to history and physical at the time of the admission as the patient was admitted because of acute hypoxemic respiratory failure pneumonia with Klebsiella pneumonia and MRSA patient was IV treated with IV vancomycin and Zosyn COVID-19 was positive seen by the pulmonologist and also seen by the nephrologist patient was hypoxemic for a while now patient was weaned off on room air saturation above 90% patient bedbound nonverbal active failure patient received course of IV antibiotic will discharge back to the nursing home on p.o. doxycycline      Patient resting the bed not any distress waiting for placement  We will repeat the labs in the morning  Signed:   Charlene Grimm MD  9/21/2020  1:09 PM

## 2020-09-22 LAB
ALBUMIN SERPL-MCNC: 3.2 G/DL (ref 3.5–5)
ALBUMIN/GLOB SERPL: 0.8 {RATIO} (ref 1.1–2.2)
ALP SERPL-CCNC: 86 U/L (ref 45–117)
ALT SERPL-CCNC: 27 U/L (ref 12–78)
ANION GAP SERPL CALC-SCNC: 7 MMOL/L (ref 5–15)
AST SERPL W P-5'-P-CCNC: 27 U/L (ref 15–37)
BASOPHILS # BLD: 0 K/UL (ref 0–0.1)
BASOPHILS NFR BLD: 1 % (ref 0–1)
BILIRUB SERPL-MCNC: 0.8 MG/DL (ref 0.2–1)
BUN SERPL-MCNC: 12 MG/DL (ref 6–20)
BUN/CREAT SERPL: 11 (ref 12–20)
CA-I BLD-MCNC: 9.2 MG/DL (ref 8.5–10.1)
CHLORIDE SERPL-SCNC: 115 MMOL/L (ref 97–108)
CO2 SERPL-SCNC: 25 MMOL/L (ref 21–32)
CREAT SERPL-MCNC: 1.06 MG/DL (ref 0.7–1.3)
DATE LAST DOSE: 0
DIFFERENTIAL METHOD BLD: ABNORMAL
EOSINOPHIL # BLD: 0.2 K/UL (ref 0–0.4)
EOSINOPHIL NFR BLD: 6 % (ref 0–7)
ERYTHROCYTE [DISTWIDTH] IN BLOOD BY AUTOMATED COUNT: 14.8 % (ref 11.5–14.5)
GLOBULIN SER CALC-MCNC: 3.8 G/DL (ref 2–4)
GLUCOSE SERPL-MCNC: 122 MG/DL (ref 65–100)
HCT VFR BLD AUTO: 41.5 % (ref 36.6–50.3)
HGB BLD-MCNC: 14 % (ref 12.1–17)
IMM GRANULOCYTES # BLD AUTO: 0 K/UL (ref 0–0.04)
IMM GRANULOCYTES NFR BLD AUTO: 0 % (ref 0–0.5)
LYMPHOCYTES # BLD: 1.4 K/UL (ref 0.8–3.5)
LYMPHOCYTES NFR BLD: 35 % (ref 12–49)
MCH RBC QN AUTO: 32 PG (ref 26–34)
MCHC RBC AUTO-ENTMCNC: 33.7 G/DL (ref 30–36.5)
MCV RBC AUTO: 94.7 FL (ref 80–99)
MONOCYTES # BLD: 0.2 K/UL (ref 0–1)
MONOCYTES NFR BLD: 6 % (ref 5–13)
NEUTS SEG # BLD: 2 K/UL (ref 1.8–8)
NEUTS SEG NFR BLD: 52 % (ref 32–75)
PLATELET # BLD AUTO: 78 K/UL (ref 150–400)
PMV BLD AUTO: 11.8 FL (ref 8.9–12.9)
POTASSIUM SERPL-SCNC: 3.5 MMOL/L (ref 3.5–5.1)
PROT SERPL-MCNC: 7 G/DL (ref 6.4–8.2)
RBC # BLD AUTO: 4.38 M/UL (ref 4.1–5.7)
REPORTED DOSE,DOSE: 0 UNITS
SARS-COV-2, COV2NT: NOT DETECTED
SODIUM SERPL-SCNC: 147 MMOL/L (ref 136–145)
VANCOMYCIN TROUGH SERPL-MCNC: 23.9 UG/ML (ref 5–10)
WBC # BLD AUTO: 3.9 K/UL (ref 4.1–11.1)

## 2020-09-22 PROCEDURE — 74011000258 HC RX REV CODE- 258: Performed by: INTERNAL MEDICINE

## 2020-09-22 PROCEDURE — 74011250636 HC RX REV CODE- 250/636: Performed by: FAMILY MEDICINE

## 2020-09-22 PROCEDURE — 74011250637 HC RX REV CODE- 250/637: Performed by: FAMILY MEDICINE

## 2020-09-22 PROCEDURE — 74011000258 HC RX REV CODE- 258

## 2020-09-22 PROCEDURE — 85025 COMPLETE CBC W/AUTO DIFF WBC: CPT

## 2020-09-22 PROCEDURE — 74011636637 HC RX REV CODE- 636/637: Performed by: FAMILY MEDICINE

## 2020-09-22 PROCEDURE — 94762 N-INVAS EAR/PLS OXIMTRY CONT: CPT

## 2020-09-22 PROCEDURE — 74011250636 HC RX REV CODE- 250/636: Performed by: INTERNAL MEDICINE

## 2020-09-22 PROCEDURE — 65270000029 HC RM PRIVATE

## 2020-09-22 PROCEDURE — 80202 ASSAY OF VANCOMYCIN: CPT

## 2020-09-22 PROCEDURE — 74011250636 HC RX REV CODE- 250/636

## 2020-09-22 PROCEDURE — 36415 COLL VENOUS BLD VENIPUNCTURE: CPT

## 2020-09-22 PROCEDURE — 74011000258 HC RX REV CODE- 258: Performed by: FAMILY MEDICINE

## 2020-09-22 PROCEDURE — 80053 COMPREHEN METABOLIC PANEL: CPT

## 2020-09-22 RX ORDER — SODIUM CHLORIDE 900 MG/100ML
INJECTION INTRAVENOUS
Status: COMPLETED
Start: 2020-09-22 | End: 2020-09-22

## 2020-09-22 RX ORDER — PIPERACILLIN SODIUM, TAZOBACTAM SODIUM 3; .375 G/15ML; G/15ML
INJECTION, POWDER, LYOPHILIZED, FOR SOLUTION INTRAVENOUS
Status: DISPENSED
Start: 2020-09-22 | End: 2020-09-23

## 2020-09-22 RX ORDER — PIPERACILLIN SODIUM, TAZOBACTAM SODIUM 3; .375 G/15ML; G/15ML
INJECTION, POWDER, LYOPHILIZED, FOR SOLUTION INTRAVENOUS
Status: COMPLETED
Start: 2020-09-22 | End: 2020-09-22

## 2020-09-22 RX ADMIN — ZIPRASIDONE HCL 40 MG: 40 CAPSULE ORAL at 09:28

## 2020-09-22 RX ADMIN — SODIUM CHLORIDE 1000 MG: 9 INJECTION, SOLUTION INTRAVENOUS at 14:34

## 2020-09-22 RX ADMIN — PIPERACILLIN SODIUM AND TAZOBACTAM SODIUM 3.38 G: 3; .375 INJECTION, POWDER, LYOPHILIZED, FOR SOLUTION INTRAVENOUS at 20:27

## 2020-09-22 RX ADMIN — PANTOPRAZOLE SODIUM 40 MG: 40 TABLET, DELAYED RELEASE ORAL at 06:43

## 2020-09-22 RX ADMIN — SODIUM CHLORIDE 1000 MG: 9 INJECTION, SOLUTION INTRAVENOUS at 04:51

## 2020-09-22 RX ADMIN — PREDNISONE 20 MG: 20 TABLET ORAL at 09:28

## 2020-09-22 RX ADMIN — PIPERACILLIN SODIUM AND TAZOBACTAM SODIUM 3.38 G: 3; .375 INJECTION, POWDER, LYOPHILIZED, FOR SOLUTION INTRAVENOUS at 12:16

## 2020-09-22 RX ADMIN — SODIUM CHLORIDE 1000 MG: 9 INJECTION, SOLUTION INTRAVENOUS at 22:38

## 2020-09-22 RX ADMIN — ZIPRASIDONE HCL 40 MG: 40 CAPSULE ORAL at 17:16

## 2020-09-22 RX ADMIN — RALTEGRAVIR 400 MG: 400 TABLET, FILM COATED ORAL at 14:00

## 2020-09-22 RX ADMIN — PIPERACILLIN SODIUM AND TAZOBACTAM SODIUM 3.38 G: 3; .375 INJECTION, POWDER, LYOPHILIZED, FOR SOLUTION INTRAVENOUS at 04:50

## 2020-09-22 RX ADMIN — SODIUM CHLORIDE: 900 INJECTION INTRAVENOUS at 21:00

## 2020-09-22 RX ADMIN — THIAMINE HCL TAB 100 MG 50 MG: 100 TAB at 09:27

## 2020-09-22 RX ADMIN — PIPERACILLIN SODIUM AND TAZOBACTAM SODIUM: 3; .375 INJECTION, POWDER, LYOPHILIZED, FOR SOLUTION INTRAVENOUS at 05:00

## 2020-09-22 RX ADMIN — CYANOCOBALAMIN TAB 1000 MCG 1000 MCG: 1000 TAB at 09:25

## 2020-09-22 RX ADMIN — ENOXAPARIN SODIUM 40 MG: 40 INJECTION SUBCUTANEOUS at 09:28

## 2020-09-22 NOTE — PROGRESS NOTES
Pt resting in bed hob up mouth breather. resp even nonlabored. Right arm midline. Right hand with mitten peg tube in place. Condom catheter noted.

## 2020-09-22 NOTE — PROGRESS NOTES
Pulmonary Progress Note    Subjective:   Daily Progress Note: 2020 1:58 PM    CC: From here arm diabetes with fever aspiration    HPI: 65-year-old male came in with shortness of breath, [employer] he was COVID positive also is HIV positive    Patient in distress on oxygen nasal cannula mouth open but does not communicate  He is removing his oxygen nasal cannula  Now he is on room air saturation about 90%  Review of Systems  Unable to obtain    Objective:     Visit Vitals  /81 (BP 1 Location: Left arm, BP Patient Position: At rest)   Pulse 80   Temp 97.8 °F (36.6 °C)   Resp 18   Ht 5' 10\" (1.778 m)   Wt 81.5 kg (179 lb 10.8 oz)   SpO2 97%   BMI 25.78 kg/m²    O2 Flow Rate (L/min): 4 l/min O2 Device: Room air    Temp (24hrs), Av.8 °F (36.6 °C), Min:96.9 °F (36.1 °C), Max:98.3 °F (36.8 °C)       07 -  1900  In: -   Out: 1 [Urine:1]  No intake/output data recorded. Visit Vitals  /81 (BP 1 Location: Left arm, BP Patient Position: At rest)   Pulse 80   Temp 97.8 °F (36.6 °C)   Resp 18   Ht 5' 10\" (1.778 m)   Wt 81.5 kg (179 lb 10.8 oz)   SpO2 97%   BMI 25.78 kg/m²     General:  Alert, cooperative, no distress, appears stated age. Head:  Normocephalic, without obvious abnormality, atraumatic. Eyes:  Conjunctivae/corneas clear. PERRL, EOMs intact. Fundi benign   Ears:  Normal TMs and external ear canals both ears. Nose: Nares normal. Septum midline. Mucosa normal. No drainage or sinus tenderness. Throat: Lips, mucosa, and tongue normal. Teeth and gums normal.   Neck: Supple, symmetrical, trachea midline, no adenopathy, thyroid: no enlargement/tenderness/nodules, no carotid bruit and no JVD. Back:   Symmetric, no curvature. ROM normal. No CVA tenderness. Lungs:   Clear to auscultation bilaterally. Chest wall:  No tenderness or deformity. Heart:  Regular rate and rhythm, S1, S2 normal, no murmur, click, rub or gallop. Abdomen:   Soft, non-tender.  Bowel sounds normal. No masses,  No organomegaly. Genitalia:  Normal male without lesion, discharge or tenderness. Rectal:  Normal tone, normal prostate, no masses or tenderness  Guaiac negative stool. Extremities: Extremities normal, atraumatic, no cyanosis or edema. Pulses: 2+ and symmetric all extremities. Skin: Skin color, texture, turgor normal. No rashes or lesions   Lymph nodes: Cervical, supraclavicular, and axillary nodes normal.               Data Review    Recent Results (from the past 24 hour(s))   CBC WITH AUTOMATED DIFF    Collection Time: 09/22/20  7:31 AM   Result Value Ref Range    WBC 3.9 (L) 4.1 - 11.1 K/uL    RBC 4.38 4.10 - 5.70 M/uL    HGB 14.0 12.1 - 17.0 %    HCT 41.5 36.6 - 50.3 %    MCV 94.7 80.0 - 99.0 FL    MCH 32.0 26.0 - 34.0 PG    MCHC 33.7 30.0 - 36.5 g/dL    RDW 14.8 (H) 11.5 - 14.5 %    PLATELET 78 (L) 409 - 400 K/uL    MPV 11.8 8.9 - 12.9 FL    NEUTROPHILS 52 32 - 75 %    LYMPHOCYTES 35 12 - 49 %    MONOCYTES 6 5 - 13 %    EOSINOPHILS 6 0 - 7 %    BASOPHILS 1 0 - 1 %    IMMATURE GRANULOCYTES 0 0.0 - 0.5 %    ABS. NEUTROPHILS 2.0 1.8 - 8.0 K/UL    ABS. LYMPHOCYTES 1.4 0.8 - 3.5 K/UL    ABS. MONOCYTES 0.2 0.0 - 1.0 K/UL    ABS. EOSINOPHILS 0.2 0.0 - 0.4 K/UL    ABS. BASOPHILS 0.0 0.0 - 0.1 K/UL    ABS. IMM. GRANS. 0.0 0.00 - 0.04 K/UL    DF AUTOMATED     METABOLIC PANEL, COMPREHENSIVE    Collection Time: 09/22/20  7:31 AM   Result Value Ref Range    Sodium 147 (H) 136 - 145 mmol/L    Potassium 3.5 3.5 - 5.1 mmol/L    Chloride 115 (H) 97 - 108 mmol/L    CO2 25 21 - 32 mmol/L    Anion gap 7 5 - 15 mmol/L    Glucose 122 (H) 65 - 100 mg/dL    BUN 12 6 - 20 mg/dL    Creatinine 1.06 0.70 - 1.30 mg/dL    BUN/Creatinine ratio 11 (L) 12 - 20      GFR est AA >60 >60 ml/min/1.73m2    GFR est non-AA >60 >60 ml/min/1.73m2    Calcium 9.2 8.5 - 10.1 mg/dL    Bilirubin, total 0.8 0.2 - 1.0 mg/dL    AST (SGOT) 27 15 - 37 U/L    ALT (SGPT) 27 12 - 78 U/L    Alk.  phosphatase 86 45 - 117 U/L    Protein, total 7.0 6.4 - 8.2 g/dL    Albumin 3.2 (L) 3.5 - 5.0 g/dL    Globulin 3.8 2.0 - 4.0 g/dL    A-G Ratio 0.8 (L) 1.1 - 2.2         Current Facility-Administered Medications   Medication Dose Route Frequency    vancomycin (VANCOCIN) 1,000 mg in 0.9% sodium chloride 250 mL (VIAL-MATE)  1,000 mg IntraVENous Q8H    piperacillin-tazobactam (ZOSYN) 3.375 g in 0.9% sodium chloride (MBP/ADV) 100 mL MBP  3.375 g IntraVENous Q8H    cyanocobalamin tablet 1,000 mcg  1,000 mcg Oral DAILY    emtricitabine-tenofovir (TDF) (TRUVADA) 200-300 mg per tablet 1 Tab  1 Tab Oral DAILY    enoxaparin (LOVENOX) injection 40 mg  40 mg SubCUTAneous Q24H    pantoprazole (PROTONIX) tablet 40 mg  40 mg Oral ACB    predniSONE (DELTASONE) tablet 20 mg  20 mg Per G Tube DAILY    raltegravir (ISENTRESS) tablet 400 mg  400 mg Oral BID    VANCOMYCIN INFORMATION NOTE   Other Rx Dosing/Monitoring    thiamine mononitrate (B-1) tablet 50 mg  50 mg Oral DAILY    ziprasidone (GEODON) capsule 40 mg  40 mg Per G Tube BID WITH MEALS    acetaminophen (TYLENOL) suppository 650 mg  650 mg Rectal Q4H PRN    acetaminophen (TYLENOL) tablet 650 mg  650 mg Oral Q6H PRN         Assessment/Plan:     COVID-19 pneumonia  Acute  hypoxic respiratory failure resolved  HIV positive  History of transitional cell bladder cancer  Klebsiella pneumonia  Continue to wean oxygen per protocol he is right now he is on room air saturation is about 90%   patient was treated with Zosyn Lovenox prednisone Zithromax and  Actemra  Awaiting for placement

## 2020-09-22 NOTE — DISCHARGE SUMMARY
Discharge Summary       PATIENT ID: Marixa Correa  MRN: [id number]   YOB: 1950    DATE OF ADMISSION: 8/29/2020  6:25 AM    DATE OF DISCHARGE:   PRIMARY CARE PROVIDER: Connie Grijalva MD     ATTENDING PHYSICIAN: Zion eMtz  DISCHARGING PROVIDER: Feliciano Villasenor      CONSULTATIONS: None    PROCEDURES/SURGERIES: * No surgery found *    ADMITTING DIAGNOSES:    Patient Active Problem List    Diagnosis Date Noted    SOB (shortness of breath) 09/12/2020       DISCHARGE DIAGNOSES / PLAN:    Acute hypoxemic respiratory failure on 4 L  Pneumonia with Klebsiella pneumonia Proteus mirabilis and MRSA on IV vancomycin and Zosyn  COVID-19 pneumonitis  COVID-19 not due to HIV  Severe dehydration  HIV disease  Hepatitis B  History of neurosyphilis  History of transitional cell bladder cancer  Chronic pain syndrome  Thrombocytopenia  Neutropenia  Hyponatremia  No sepsis     Active Hospital Problems    Diagnosis Date Noted    SOB (shortness of breath) 09/12/2020        ADDITIONAL CARE RECOMMENDATIONS:         DISCHARGE MEDICATIONS:  Current Discharge Medication List      START taking these medications    Details   !! ziprasidone (GEODON) 40 mg capsule 1 Cap by Per G Tube route two (2) times daily (with meals). Qty: 30 Cap, Refills: 0      predniSONE (DELTASONE) 20 mg tablet 20 mg by Per G Tube route daily. Indications: polyarteritis nodosa  Qty: 30 Tab, Refills: 0      thiamine mononitrate (B-1) 100 mg tablet Take 0.5 Tabs by mouth daily. Qty: 30 Tab, Refills: 0      doxycycline (MONODOX) 100 mg capsule Take 1 Cap by mouth two (2) times a day. Qty: 20 Cap, Refills: 0       !! - Potential duplicate medications found. Please discuss with provider. CONTINUE these medications which have NOT CHANGED    Details   albuterol (PROVENTIL VENTOLIN) 2.5 mg /3 mL (0.083 %) nebu 2.5 mg by Nebulization route every two (2) hours as needed for Wheezing or Shortness of Breath.       cyanocobalamin 1,000 mcg tablet Take 1,000 mcg by mouth daily. emtricitabine-tenofovir, TDF, (TRUVADA) 200-300 mg per tablet Take 1 Tab by mouth daily. lansoprazole (PREVACID) 30 mg capsule 30 mg by Per G Tube route Daily (before breakfast). raltegravir (ISENTRESS) 400 mg tablet Take 400 mg by mouth two (2) times a day. thiamine (B-1) 50 mg tablet Take 50 mg by mouth daily. !! ziprasidone (GEODON) 40 mg capsule 40 mg by Per G Tube route two (2) times daily (with meals). !! - Potential duplicate medications found. Please discuss with provider. STOP taking these medications       acetaminophen (TYLENOL) 325 mg tablet Comments:   Reason for Stopping:         albuterol sulfate (PROVENTIL;VENTOLIN) 2.5 mg/0.5 mL nebu nebulizer solution Comments:   Reason for Stopping:         fentaNYL (DURAGESIC) 25 mcg/hr PATCH Comments:   Reason for Stopping:                 NOTIFY YOUR PHYSICIAN FOR ANY OF THE FOLLOWING:   Fever over 101 degrees for 24 hours. Chest pain, shortness of breath, fever, chills, nausea, vomiting, diarrhea, change in mentation, falling, weakness, bleeding. Severe pain or pain not relieved by medications. Or, any other signs or symptoms that you may have questions about. DISPOSITION:  x  Home With:   OT  PT  HH  RN       Long term SNF/Inpatient Rehab    Independent/assisted living    Hospice    Other:       PATIENT CONDITION AT DISCHARGE: Stable      PHYSICAL EXAMINATION AT DISCHARGE:  General:          Alert, cooperative, no distress, appears stated age. HEENT:           Atraumatic, anicteric sclerae, pink conjunctivae                          No oral ulcers, mucosa moist, throat clear, dentition fair  Neck:               Supple, symmetrical  Lungs:             Clear to auscultation bilaterally. No Wheezing or Rhonchi. No rales. Chest wall:      No tenderness  No Accessory muscle use. Heart:              Regular  rhythm,  No  murmur   No edema  Abdomen:        Soft, non-tender. Not distended.   Bowel sounds normal  Extremities:     No cyanosis. No clubbing,                            Skin turgor normal, Capillary refill normal  Skin:                Not pale. Not Jaundiced  No rashes   Psych:             Not anxious or agitated. Neurologic:      Alert, moves all extremities, answers questions appropriately and responds to commands     No orders to display        Recent Results (from the past 24 hour(s))   GLUCOSE, POC    Collection Time: 09/21/20  4:49 PM   Result Value Ref Range    Glucose (POC) 107 (H) 65 - 100 mg/dL    Performed by Coni Hatchet    CBC WITH AUTOMATED DIFF    Collection Time: 09/22/20  7:31 AM   Result Value Ref Range    WBC 3.9 (L) 4.1 - 11.1 K/uL    RBC 4.38 4.10 - 5.70 M/uL    HGB 14.0 12.1 - 17.0 %    HCT 41.5 36.6 - 50.3 %    MCV 94.7 80.0 - 99.0 FL    MCH 32.0 26.0 - 34.0 PG    MCHC 33.7 30.0 - 36.5 g/dL    RDW 14.8 (H) 11.5 - 14.5 %    PLATELET 78 (L) 810 - 400 K/uL    MPV 11.8 8.9 - 12.9 FL    NEUTROPHILS 52 32 - 75 %    LYMPHOCYTES 35 12 - 49 %    MONOCYTES 6 5 - 13 %    EOSINOPHILS 6 0 - 7 %    BASOPHILS 1 0 - 1 %    IMMATURE GRANULOCYTES 0 0.0 - 0.5 %    ABS. NEUTROPHILS 2.0 1.8 - 8.0 K/UL    ABS. LYMPHOCYTES 1.4 0.8 - 3.5 K/UL    ABS. MONOCYTES 0.2 0.0 - 1.0 K/UL    ABS. EOSINOPHILS 0.2 0.0 - 0.4 K/UL    ABS. BASOPHILS 0.0 0.0 - 0.1 K/UL    ABS. IMM.  GRANS. 0.0 0.00 - 0.04 K/UL    DF AUTOMATED     METABOLIC PANEL, COMPREHENSIVE    Collection Time: 09/22/20  7:31 AM   Result Value Ref Range    Sodium 147 (H) 136 - 145 mmol/L    Potassium 3.5 3.5 - 5.1 mmol/L    Chloride 115 (H) 97 - 108 mmol/L    CO2 25 21 - 32 mmol/L    Anion gap 7 5 - 15 mmol/L    Glucose 122 (H) 65 - 100 mg/dL    BUN 12 6 - 20 mg/dL    Creatinine 1.06 0.70 - 1.30 mg/dL    BUN/Creatinine ratio 11 (L) 12 - 20      GFR est AA >60 >60 ml/min/1.73m2    GFR est non-AA >60 >60 ml/min/1.73m2    Calcium 9.2 8.5 - 10.1 mg/dL    Bilirubin, total 0.8 0.2 - 1.0 mg/dL    AST (SGOT) 27 15 - 37 U/L    ALT (SGPT) 27 12 - 78 U/L    Alk.  phosphatase 86 45 - 117 U/L    Protein, total 7.0 6.4 - 8.2 g/dL    Albumin 3.2 (L) 3.5 - 5.0 g/dL    Globulin 3.8 2.0 - 4.0 g/dL    A-G Ratio 0.8 (L) 1.1 - 2.2            HOSPITAL COURSE:  To present illness please refer to history and physical at the time of the admission as the patient was admitted because of acute hypoxemic respiratory failure pneumonia with Klebsiella pneumonia and MRSA patient was IV treated with IV vancomycin and Zosyn COVID-19 was positive seen by the pulmonologist and also seen by the nephrologist patient was hypoxemic for a while now patient was weaned off on room air saturation above 90% patient bedbound nonverbal active failure patient received course of IV antibiotic will discharge back to the nursing home on p.o. doxycycline      Patient resting the bed not any distress waiting for placement  We will repeat the labs in the morning  Signed:   Bigg Patterson MD  9/22/2020  1:09 PM

## 2020-09-23 PROCEDURE — 94762 N-INVAS EAR/PLS OXIMTRY CONT: CPT

## 2020-09-23 PROCEDURE — 74011636637 HC RX REV CODE- 636/637: Performed by: FAMILY MEDICINE

## 2020-09-23 PROCEDURE — 65270000029 HC RM PRIVATE

## 2020-09-23 PROCEDURE — 74011250637 HC RX REV CODE- 250/637: Performed by: FAMILY MEDICINE

## 2020-09-23 PROCEDURE — 74011250636 HC RX REV CODE- 250/636: Performed by: FAMILY MEDICINE

## 2020-09-23 PROCEDURE — 74011250636 HC RX REV CODE- 250/636: Performed by: INTERNAL MEDICINE

## 2020-09-23 PROCEDURE — 74011000258 HC RX REV CODE- 258: Performed by: INTERNAL MEDICINE

## 2020-09-23 RX ADMIN — ENOXAPARIN SODIUM 40 MG: 40 INJECTION SUBCUTANEOUS at 08:22

## 2020-09-23 RX ADMIN — ZIPRASIDONE HCL 40 MG: 40 CAPSULE ORAL at 17:25

## 2020-09-23 RX ADMIN — PANTOPRAZOLE SODIUM 40 MG: 40 TABLET, DELAYED RELEASE ORAL at 08:21

## 2020-09-23 RX ADMIN — THIAMINE HCL TAB 100 MG 50 MG: 100 TAB at 08:21

## 2020-09-23 RX ADMIN — SODIUM CHLORIDE 1000 MG: 9 INJECTION, SOLUTION INTRAVENOUS at 11:31

## 2020-09-23 RX ADMIN — ZIPRASIDONE HCL 40 MG: 40 CAPSULE ORAL at 08:21

## 2020-09-23 RX ADMIN — CYANOCOBALAMIN TAB 1000 MCG 1000 MCG: 1000 TAB at 08:22

## 2020-09-23 RX ADMIN — PIPERACILLIN SODIUM AND TAZOBACTAM SODIUM 3.38 G: 3; .375 INJECTION, POWDER, LYOPHILIZED, FOR SOLUTION INTRAVENOUS at 03:00

## 2020-09-23 RX ADMIN — PIPERACILLIN SODIUM AND TAZOBACTAM SODIUM 3.38 G: 3; .375 INJECTION, POWDER, LYOPHILIZED, FOR SOLUTION INTRAVENOUS at 19:31

## 2020-09-23 RX ADMIN — PREDNISONE 20 MG: 20 TABLET ORAL at 08:21

## 2020-09-23 RX ADMIN — RALTEGRAVIR 400 MG: 400 TABLET, FILM COATED ORAL at 17:25

## 2020-09-23 RX ADMIN — PIPERACILLIN SODIUM AND TAZOBACTAM SODIUM 3.38 G: 3; .375 INJECTION, POWDER, LYOPHILIZED, FOR SOLUTION INTRAVENOUS at 14:32

## 2020-09-23 RX ADMIN — RALTEGRAVIR 400 MG: 400 TABLET, FILM COATED ORAL at 08:22

## 2020-09-23 NOTE — PROGRESS NOTES
Vancomycin level was 23.9. Discontinued vancomycin 1000 mg iv q8h. Stated pt on vancomycin 1000 mg q12h to be started on 9/23/2020 at 1200. A trough level was entered on 9/25/2020 at 1000 am. Will follow pt accordingly.    Ricci SILVA

## 2020-09-23 NOTE — PROGRESS NOTES
Pt resting in bed hob up mouth breather. Non-verbal.  Non-ambulatory. resp even nonlabored. Right arm midline. Right hand with mitten peg tube in place. Condom catheter noted.  Bed in low position side rails up call light in reach

## 2020-09-23 NOTE — PROGRESS NOTES
Pulmonary Progress Note    Subjective:   Daily Progress Note: 2020 1:58 PM    CC: From here arm diabetes with fever aspiration    HPI: 82-year-old male came in with shortness of breath, [employer] he was COVID positive also is HIV positive    Patient in distress on oxygen nasal cannula mouth open but does not communicate  He is removing his oxygen nasal cannula  Now he is on room air saturation about 90%  Review of Systems  Unable to obtain    Objective:     Visit Vitals  /74   Pulse 77   Temp 97.8 °F (36.6 °C)   Resp 18   Ht 5' 10\" (1.778 m)   Wt 81.8 kg (180 lb 5.4 oz)   SpO2 95%   BMI 25.88 kg/m²    O2 Flow Rate (L/min): 4 l/min O2 Device: Room air    Temp (24hrs), Av °F (36.7 °C), Min:96.9 °F (36.1 °C), Max:98.7 °F (37.1 °C)       07 - 1900  In: -   Out: 250 [Urine:250]  1901 -  0700  In: 1687 [I.V.:250]  Out: 1 [Urine:1]    Visit Vitals  /74   Pulse 77   Temp 97.8 °F (36.6 °C)   Resp 18   Ht 5' 10\" (1.778 m)   Wt 81.8 kg (180 lb 5.4 oz)   SpO2 95%   BMI 25.88 kg/m²     General:  Alert, cooperative, no distress, appears stated age. Head:  Normocephalic, without obvious abnormality, atraumatic. Eyes:  Conjunctivae/corneas clear. PERRL, EOMs intact. Fundi benign   Ears:  Normal TMs and external ear canals both ears. Nose: Nares normal. Septum midline. Mucosa normal. No drainage or sinus tenderness. Throat: Lips, mucosa, and tongue normal. Teeth and gums normal.   Neck: Supple, symmetrical, trachea midline, no adenopathy, thyroid: no enlargement/tenderness/nodules, no carotid bruit and no JVD. Back:   Symmetric, no curvature. ROM normal. No CVA tenderness. Lungs:   Clear to auscultation bilaterally. Chest wall:  No tenderness or deformity. Heart:  Regular rate and rhythm, S1, S2 normal, no murmur, click, rub or gallop. Abdomen:   Soft, non-tender. Bowel sounds normal. No masses,  No organomegaly.    Genitalia:  Normal male without lesion, discharge or tenderness. Rectal:  Normal tone, normal prostate, no masses or tenderness  Guaiac negative stool. Extremities: Extremities normal, atraumatic, no cyanosis or edema. Pulses: 2+ and symmetric all extremities.    Skin: Skin color, texture, turgor normal. No rashes or lesions   Lymph nodes: Cervical, supraclavicular, and axillary nodes normal.               Data Review    Recent Results (from the past 24 hour(s))   VANCOMYCIN, TROUGH    Collection Time: 09/22/20  9:20 PM   Result Value Ref Range    Vancomycin,trough 23.9 (HH) 5.0 - 10.0 ug/mL    Reported dose date 0      Reported dose: 0 Units       Current Facility-Administered Medications   Medication Dose Route Frequency    piperacillin-tazobactam (ZOSYN) 3.375 g in 0.9% sodium chloride (MBP/ADV) 100 mL MBP  3.375 g IntraVENous Q8H    cyanocobalamin tablet 1,000 mcg  1,000 mcg Oral DAILY    emtricitabine-tenofovir (TDF) (TRUVADA) 200-300 mg per tablet 1 Tab  1 Tab Oral DAILY    enoxaparin (LOVENOX) injection 40 mg  40 mg SubCUTAneous Q24H    pantoprazole (PROTONIX) tablet 40 mg  40 mg Oral ACB    predniSONE (DELTASONE) tablet 20 mg  20 mg Per G Tube DAILY    raltegravir (ISENTRESS) tablet 400 mg  400 mg Oral BID    VANCOMYCIN INFORMATION NOTE   Other Rx Dosing/Monitoring    thiamine mononitrate (B-1) tablet 50 mg  50 mg Oral DAILY    ziprasidone (GEODON) capsule 40 mg  40 mg Per G Tube BID WITH MEALS    acetaminophen (TYLENOL) suppository 650 mg  650 mg Rectal Q4H PRN    acetaminophen (TYLENOL) tablet 650 mg  650 mg Oral Q6H PRN         Assessment/Plan:     COVID-19 pneumonia  Acute  hypoxic respiratory failure resolved  HIV positive  History of transitional cell bladder cancer  Klebsiella pneumonia  Continue to wean oxygen per protocol he is right now he is on room air saturation is about 90%   patient was treated with Zosyn Lovenox prednisone Zithromax and  Actemra  Awaiting for placement

## 2020-09-23 NOTE — PROGRESS NOTES
Discharge Summary       PATIENT ID: Ronna Barillas  MRN: [id number]   YOB: 1950    DATE OF ADMISSION: 8/29/2020  6:25 AM    DATE OF DISCHARGE:   PRIMARY CARE PROVIDER: Renetta Sweet MD     ATTENDING PHYSICIAN: [facility]  DISCHARGING PROVIDER: Sabas Villasenor      CONSULTATIONS: None    PROCEDURES/SURGERIES: * No surgery found *    ADMITTING DIAGNOSES:    Patient Active Problem List    Diagnosis Date Noted    SOB (shortness of breath) 09/12/2020       DISCHARGE DIAGNOSES / PLAN:    Acute hypoxemic respiratory failure onRA   Pneumonia with Klebsiella pneumonia Proteus mirabilis and MRSA on IV vancomycin and Zosyn  COVID-19 pneumonitis  COVID-19 not due to HIV  Severe dehydration  HIV disease  Hepatitis B  History of neurosyphilis  History of transitional cell bladder cancer  Chronic pain syndrome  Thrombocytopenia  Neutropenia  Hyponatremia  No sepsis       ADDITIONAL CARE RECOMMENDATIONS:         DISCHARGE MEDICATIONS:  Current Discharge Medication List      START taking these medications    Details   !! ziprasidone (GEODON) 40 mg capsule 1 Cap by Per G Tube route two (2) times daily (with meals). Qty: 30 Cap, Refills: 0      predniSONE (DELTASONE) 20 mg tablet 20 mg by Per G Tube route daily. Indications: polyarteritis nodosa  Qty: 30 Tab, Refills: 0      thiamine mononitrate (B-1) 100 mg tablet Take 0.5 Tabs by mouth daily. Qty: 30 Tab, Refills: 0      doxycycline (MONODOX) 100 mg capsule Take 1 Cap by mouth two (2) times a day. Qty: 20 Cap, Refills: 0       !! - Potential duplicate medications found. Please discuss with provider. CONTINUE these medications which have NOT CHANGED    Details   albuterol (PROVENTIL VENTOLIN) 2.5 mg /3 mL (0.083 %) nebu 2.5 mg by Nebulization route every two (2) hours as needed for Wheezing or Shortness of Breath. cyanocobalamin 1,000 mcg tablet Take 1,000 mcg by mouth daily.       emtricitabine-tenofovir, TDF, (TRUVADA) 200-300 mg per tablet Take 1 Tab by mouth daily. lansoprazole (PREVACID) 30 mg capsule 30 mg by Per G Tube route Daily (before breakfast). raltegravir (ISENTRESS) 400 mg tablet Take 400 mg by mouth two (2) times a day. thiamine (B-1) 50 mg tablet Take 50 mg by mouth daily. !! ziprasidone (GEODON) 40 mg capsule 40 mg by Per G Tube route two (2) times daily (with meals). !! - Potential duplicate medications found. Please discuss with provider. STOP taking these medications       acetaminophen (TYLENOL) 325 mg tablet Comments:   Reason for Stopping:         albuterol sulfate (PROVENTIL;VENTOLIN) 2.5 mg/0.5 mL nebu nebulizer solution Comments:   Reason for Stopping:         fentaNYL (DURAGESIC) 25 mcg/hr PATCH Comments:   Reason for Stopping:                 NOTIFY YOUR PHYSICIAN FOR ANY OF THE FOLLOWING:   Fever over 101 degrees for 24 hours. Chest pain, shortness of breath, fever, chills, nausea, vomiting, diarrhea, change in mentation, falling, weakness, bleeding. Severe pain or pain not relieved by medications. Or, any other signs or symptoms that you may have questions about. DISPOSITION:  x  Home With:   OT  PT  HH  RN       Long term SNF/Inpatient Rehab    Independent/assisted living    Hospice    Other:       PATIENT CONDITION AT DISCHARGE: Stable      PHYSICAL EXAMINATION AT DISCHARGE:  General:          Alert, cooperative, no distress, appears stated age. HEENT:           Atraumatic, anicteric sclerae, pink conjunctivae                          No oral ulcers, mucosa moist, throat clear, dentition fair  Neck:               Supple, symmetrical  Lungs:             Clear to auscultation bilaterally. No Wheezing or Rhonchi. No rales. Chest wall:      No tenderness  No Accessory muscle use. Heart:              Regular  rhythm,  No  murmur   No edema  Abdomen:        Soft, non-tender. Not distended. Bowel sounds normal  Extremities:     No cyanosis.   No clubbing,                            Skin turgor normal, Capillary refill normal  Skin:                Not pale. Not Jaundiced  No rashes   Psych:             Not anxious or agitated.   Neurologic:      Alert, moves all extremities, answers questions appropriately and responds to commands     No orders to display        Recent Results (from the past 24 hour(s))   VANCOMYCIN, TROUGH    Collection Time: 09/22/20  9:20 PM   Result Value Ref Range    Vancomycin,trough 23.9 (HH) 5.0 - 10.0 ug/mL    Reported dose date 0      Reported dose: 0 Units          HOSPITAL COURSE:  To present illness please refer to history and physical at the time of the admission as the patient was admitted because of acute hypoxemic respiratory failure pneumonia with Klebsiella pneumonia and MRSA patient was IV treated with IV vancomycin and Zosyn COVID-19 was positive seen by the pulmonologist and also seen by the nephrologist patient was hypoxemic for a while now patient was weaned off on room air saturation above 90% patient bedbound nonverbal active failure patient received course of IV antibiotic will discharge back to the nursing home on p.o. doxycycline      Patient resting the bed not any distress waiting for placement    Signed:   Maryann Gant MD  9/23/2020  1:09 PM

## 2020-09-24 PROCEDURE — 87635 SARS-COV-2 COVID-19 AMP PRB: CPT

## 2020-09-24 PROCEDURE — 74011250636 HC RX REV CODE- 250/636: Performed by: INTERNAL MEDICINE

## 2020-09-24 PROCEDURE — 74011636637 HC RX REV CODE- 636/637: Performed by: FAMILY MEDICINE

## 2020-09-24 PROCEDURE — 74011000258 HC RX REV CODE- 258: Performed by: INTERNAL MEDICINE

## 2020-09-24 PROCEDURE — 74011250636 HC RX REV CODE- 250/636: Performed by: FAMILY MEDICINE

## 2020-09-24 PROCEDURE — 74011250637 HC RX REV CODE- 250/637: Performed by: FAMILY MEDICINE

## 2020-09-24 PROCEDURE — 65270000029 HC RM PRIVATE

## 2020-09-24 RX ADMIN — PIPERACILLIN SODIUM AND TAZOBACTAM SODIUM 3.38 G: 3; .375 INJECTION, POWDER, LYOPHILIZED, FOR SOLUTION INTRAVENOUS at 18:33

## 2020-09-24 RX ADMIN — PIPERACILLIN SODIUM AND TAZOBACTAM SODIUM 3.38 G: 3; .375 INJECTION, POWDER, LYOPHILIZED, FOR SOLUTION INTRAVENOUS at 10:15

## 2020-09-24 RX ADMIN — SODIUM CHLORIDE 1000 MG: 9 INJECTION, SOLUTION INTRAVENOUS at 12:00

## 2020-09-24 RX ADMIN — PIPERACILLIN SODIUM AND TAZOBACTAM SODIUM 3.38 G: 3; .375 INJECTION, POWDER, LYOPHILIZED, FOR SOLUTION INTRAVENOUS at 04:03

## 2020-09-24 RX ADMIN — ZIPRASIDONE HCL 40 MG: 40 CAPSULE ORAL at 10:15

## 2020-09-24 RX ADMIN — PANTOPRAZOLE SODIUM 40 MG: 40 TABLET, DELAYED RELEASE ORAL at 10:15

## 2020-09-24 RX ADMIN — RALTEGRAVIR 400 MG: 400 TABLET, FILM COATED ORAL at 18:33

## 2020-09-24 RX ADMIN — THIAMINE HCL TAB 100 MG 50 MG: 100 TAB at 10:15

## 2020-09-24 RX ADMIN — PREDNISONE 20 MG: 20 TABLET ORAL at 10:15

## 2020-09-24 RX ADMIN — SODIUM CHLORIDE 1000 MG: 9 INJECTION, SOLUTION INTRAVENOUS at 00:58

## 2020-09-24 RX ADMIN — RALTEGRAVIR 400 MG: 400 TABLET, FILM COATED ORAL at 10:15

## 2020-09-24 RX ADMIN — ZIPRASIDONE HCL 40 MG: 40 CAPSULE ORAL at 18:33

## 2020-09-24 RX ADMIN — ENOXAPARIN SODIUM 40 MG: 40 INJECTION SUBCUTANEOUS at 10:15

## 2020-09-24 NOTE — ROUTINE PROCESS
Bedside and Verbal shift change report given to Yas Hernandez (oncoming nurse) by Radha Corley RN (offgoing nurse). Report included the following information SBAR, Kardex and MAR.

## 2020-09-24 NOTE — PROGRESS NOTES
Discharge Summary       PATIENT ID: Annita Mcdermott  MRN: [id number]   YOB: 1950    DATE OF ADMISSION: 8/29/2020  6:25 AM    DATE OF DISCHARGE:   PRIMARY CARE PROVIDER: Ariane Sanchez MD     ATTENDING PHYSICIAN: [address] [place] [address]  DISCHARGING PROVIDER: Cecy Villasenor      CONSULTATIONS: None    PROCEDURES/SURGERIES: * No surgery found *    ADMITTING DIAGNOSES:    Patient Active Problem List    Diagnosis Date Noted    SOB (shortness of breath) 09/12/2020       DISCHARGE DIAGNOSES / PLAN:    Acute hypoxemic respiratory failure onRA   Pneumonia with Klebsiella pneumonia Proteus mirabilis and MRSA on IV vancomycin and Zosyn  COVID-19 pneumonitis  COVID-19 not due to HIV  Severe dehydration  HIV disease  Hepatitis B  History of neurosyphilis  History of transitional cell bladder cancer  Chronic pain syndrome  Thrombocytopenia  Neutropenia  Hyponatremia  No sepsis       ADDITIONAL CARE RECOMMENDATIONS:         DISCHARGE MEDICATIONS:  Current Discharge Medication List      START taking these medications    Details   !! ziprasidone (GEODON) 40 mg capsule 1 Cap by Per G Tube route two (2) times daily (with meals). Qty: 30 Cap, Refills: 0      predniSONE (DELTASONE) 20 mg tablet 20 mg by Per G Tube route daily. Indications: polyarteritis nodosa  Qty: 30 Tab, Refills: 0      thiamine mononitrate (B-1) 100 mg tablet Take 0.5 Tabs by mouth daily. Qty: 30 Tab, Refills: 0      doxycycline (MONODOX) 100 mg capsule Take 1 Cap by mouth two (2) times a day. Qty: 20 Cap, Refills: 0       !! - Potential duplicate medications found. Please discuss with provider. CONTINUE these medications which have NOT CHANGED    Details   albuterol (PROVENTIL VENTOLIN) 2.5 mg /3 mL (0.083 %) nebu 2.5 mg by Nebulization route every two (2) hours as needed for Wheezing or Shortness of Breath. cyanocobalamin 1,000 mcg tablet Take 1,000 mcg by mouth daily.       emtricitabine-tenofovir, TDF, (TRUVADA) 200-300 mg per tablet Take 1 Tab by mouth daily. lansoprazole (PREVACID) 30 mg capsule 30 mg by Per G Tube route Daily (before breakfast). raltegravir (ISENTRESS) 400 mg tablet Take 400 mg by mouth two (2) times a day. thiamine (B-1) 50 mg tablet Take 50 mg by mouth daily. !! ziprasidone (GEODON) 40 mg capsule 40 mg by Per G Tube route two (2) times daily (with meals). !! - Potential duplicate medications found. Please discuss with provider. STOP taking these medications       acetaminophen (TYLENOL) 325 mg tablet Comments:   Reason for Stopping:         albuterol sulfate (PROVENTIL;VENTOLIN) 2.5 mg/0.5 mL nebu nebulizer solution Comments:   Reason for Stopping:         fentaNYL (DURAGESIC) 25 mcg/hr PATCH Comments:   Reason for Stopping:                 NOTIFY YOUR PHYSICIAN FOR ANY OF THE FOLLOWING:   Fever over 101 degrees for 24 hours. Chest pain, shortness of breath, fever, chills, nausea, vomiting, diarrhea, change in mentation, falling, weakness, bleeding. Severe pain or pain not relieved by medications. Or, any other signs or symptoms that you may have questions about. DISPOSITION:  x  Home With:   OT  PT  HH  RN       Long term SNF/Inpatient Rehab    Independent/assisted living    Hospice    Other:       PATIENT CONDITION AT DISCHARGE: Stable      PHYSICAL EXAMINATION AT DISCHARGE:  General:          Alert, cooperative, no distress, appears stated age. HEENT:           Atraumatic, anicteric sclerae, pink conjunctivae                          No oral ulcers, mucosa moist, throat clear, dentition fair  Neck:               Supple, symmetrical  Lungs:             Clear to auscultation bilaterally. No Wheezing or Rhonchi. No rales. Chest wall:      No tenderness  No Accessory muscle use. Heart:              Regular  rhythm,  No  murmur   No edema  Abdomen:        Soft, non-tender. Not distended. Bowel sounds normal  Extremities:     No cyanosis.   No clubbing,                            Skin turgor normal, Capillary refill normal  Skin:                Not pale. Not Jaundiced  No rashes   Psych:             Not anxious or agitated. Neurologic:      Alert, moves all extremities, answers questions appropriately and responds to commands     No orders to display        No results found for this or any previous visit (from the past 24 hour(s)).        HOSPITAL COURSE:  To present illness please refer to history and physical at the time of the admission as the patient was admitted because of acute hypoxemic respiratory failure pneumonia with Klebsiella pneumonia and MRSA patient was IV treated with IV vancomycin and Zosyn COVID-19 was positive seen by the pulmonologist and also seen by the nephrologist patient was hypoxemic for a while now patient was weaned off on room air saturation above 90% patient bedbound nonverbal active failure patient received course of IV antibiotic will discharge back to the nursing home on p.o. doxycycline      Patient resting the bed not any distress waiting for placement    Signed:   Summer Bhandari MD  9/24/2020  1:09 PM

## 2020-09-24 NOTE — PROGRESS NOTES
Comprehensive Nutrition Assessment    Type and Reason for Visit: Reassess    Nutrition Recommendations/Plan:   Continue current TF regimen    Nutrition Assessment:  Currently on RA. COVID pending. Awaiting placement. TF remains at goal rate without residuals. RN endorses good tolerance. Unsure if TF being held x4 hours per ordered regimen. Labs: Na 147. Meds: zosyn, vanc, prednisone, thiamine, PPI    Malnutrition Assessment:  Malnutrition Status:  No malnutrition      Estimated Daily Nutrient Needs:  Energy (kcal):  2350kcal/day (28kcal/kg)  Protein (g):  84g protein (1g/kg)       Fluid (ml/day):  2375mL/day (1mL/kg)    Nutrition Related Findings:  Unable to complete NFPE. Appears nourished per RN. Last BM documented 1x week ago. Wounds:    None       Current Nutrition Therapies:   Current Tube Feeding (TF) Orders:   · Feeding Route: PEG  · Formula: Jevity 1.5  · Schedule:Cyclic    · Regimen: 61GW/QX x20 hours  · Additives/Modulars:    · Water Flushes: 250mL Free water q4hr  · Current TF & Flush Orders Provides: 2400kcal;, 102g protein, 2716mL fluid    Anthropometric Measures:  · Height:  5' 10\" (177.8 cm)  · Current Body Wt:  84 kg (185 lb 3 oz)   · Admission Body Wt:  195 lb 8.8 oz      · Ideal Body Wt:  166 lbs:  111.6 %   · BMI Category: Overweight (BMI 25.0-29. 9)       Nutrition Diagnosis:   · Increased nutrient needs related to catabolic illness as evidenced by (HIV)      Nutrition Interventions:   Food and/or Nutrient Delivery: Continue NPO, Continue tube feeding  Nutrition Education and Counseling: No recommendations at this time  Coordination of Nutrition Care: No recommendation at this time    Goals:  Meet >75% EENs via TF x7 days, Wt maintenance +/- 0.5kg per week       Nutrition Monitoring and Evaluation:   Physical Signs/Symptoms Outcomes: Weight    Discharge Planning:    Enteral nutrition     Electronically signed by Bernabe London on 9/24/2020 at 4:28 PM    Contact:

## 2020-09-24 NOTE — PROGRESS NOTES
Pulmonary Progress Note    Subjective:   Daily Progress Note: 2020 1:58 PM    CC: From here arm diabetes with fever aspiration    HPI: 62-year-old male came in with shortness of breath, Emmy Reyes he was COVID positive also is HIV positive    Patient in distress on oxygen nasal cannula mouth open but does not communicate  He is removing his oxygen nasal cannula  Now he is on room air saturation about 90%  Review of Systems  Unable to obtain    Objective:     Visit Vitals  BP (!) 147/90   Pulse 78   Temp 97.2 °F (36.2 °C)   Resp 20   Ht 5' 10\" (1.778 m)   Wt 81.8 kg (180 lb 5.4 oz)   SpO2 97%   BMI 25.88 kg/m²    O2 Flow Rate (L/min): 4 l/min O2 Device: Room air    Temp (24hrs), Av.7 °F (36.5 °C), Min:97.2 °F (36.2 °C), Max:98 °F (36.7 °C)      No intake/output data recorded.  1901 -  0700  In: 200   Out: 250 [Urine:250]    Visit Vitals  BP (!) 147/90   Pulse 78   Temp 97.2 °F (36.2 °C)   Resp 20   Ht 5' 10\" (1.778 m)   Wt 81.8 kg (180 lb 5.4 oz)   SpO2 97%   BMI 25.88 kg/m²     General:  Alert, cooperative, no distress, appears stated age. Head:  Normocephalic, without obvious abnormality, atraumatic. Eyes:  Conjunctivae/corneas clear. PERRL, EOMs intact. Fundi benign   Ears:  Normal TMs and external ear canals both ears. Nose: Nares normal. Septum midline. Mucosa normal. No drainage or sinus tenderness. Throat: Lips, mucosa, and tongue normal. Teeth and gums normal.   Neck: Supple, symmetrical, trachea midline, no adenopathy, thyroid: no enlargement/tenderness/nodules, no carotid bruit and no JVD. Back:   Symmetric, no curvature. ROM normal. No CVA tenderness. Lungs:   Clear to auscultation bilaterally. Chest wall:  No tenderness or deformity. Heart:  Regular rate and rhythm, S1, S2 normal, no murmur, click, rub or gallop. Abdomen:   Soft, non-tender. Bowel sounds normal. No masses,  No organomegaly. Genitalia:  Normal male without lesion, discharge or tenderness.    Rectal: Normal tone, normal prostate, no masses or tenderness  Guaiac negative stool. Extremities: Extremities normal, atraumatic, no cyanosis or edema. Pulses: 2+ and symmetric all extremities. Skin: Skin color, texture, turgor normal. No rashes or lesions   Lymph nodes: Cervical, supraclavicular, and axillary nodes normal.               Data Review    No results found for this or any previous visit (from the past 24 hour(s)). Current Facility-Administered Medications   Medication Dose Route Frequency    vancomycin (VANCOCIN) 1,000 mg in 0.9% sodium chloride 250 mL (VIAL-MATE)  1,000 mg IntraVENous Q12H    [START ON 9/25/2020] vancomyicn trough level on 9/25/2020 at 1000.    Other ONCE    piperacillin-tazobactam (ZOSYN) 3.375 g in 0.9% sodium chloride (MBP/ADV) 100 mL MBP  3.375 g IntraVENous Q8H    cyanocobalamin tablet 1,000 mcg  1,000 mcg Oral DAILY    emtricitabine-tenofovir (TDF) (TRUVADA) 200-300 mg per tablet 1 Tab  1 Tab Oral DAILY    enoxaparin (LOVENOX) injection 40 mg  40 mg SubCUTAneous Q24H    pantoprazole (PROTONIX) tablet 40 mg  40 mg Oral ACB    predniSONE (DELTASONE) tablet 20 mg  20 mg Per G Tube DAILY    raltegravir (ISENTRESS) tablet 400 mg  400 mg Oral BID    VANCOMYCIN INFORMATION NOTE   Other Rx Dosing/Monitoring    thiamine mononitrate (B-1) tablet 50 mg  50 mg Oral DAILY    ziprasidone (GEODON) capsule 40 mg  40 mg Per G Tube BID WITH MEALS    acetaminophen (TYLENOL) suppository 650 mg  650 mg Rectal Q4H PRN    acetaminophen (TYLENOL) tablet 650 mg  650 mg Oral Q6H PRN         Assessment/Plan:     COVID-19 pneumonia  Acute  hypoxic respiratory failure resolved  HIV positive  History of transitional cell bladder cancer  Klebsiella pneumonia resolved  Continue to wean oxygen per protocol he is right now he is on room air saturation is about 90%   patient was treated with Zosyn Lovenox prednisone Zithromax and  Actemra  Awaiting for placement

## 2020-09-25 LAB
DATE LAST DOSE: ABNORMAL
REPORTED DOSE,DOSE: ABNORMAL UNITS
SARS-COV-2, COV2: NORMAL
VANCOMYCIN TROUGH SERPL-MCNC: 15.5 UG/ML (ref 5–10)

## 2020-09-25 PROCEDURE — 74011250636 HC RX REV CODE- 250/636: Performed by: INTERNAL MEDICINE

## 2020-09-25 PROCEDURE — 74011250637 HC RX REV CODE- 250/637: Performed by: FAMILY MEDICINE

## 2020-09-25 PROCEDURE — 74011636637 HC RX REV CODE- 636/637: Performed by: FAMILY MEDICINE

## 2020-09-25 PROCEDURE — 74011250636 HC RX REV CODE- 250/636: Performed by: FAMILY MEDICINE

## 2020-09-25 PROCEDURE — 74011000258 HC RX REV CODE- 258: Performed by: INTERNAL MEDICINE

## 2020-09-25 PROCEDURE — 36415 COLL VENOUS BLD VENIPUNCTURE: CPT

## 2020-09-25 PROCEDURE — 80202 ASSAY OF VANCOMYCIN: CPT

## 2020-09-25 PROCEDURE — 65270000029 HC RM PRIVATE

## 2020-09-25 PROCEDURE — 94760 N-INVAS EAR/PLS OXIMETRY 1: CPT

## 2020-09-25 RX ORDER — PANTOPRAZOLE SODIUM 40 MG/1
40 GRANULE, DELAYED RELEASE ORAL
Status: DISCONTINUED | OUTPATIENT
Start: 2020-09-25 | End: 2020-09-28 | Stop reason: HOSPADM

## 2020-09-25 RX ADMIN — EMTRICITABINE AND TENOFOVIR DISOPROXIL FUMARATE 1 TABLET: 200; 300 TABLET, FILM COATED ORAL at 09:00

## 2020-09-25 RX ADMIN — PIPERACILLIN SODIUM AND TAZOBACTAM SODIUM 3.38 G: 3; .375 INJECTION, POWDER, LYOPHILIZED, FOR SOLUTION INTRAVENOUS at 11:39

## 2020-09-25 RX ADMIN — ZIPRASIDONE HCL 40 MG: 40 CAPSULE ORAL at 19:14

## 2020-09-25 RX ADMIN — THIAMINE HCL TAB 100 MG 50 MG: 100 TAB at 11:32

## 2020-09-25 RX ADMIN — RALTEGRAVIR 400 MG: 400 TABLET, FILM COATED ORAL at 19:14

## 2020-09-25 RX ADMIN — CYANOCOBALAMIN TAB 1000 MCG 1000 MCG: 1000 TAB at 11:32

## 2020-09-25 RX ADMIN — ZIPRASIDONE HCL 40 MG: 40 CAPSULE ORAL at 11:33

## 2020-09-25 RX ADMIN — PIPERACILLIN SODIUM AND TAZOBACTAM SODIUM 3.38 G: 3; .375 INJECTION, POWDER, LYOPHILIZED, FOR SOLUTION INTRAVENOUS at 03:36

## 2020-09-25 RX ADMIN — PREDNISONE 20 MG: 20 TABLET ORAL at 11:33

## 2020-09-25 RX ADMIN — PANTOPRAZOLE SODIUM 40 MG: 40 GRANULE, DELAYED RELEASE ORAL at 14:26

## 2020-09-25 RX ADMIN — SODIUM CHLORIDE 1000 MG: 9 INJECTION, SOLUTION INTRAVENOUS at 00:54

## 2020-09-25 RX ADMIN — PIPERACILLIN SODIUM AND TAZOBACTAM SODIUM 3.38 G: 3; .375 INJECTION, POWDER, LYOPHILIZED, FOR SOLUTION INTRAVENOUS at 20:24

## 2020-09-25 RX ADMIN — RALTEGRAVIR 400 MG: 400 TABLET, FILM COATED ORAL at 11:32

## 2020-09-25 RX ADMIN — ENOXAPARIN SODIUM 40 MG: 40 INJECTION SUBCUTANEOUS at 11:33
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
## 2020-09-25 NOTE — PROGRESS NOTES
Discharge Summary       PATIENT ID: Shawna Pacheco  MRN: [id number]   YOB: 1950    DATE OF ADMISSION: 8/29/2020  6:25 AM    DATE OF DISCHARGE:   PRIMARY CARE PROVIDER: Chapis Shay MD     ATTENDING PHYSICIAN: Reji Madrid  DISCHARGING PROVIDER: Carson Villasenor      CONSULTATIONS: None    PROCEDURES/SURGERIES: * No surgery found *    ADMITTING DIAGNOSES:    Patient Active Problem List    Diagnosis Date Noted    SOB (shortness of breath) 09/12/2020       DISCHARGE DIAGNOSES / PLAN:    Acute hypoxemic respiratory failure onRA   Pneumonia with Klebsiella pneumonia Proteus mirabilis and MRSA on IV vancomycin and Zosyn  COVID-19 pneumonitis  COVID-19 not due to HIV  Severe dehydration  HIV disease  Hepatitis B  History of neurosyphilis  History of transitional cell bladder cancer  Chronic pain syndrome  Thrombocytopenia  Neutropenia  Hyponatremia  No sepsis       ADDITIONAL CARE RECOMMENDATIONS:         DISCHARGE MEDICATIONS:  Current Discharge Medication List      START taking these medications    Details   !! ziprasidone (GEODON) 40 mg capsule 1 Cap by Per G Tube route two (2) times daily (with meals). Qty: 30 Cap, Refills: 0      predniSONE (DELTASONE) 20 mg tablet 20 mg by Per G Tube route daily. Indications: polyarteritis nodosa  Qty: 30 Tab, Refills: 0      thiamine mononitrate (B-1) 100 mg tablet Take 0.5 Tabs by mouth daily. Qty: 30 Tab, Refills: 0      doxycycline (MONODOX) 100 mg capsule Take 1 Cap by mouth two (2) times a day. Qty: 20 Cap, Refills: 0       !! - Potential duplicate medications found. Please discuss with provider. CONTINUE these medications which have NOT CHANGED    Details   albuterol (PROVENTIL VENTOLIN) 2.5 mg /3 mL (0.083 %) nebu 2.5 mg by Nebulization route every two (2) hours as needed for Wheezing or Shortness of Breath. cyanocobalamin 1,000 mcg tablet Take 1,000 mcg by mouth daily.       emtricitabine-tenofovir, TDF, (TRUVADA) 200-300 mg per tablet Take 1 Tab by mouth daily. lansoprazole (PREVACID) 30 mg capsule 30 mg by Per G Tube route Daily (before breakfast). raltegravir (ISENTRESS) 400 mg tablet Take 400 mg by mouth two (2) times a day. thiamine (B-1) 50 mg tablet Take 50 mg by mouth daily. !! ziprasidone (GEODON) 40 mg capsule 40 mg by Per G Tube route two (2) times daily (with meals). !! - Potential duplicate medications found. Please discuss with provider. STOP taking these medications       acetaminophen (TYLENOL) 325 mg tablet Comments:   Reason for Stopping:         albuterol sulfate (PROVENTIL;VENTOLIN) 2.5 mg/0.5 mL nebu nebulizer solution Comments:   Reason for Stopping:         fentaNYL (DURAGESIC) 25 mcg/hr PATCH Comments:   Reason for Stopping:                 NOTIFY YOUR PHYSICIAN FOR ANY OF THE FOLLOWING:   Fever over 101 degrees for 24 hours. Chest pain, shortness of breath, fever, chills, nausea, vomiting, diarrhea, change in mentation, falling, weakness, bleeding. Severe pain or pain not relieved by medications. Or, any other signs or symptoms that you may have questions about. DISPOSITION:  x  Home With:   OT  PT  HH  RN       Long term SNF/Inpatient Rehab    Independent/assisted living    Hospice    Other:       PATIENT CONDITION AT DISCHARGE: Stable      PHYSICAL EXAMINATION AT DISCHARGE:  General:          Alert, cooperative, no distress, appears stated age. HEENT:           Atraumatic, anicteric sclerae, pink conjunctivae                          No oral ulcers, mucosa moist, throat clear, dentition fair  Neck:               Supple, symmetrical  Lungs:             Clear to auscultation bilaterally. No Wheezing or Rhonchi. No rales. Chest wall:      No tenderness  No Accessory muscle use. Heart:              Regular  rhythm,  No  murmur   No edema  Abdomen:        Soft, non-tender. Not distended. Bowel sounds normal  Extremities:     No cyanosis.   No clubbing,                            Skin turgor normal, Capillary refill normal  Skin:                Not pale. Not Jaundiced  No rashes   Psych:             Not anxious or agitated. Neurologic:      Alert, moves all extremities, answers questions appropriately and responds to commands     No orders to display        No results found for this or any previous visit (from the past 24 hour(s)).        HOSPITAL COURSE:  To present illness please refer to history and physical at the time of the admission as the patient was admitted because of acute hypoxemic respiratory failure pneumonia with Klebsiella pneumonia and MRSA patient was IV treated with IV vancomycin and Zosyn COVID-19 was positive seen by the pulmonologist and also seen by the nephrologist patient was hypoxemic for a while now patient was weaned off on room air saturation above 90% patient bedbound nonverbal active failure patient received course of IV antibiotic will discharge back to the nursing home on p.o. doxycycline      Patient resting the bed not any distress waiting for placement    Signed:   Du Altamirano MD  9/25/2020  1:09 PM

## 2020-09-25 NOTE — PROGRESS NOTES
Comprehensive Nutrition Assessment    Type and Reason for Visit: Reassess    Nutrition Recommendations/Plan:   Continue TF via PEG of Jevity 1.5 at 80mL/hr x20 hours  Flush with 250mL free water q4hr (around the clock)  Goal feeds provide 2400kcal, 102g pro, 2716mL fluid (meeting >/=100% est needs)    Nutrition Assessment:  Repeat COVID pending; plans to d/c as able. TF remains at goal without residuals, RN endorses continued good tolerance. Unable to determine if TF being held x4 hours overnight per ordered regimen. RN unsure. No updates to labs or meds. Malnutrition Assessment:  Malnutrition Status:  No malnutrition      Estimated Daily Nutrient Needs:  Energy (kcal):  2300kcal (28kcal/kg)  Protein (g):  82g pro (1g/kg)       Fluid (ml/day):  2375mL (1mL/kg)    Nutrition Related Findings:  Unable to complete NFPE. Last BM yesterday, per RN. No edema. Wounds:    None       Current Nutrition Therapies:   Current Tube Feeding (TF) Orders:   · Feeding Route: PEG  · Formula: Jevity 1.5  · Schedule:Cyclic    · Regimen: 76IH/KB x20 hours  · Water Flushes: 250mL q4hr  · Current TF & Flush Orders Provides: 2400kcal, 102g protein, 2716mL fluid      Anthropometric Measures:  · Height:  5' 10\" (177.8 cm)  · Current Body Wt:  82.2 kg (181 lb 3.5 oz)   · Admission Body Wt:  195 lb 8.8 oz    · Ideal Body Wt:  166 lbs:  109.2 %   · BMI Category: Overweight (BMI 25.0-29. 9)       Nutrition Diagnosis:   · Increased nutrient needs related to catabolic illness as evidenced by (HIV)      Nutrition Interventions:   Food and/or Nutrient Delivery: Continue NPO, Continue tube feeding  Nutrition Education and Counseling: No recommendations at this time  Coordination of Nutrition Care: No recommendation at this time    Goals:  Meet >75% EENs via TF x7 days, Wt maintenance +/- 0.5kg per week       Nutrition Monitoring and Evaluation:   Behavioral-Environmental Outcomes:   N/A  Food/Nutrient Intake Outcomes: Enteral nutrition intake/tolerance  Physical Signs/Symptoms Outcomes: Weight    Discharge Planning:    Enteral nutrition     Electronically signed by Chelsey Cox on 9/25/2020 at 4:11 PM    Contact:

## 2020-09-25 NOTE — PROGRESS NOTES
Bedside shift change report given to Gabriela Nicholas RN (oncoming nurse) by Cristian Butler RN  (offgoing nurse). Report included the following information SBAR.

## 2020-09-25 NOTE — PROGRESS NOTES
Pulmonary Progress Note    Subjective:   Daily Progress Note: 2020 1:58 PM    CC: From here arm diabetes with fever aspiration    HPI: 73-year-old male came in with shortness of breath, Michelle Elliott he was COVID positive also is HIV positive    Patient in distress on oxygen nasal cannula mouth open but does not communicate  He is removing his oxygen nasal cannula  Now he is on room air saturation about 90%  Review of Systems  Unable to obtain    Objective:     Visit Vitals  /71   Pulse 82   Temp 97.5 °F (36.4 °C)   Resp 18   Ht 5' 10\" (1.778 m)   Wt 82.2 kg (181 lb 3.5 oz)   SpO2 94%   BMI 26.00 kg/m²    O2 Flow Rate (L/min): 4 l/min O2 Device: Room air    Temp (24hrs), Av.8 °F (36.6 °C), Min:97.3 °F (36.3 °C), Max:98.2 °F (36.8 °C)      701 - 1900  In: 3012   Out: -   1901 -  07  In: 600   Out: 400 [Urine:400]    Visit Vitals  /71   Pulse 82   Temp 97.5 °F (36.4 °C)   Resp 18   Ht 5' 10\" (1.778 m)   Wt 82.2 kg (181 lb 3.5 oz)   SpO2 94%   BMI 26.00 kg/m²     General:  Alert, cooperative, no distress, appears stated age. Head:  Normocephalic, without obvious abnormality, atraumatic. Eyes:  Conjunctivae/corneas clear. PERRL, EOMs intact. Fundi benign   Ears:  Normal TMs and external ear canals both ears. Nose: Nares normal. Septum midline. Mucosa normal. No drainage or sinus tenderness. Throat: Lips, mucosa, and tongue normal. Teeth and gums normal.   Neck: Supple, symmetrical, trachea midline, no adenopathy, thyroid: no enlargement/tenderness/nodules, no carotid bruit and no JVD. Back:   Symmetric, no curvature. ROM normal. No CVA tenderness. Lungs:   Clear to auscultation bilaterally. Chest wall:  No tenderness or deformity. Heart:  Regular rate and rhythm, S1, S2 normal, no murmur, click, rub or gallop. Abdomen:   Soft, non-tender. Bowel sounds normal. No masses,  No organomegaly. Genitalia:  Normal male without lesion, discharge or tenderness. Rectal:  Normal tone, normal prostate, no masses or tenderness  Guaiac negative stool. Extremities: Extremities normal, atraumatic, no cyanosis or edema. Pulses: 2+ and symmetric all extremities. Skin: Skin color, texture, turgor normal. No rashes or lesions   Lymph nodes: Cervical, supraclavicular, and axillary nodes normal.               Data Review    No results found for this or any previous visit (from the past 24 hour(s)). Current Facility-Administered Medications   Medication Dose Route Frequency    vancomycin (VANCOCIN) 1,000 mg in 0.9% sodium chloride 250 mL (VIAL-MATE)  1,000 mg IntraVENous Q12H    vancomyicn trough level on 9/25/2020 at 1000.    Other ONCE    piperacillin-tazobactam (ZOSYN) 3.375 g in 0.9% sodium chloride (MBP/ADV) 100 mL MBP  3.375 g IntraVENous Q8H    cyanocobalamin tablet 1,000 mcg  1,000 mcg Oral DAILY    emtricitabine-tenofovir (TDF) (TRUVADA) 200-300 mg per tablet 1 Tab  1 Tab Oral DAILY    enoxaparin (LOVENOX) injection 40 mg  40 mg SubCUTAneous Q24H    pantoprazole (PROTONIX) tablet 40 mg  40 mg Oral ACB    predniSONE (DELTASONE) tablet 20 mg  20 mg Per G Tube DAILY    raltegravir (ISENTRESS) tablet 400 mg  400 mg Oral BID    VANCOMYCIN INFORMATION NOTE   Other Rx Dosing/Monitoring    thiamine mononitrate (B-1) tablet 50 mg  50 mg Oral DAILY    ziprasidone (GEODON) capsule 40 mg  40 mg Per G Tube BID WITH MEALS    acetaminophen (TYLENOL) suppository 650 mg  650 mg Rectal Q4H PRN    acetaminophen (TYLENOL) tablet 650 mg  650 mg Oral Q6H PRN         Assessment/Plan:     COVID-19 pneumonia  Acute  hypoxic respiratory failure resolved  HIV positive  History of transitional cell bladder cancer  Klebsiella pneumonia resolved  Continue to wean oxygen per protocol he is right now he is on room air saturation is about 90%   patient was treated with Zosyn Lovenox prednisone Zithromax and  Actemra  Awaiting for placement

## 2020-09-25 NOTE — PROGRESS NOTES
Assumed care from Ashanti Hendrix RN.    1930:Bedside and Verbal shift change report given to Marna Runner, RN (oncoming nurse) by Anthony Madrigal RN (offgoing nurse). Report included the following information SBAR, Kardex, Intake/Output, MAR, Recent Results and Med Rec Status.

## 2020-09-26 PROCEDURE — 74011250636 HC RX REV CODE- 250/636: Performed by: INTERNAL MEDICINE

## 2020-09-26 PROCEDURE — 74011250637 HC RX REV CODE- 250/637: Performed by: FAMILY MEDICINE

## 2020-09-26 PROCEDURE — 74011000258 HC RX REV CODE- 258: Performed by: INTERNAL MEDICINE

## 2020-09-26 PROCEDURE — 74011250636 HC RX REV CODE- 250/636: Performed by: FAMILY MEDICINE

## 2020-09-26 PROCEDURE — 94762 N-INVAS EAR/PLS OXIMTRY CONT: CPT

## 2020-09-26 PROCEDURE — 65270000029 HC RM PRIVATE

## 2020-09-26 PROCEDURE — 74011636637 HC RX REV CODE- 636/637: Performed by: FAMILY MEDICINE

## 2020-09-26 RX ADMIN — PIPERACILLIN SODIUM AND TAZOBACTAM SODIUM 3.38 G: 3; .375 INJECTION, POWDER, LYOPHILIZED, FOR SOLUTION INTRAVENOUS at 18:41

## 2020-09-26 RX ADMIN — RALTEGRAVIR 400 MG: 400 TABLET, FILM COATED ORAL at 11:04

## 2020-09-26 RX ADMIN — CYANOCOBALAMIN TAB 1000 MCG 1000 MCG: 1000 TAB at 11:04

## 2020-09-26 RX ADMIN — RALTEGRAVIR 400 MG: 400 TABLET, FILM COATED ORAL at 18:41

## 2020-09-26 RX ADMIN — ZIPRASIDONE HCL 40 MG: 40 CAPSULE ORAL at 18:41

## 2020-09-26 RX ADMIN — EMTRICITABINE AND TENOFOVIR DISOPROXIL FUMARATE 1 TABLET: 200; 300 TABLET, FILM COATED ORAL at 09:00

## 2020-09-26 RX ADMIN — THIAMINE HCL TAB 100 MG 50 MG: 100 TAB at 11:04

## 2020-09-26 RX ADMIN — PIPERACILLIN SODIUM AND TAZOBACTAM SODIUM 3.38 G: 3; .375 INJECTION, POWDER, LYOPHILIZED, FOR SOLUTION INTRAVENOUS at 11:21

## 2020-09-26 RX ADMIN — ZIPRASIDONE HCL 40 MG: 40 CAPSULE ORAL at 11:04

## 2020-09-26 RX ADMIN — PIPERACILLIN SODIUM AND TAZOBACTAM SODIUM 3.38 G: 3; .375 INJECTION, POWDER, LYOPHILIZED, FOR SOLUTION INTRAVENOUS at 03:55

## 2020-09-26 RX ADMIN — PANTOPRAZOLE SODIUM 40 MG: 40 GRANULE, DELAYED RELEASE ORAL at 11:03

## 2020-09-26 RX ADMIN — PREDNISONE 20 MG: 20 TABLET ORAL at 11:04

## 2020-09-26 RX ADMIN — ENOXAPARIN SODIUM 40 MG: 40 INJECTION SUBCUTANEOUS at 11:20

## 2020-09-26 NOTE — PROGRESS NOTES
Pulmonary Progress Note    Subjective:   Daily Progress Note: 2020 1:58 PM    CC: From here arm diabetes with fever aspiration    HPI: 17-year-old male came in with shortness of breath, Torey Rubinstein he was COVID positive also is HIV positive    Patient in distress on oxygen nasal cannula mouth open but does not communicate  He is removing his oxygen nasal cannula  Now he is on room air saturation about 90%  Review of Systems  Unable to obtain    Objective:     Visit Vitals  BP (!) 137/99   Pulse 78   Temp 97.7 °F (36.5 °C)   Resp 18   Ht 5' 10\" (1.778 m)   Wt 84.8 kg (186 lb 15.2 oz)   SpO2 95%   BMI 26.82 kg/m²    O2 Flow Rate (L/min): 4 l/min O2 Device: Room air    Temp (24hrs), Av.3 °F (36.3 °C), Min:95.6 °F (35.3 °C), Max:98.6 °F (37 °C)      No intake/output data recorded.  1901 -  0700  In: 3012   Out: -     Visit Vitals  BP (!) 137/99   Pulse 78   Temp 97.7 °F (36.5 °C)   Resp 18   Ht 5' 10\" (1.778 m)   Wt 84.8 kg (186 lb 15.2 oz)   SpO2 95%   BMI 26.82 kg/m²     General:  Alert, cooperative, no distress, appears stated age. Head:  Normocephalic, without obvious abnormality, atraumatic. Eyes:  Conjunctivae/corneas clear. PERRL, EOMs intact. Fundi benign   Ears:  Normal TMs and external ear canals both ears. Nose: Nares normal. Septum midline. Mucosa normal. No drainage or sinus tenderness. Throat: Lips, mucosa, and tongue normal. Teeth and gums normal.   Neck: Supple, symmetrical, trachea midline, no adenopathy, thyroid: no enlargement/tenderness/nodules, no carotid bruit and no JVD. Back:   Symmetric, no curvature. ROM normal. No CVA tenderness. Lungs:   Clear to auscultation bilaterally. Chest wall:  No tenderness or deformity. Heart:  Regular rate and rhythm, S1, S2 normal, no murmur, click, rub or gallop. Abdomen:   Soft, non-tender. Bowel sounds normal. No masses,  No organomegaly. Genitalia:  Normal male without lesion, discharge or tenderness.    Rectal:  Normal tone, normal prostate, no masses or tenderness  Guaiac negative stool. Extremities: Extremities normal, atraumatic, no cyanosis or edema. Pulses: 2+ and symmetric all extremities. Skin: Skin color, texture, turgor normal. No rashes or lesions   Lymph nodes: Cervical, supraclavicular, and axillary nodes normal.               Data Review    No results found for this or any previous visit (from the past 24 hour(s)).     Current Facility-Administered Medications   Medication Dose Route Frequency    pantoprazole (PROTONIX) granules for oral suspension 40 mg  40 mg Per G Tube ACB    piperacillin-tazobactam (ZOSYN) 3.375 g in 0.9% sodium chloride (MBP/ADV) 100 mL MBP  3.375 g IntraVENous Q8H    cyanocobalamin tablet 1,000 mcg  1,000 mcg Oral DAILY    emtricitabine-tenofovir (TDF) (TRUVADA) 200-300 mg per tablet 1 Tab  1 Tab Oral DAILY    enoxaparin (LOVENOX) injection 40 mg  40 mg SubCUTAneous Q24H    predniSONE (DELTASONE) tablet 20 mg  20 mg Per G Tube DAILY    raltegravir (ISENTRESS) tablet 400 mg  400 mg Oral BID    thiamine mononitrate (B-1) tablet 50 mg  50 mg Oral DAILY    ziprasidone (GEODON) capsule 40 mg  40 mg Per G Tube BID WITH MEALS    acetaminophen (TYLENOL) suppository 650 mg  650 mg Rectal Q4H PRN    acetaminophen (TYLENOL) tablet 650 mg  650 mg Oral Q6H PRN         Assessment/Plan:     COVID-19 pneumonia  Acute  hypoxic respiratory failure resolved  HIV positive  History of transitional cell bladder cancer  Klebsiella pneumonia resolved patient is not on vancomycin or any other antibiotics  Continue to wean oxygen per protocol he is right now he is on room air saturation is about 90%   patient was treated with Zosyn Lovenox prednisone Zithromax and  Actemra  Awaiting for placement

## 2020-09-27 LAB — SARS-COV-2, COV2NT: NOT DETECTED

## 2020-09-27 PROCEDURE — 74011636637 HC RX REV CODE- 636/637: Performed by: FAMILY MEDICINE

## 2020-09-27 PROCEDURE — 74011250636 HC RX REV CODE- 250/636: Performed by: FAMILY MEDICINE

## 2020-09-27 PROCEDURE — 65270000029 HC RM PRIVATE

## 2020-09-27 PROCEDURE — 74011000258 HC RX REV CODE- 258: Performed by: INTERNAL MEDICINE

## 2020-09-27 PROCEDURE — 94762 N-INVAS EAR/PLS OXIMTRY CONT: CPT

## 2020-09-27 PROCEDURE — 74011250636 HC RX REV CODE- 250/636: Performed by: INTERNAL MEDICINE

## 2020-09-27 PROCEDURE — 74011250637 HC RX REV CODE- 250/637: Performed by: FAMILY MEDICINE

## 2020-09-27 PROCEDURE — 77010033678 HC OXYGEN DAILY

## 2020-09-27 RX ADMIN — CYANOCOBALAMIN TAB 1000 MCG 1000 MCG: 1000 TAB at 12:09

## 2020-09-27 RX ADMIN — THIAMINE HCL TAB 100 MG 50 MG: 100 TAB at 12:09

## 2020-09-27 RX ADMIN — PANTOPRAZOLE SODIUM 40 MG: 40 GRANULE, DELAYED RELEASE ORAL at 12:09

## 2020-09-27 RX ADMIN — EMTRICITABINE AND TENOFOVIR DISOPROXIL FUMARATE 1 TABLET: 200; 300 TABLET, FILM COATED ORAL at 09:00

## 2020-09-27 RX ADMIN — PIPERACILLIN SODIUM AND TAZOBACTAM SODIUM 3.38 G: 3; .375 INJECTION, POWDER, LYOPHILIZED, FOR SOLUTION INTRAVENOUS at 18:38

## 2020-09-27 RX ADMIN — ENOXAPARIN SODIUM 40 MG: 40 INJECTION SUBCUTANEOUS at 12:09

## 2020-09-27 RX ADMIN — PIPERACILLIN SODIUM AND TAZOBACTAM SODIUM 3.38 G: 3; .375 INJECTION, POWDER, LYOPHILIZED, FOR SOLUTION INTRAVENOUS at 10:24

## 2020-09-27 RX ADMIN — ZIPRASIDONE HCL 40 MG: 40 CAPSULE ORAL at 18:38

## 2020-09-27 RX ADMIN — RALTEGRAVIR 400 MG: 400 TABLET, FILM COATED ORAL at 18:38

## 2020-09-27 RX ADMIN — PREDNISONE 20 MG: 20 TABLET ORAL at 12:09

## 2020-09-27 RX ADMIN — PIPERACILLIN SODIUM AND TAZOBACTAM SODIUM 3.38 G: 3; .375 INJECTION, POWDER, LYOPHILIZED, FOR SOLUTION INTRAVENOUS at 04:26

## 2020-09-27 RX ADMIN — RALTEGRAVIR 400 MG: 400 TABLET, FILM COATED ORAL at 12:09

## 2020-09-27 RX ADMIN — ZIPRASIDONE HCL 40 MG: 40 CAPSULE ORAL at 12:09

## 2020-09-27 NOTE — PROGRESS NOTES
Problem: Risk for Spread of Infection  Goal: Prevent transmission of infectious organism to others  Description: Prevent the transmission of infectious organisms to other patients, staff members, and visitors. Outcome: Progressing Towards Goal     Problem: Patient Education:  Go to Education Activity  Goal: Patient/Family Education  Outcome: Progressing Towards Goal     Problem: Falls - Risk of  Goal: *Absence of Falls  Description: Document Landenjyoti Heard Fall Risk and appropriate interventions in the flowsheet. Outcome: Progressing Towards Goal  Note: Fall Risk Interventions:       Mentation Interventions: Bed/chair exit alarm    Medication Interventions: Bed/chair exit alarm    Elimination Interventions: Bed/chair exit alarm              Problem: Patient Education: Go to Patient Education Activity  Goal: Patient/Family Education  Outcome: Progressing Towards Goal     Problem: Pressure Injury - Risk of  Goal: *Prevention of pressure injury  Description: Document Jw Scale and appropriate interventions in the flowsheet.   Outcome: Progressing Towards Goal  Note: Pressure Injury Interventions:  Sensory Interventions: Minimize linen layers    Moisture Interventions: Absorbent underpads, Minimize layers    Activity Interventions: Assess need for specialty bed    Mobility Interventions: HOB 30 degrees or less    Nutrition Interventions: Document food/fluid/supplement intake    Friction and Shear Interventions: HOB 30 degrees or less                Problem: Patient Education: Go to Patient Education Activity  Goal: Patient/Family Education  Outcome: Progressing Towards Goal     Problem: Nutrition Deficit  Goal: *Optimize nutritional status  Outcome: Progressing Towards Goal

## 2020-09-27 NOTE — PROGRESS NOTES
Discharge Summary       PATIENT ID: Annita Mcdermott  MRN: [id number]   YOB: 1950    DATE OF ADMISSION: 8/29/2020  6:25 AM    DATE OF DISCHARGE:   PRIMARY CARE PROVIDER: Ariane Sanchez MD     ATTENDING PHYSICIAN: [address] [place] [address]  DISCHARGING PROVIDER: Cecy Villasenor      CONSULTATIONS: None    PROCEDURES/SURGERIES: * No surgery found *    ADMITTING DIAGNOSES:    Patient Active Problem List    Diagnosis Date Noted    SOB (shortness of breath) 09/12/2020       DISCHARGE DIAGNOSES / PLAN:    Acute hypoxemic respiratory failure onRA   Pneumonia with Klebsiella pneumonia Proteus mirabilis and MRSA on IV vancomycin and Zosyn  COVID-19 pneumonitis  COVID-19 not due to HIV  Severe dehydration  HIV disease  Hepatitis B  History of neurosyphilis  History of transitional cell bladder cancer  Chronic pain syndrome  Thrombocytopenia  Neutropenia  Hyponatremia  No sepsis       ADDITIONAL CARE RECOMMENDATIONS:         DISCHARGE MEDICATIONS:  Current Discharge Medication List      START taking these medications    Details   !! ziprasidone (GEODON) 40 mg capsule 1 Cap by Per G Tube route two (2) times daily (with meals). Qty: 30 Cap, Refills: 0      predniSONE (DELTASONE) 20 mg tablet 20 mg by Per G Tube route daily. Indications: polyarteritis nodosa  Qty: 30 Tab, Refills: 0      thiamine mononitrate (B-1) 100 mg tablet Take 0.5 Tabs by mouth daily. Qty: 30 Tab, Refills: 0      doxycycline (MONODOX) 100 mg capsule Take 1 Cap by mouth two (2) times a day. Qty: 20 Cap, Refills: 0       !! - Potential duplicate medications found. Please discuss with provider. CONTINUE these medications which have NOT CHANGED    Details   albuterol (PROVENTIL VENTOLIN) 2.5 mg /3 mL (0.083 %) nebu 2.5 mg by Nebulization route every two (2) hours as needed for Wheezing or Shortness of Breath. cyanocobalamin 1,000 mcg tablet Take 1,000 mcg by mouth daily.       emtricitabine-tenofovir, TDF, (TRUVADA) 200-300 mg per tablet Take 1 Tab by mouth daily. lansoprazole (PREVACID) 30 mg capsule 30 mg by Per G Tube route Daily (before breakfast). raltegravir (ISENTRESS) 400 mg tablet Take 400 mg by mouth two (2) times a day. thiamine (B-1) 50 mg tablet Take 50 mg by mouth daily. !! ziprasidone (GEODON) 40 mg capsule 40 mg by Per G Tube route two (2) times daily (with meals). !! - Potential duplicate medications found. Please discuss with provider. STOP taking these medications       acetaminophen (TYLENOL) 325 mg tablet Comments:   Reason for Stopping:         albuterol sulfate (PROVENTIL;VENTOLIN) 2.5 mg/0.5 mL nebu nebulizer solution Comments:   Reason for Stopping:         fentaNYL (DURAGESIC) 25 mcg/hr PATCH Comments:   Reason for Stopping:                 NOTIFY YOUR PHYSICIAN FOR ANY OF THE FOLLOWING:   Fever over 101 degrees for 24 hours. Chest pain, shortness of breath, fever, chills, nausea, vomiting, diarrhea, change in mentation, falling, weakness, bleeding. Severe pain or pain not relieved by medications. Or, any other signs or symptoms that you may have questions about. DISPOSITION:  x  Home With:   OT  PT  HH  RN       Long term SNF/Inpatient Rehab    Independent/assisted living    Hospice    Other:       PATIENT CONDITION AT DISCHARGE: Stable      PHYSICAL EXAMINATION AT DISCHARGE:  General:          Alert, cooperative, no distress, appears stated age. HEENT:           Atraumatic, anicteric sclerae, pink conjunctivae                          No oral ulcers, mucosa moist, throat clear, dentition fair  Neck:               Supple, symmetrical  Lungs:             Clear to auscultation bilaterally. No Wheezing or Rhonchi. No rales. Chest wall:      No tenderness  No Accessory muscle use. Heart:              Regular  rhythm,  No  murmur   No edema  Abdomen:        Soft, non-tender. Not distended. Bowel sounds normal  Extremities:     No cyanosis.   No clubbing,                            Skin turgor normal, Capillary refill normal  Skin:                Not pale. Not Jaundiced  No rashes   Psych:             Not anxious or agitated. Neurologic:      Alert, moves all extremities, answers questions appropriately and responds to commands     No orders to display        No results found for this or any previous visit (from the past 24 hour(s)).        HOSPITAL COURSE:  To present illness please refer to history and physical at the time of the admission as the patient was admitted because of acute hypoxemic respiratory failure pneumonia with Klebsiella pneumonia and MRSA patient was IV treated with IV vancomycin and Zosyn COVID-19 was positive seen by the pulmonologist and also seen by the nephrologist patient was hypoxemic for a while now patient was weaned off on room air saturation above 90% patient bedbound nonverbal active failure patient received course of IV antibiotic will discharge back to the nursing home on p.o. doxycycline      Patient resting the bed not any distress waiting for placement    Signed:   Lizzeth Hurtado MD  9/27/2020  1:09 PM

## 2020-09-28 VITALS
DIASTOLIC BLOOD PRESSURE: 87 MMHG | SYSTOLIC BLOOD PRESSURE: 144 MMHG | RESPIRATION RATE: 18 BRPM | HEART RATE: 71 BPM | BODY MASS INDEX: 25.98 KG/M2 | WEIGHT: 181.44 LBS | HEIGHT: 70 IN | TEMPERATURE: 97.7 F | OXYGEN SATURATION: 95 %

## 2020-09-28 PROCEDURE — 74011250637 HC RX REV CODE- 250/637: Performed by: FAMILY MEDICINE

## 2020-09-28 PROCEDURE — 74011250636 HC RX REV CODE- 250/636: Performed by: INTERNAL MEDICINE

## 2020-09-28 PROCEDURE — 74011000258 HC RX REV CODE- 258: Performed by: INTERNAL MEDICINE

## 2020-09-28 PROCEDURE — 74011250636 HC RX REV CODE- 250/636: Performed by: FAMILY MEDICINE

## 2020-09-28 PROCEDURE — 74011636637 HC RX REV CODE- 636/637: Performed by: FAMILY MEDICINE

## 2020-09-28 RX ADMIN — THIAMINE HCL TAB 100 MG 50 MG: 100 TAB at 10:11

## 2020-09-28 RX ADMIN — ZIPRASIDONE HCL 40 MG: 40 CAPSULE ORAL at 10:11

## 2020-09-28 RX ADMIN — PANTOPRAZOLE SODIUM 40 MG: 40 GRANULE, DELAYED RELEASE ORAL at 10:11

## 2020-09-28 RX ADMIN — CYANOCOBALAMIN TAB 1000 MCG 1000 MCG: 1000 TAB at 10:11

## 2020-09-28 RX ADMIN — PREDNISONE 20 MG: 20 TABLET ORAL at 10:11

## 2020-09-28 RX ADMIN — RALTEGRAVIR 400 MG: 400 TABLET, FILM COATED ORAL at 10:11

## 2020-09-28 RX ADMIN — PIPERACILLIN SODIUM AND TAZOBACTAM SODIUM 3.38 G: 3; .375 INJECTION, POWDER, LYOPHILIZED, FOR SOLUTION INTRAVENOUS at 03:00

## 2020-09-28 RX ADMIN — ENOXAPARIN SODIUM 40 MG: 40 INJECTION SUBCUTANEOUS at 10:11

## 2020-09-28 NOTE — DISCHARGE INSTRUCTIONS
Patient Education        Shortness of Breath: Care Instructions  Your Care Instructions     Shortness of breath has many causes. Sometimes conditions such as anxiety can lead to shortness of breath. Some people get mild shortness of breath when they exercise. Trouble breathing also can be a symptom of a serious problem, such as asthma, lung disease, emphysema, heart problems, and pneumonia. If your shortness of breath continues, you may need tests and treatment. Watch for any changes in your breathing and other symptoms. Follow-up care is a key part of your treatment and safety. Be sure to make and go to all appointments, and call your doctor if you are having problems. It's also a good idea to know your test results and keep a list of the medicines you take. How can you care for yourself at home? · Do not smoke or allow others to smoke around you. If you need help quitting, talk to your doctor about stop-smoking programs and medicines. These can increase your chances of quitting for good. · Get plenty of rest and sleep. · Take your medicines exactly as prescribed. Call your doctor if you think you are having a problem with your medicine. · Find healthy ways to deal with stress. ? Exercise daily. ? Get plenty of sleep. ? Eat regularly and well. When should you call for help? Call 911 anytime you think you may need emergency care. For example, call if:    · You have severe shortness of breath.     · You have symptoms of a heart attack. These may include:  ? Chest pain or pressure, or a strange feeling in the chest.  ? Sweating. ? Shortness of breath. ? Nausea or vomiting. ? Pain, pressure, or a strange feeling in the back, neck, jaw, or upper belly or in one or both shoulders or arms. ? Lightheadedness or sudden weakness. ? A fast or irregular heartbeat. After you call 911, the  may tell you to chew 1 adult-strength or 2 to 4 low-dose aspirin. Wait for an ambulance.  Do not try to drive yourself. Call your doctor now or seek immediate medical care if:    · Your shortness of breath gets worse or you start to wheeze. Wheezing is a high-pitched sound when you breathe.     · You wake up at night out of breath or have to prop your head up on several pillows to breathe.     · You are short of breath after only light activity or while at rest.   Watch closely for changes in your health, and be sure to contact your doctor if:    · You do not get better over the next 1 to 2 days. Where can you learn more? Go to http://natty-walker.info/  Enter S780 in the search box to learn more about \"Shortness of Breath: Care Instructions. \"  Current as of: February 24, 2020               Content Version: 12.6  © 1414-8995 [employer]. Care instructions adapted under license by [employer] (which disclaims liability or warranty for this information). If you have questions about a medical condition or this instruction, always ask your healthcare professional. [place] [address] any warranty or liability for your use of this information. Patient Education   Patient Education   Patient Education   Thiamine (Vitamin B-1) (By mouth)   Thiamine (THYE-a-min)  Thiamine, another name for Vitamin B-1, is used to treat thiamine-deficiency (not enough thiamine in the body). Brand Name(s): [employer] Blend Vitamin B-1, [employer]imum Vitamin B-1, [employer] Vitamin B-1, [place] [employer] B1   There may be other brand names for this medicine. When This Medicine Should Not Be Used: You should not use this medicine if you have had an allergic reaction to thiamine (Vitamin B-1). How to Use This Medicine:   Tablet, Capsule, Liquid  · Your doctor will tell you how much and when to take your medicine. Keep all medicine away from children. · You may take your medicine with or without food.   · Measure your oral liquid medicine using a marked measuring spoon or medicine cup.  If a dose is missed:   · Take the missed dose as soon as possible. · Skip the missed dose if it is almost time for your next dose. · You should not use two doses at the same time. · Missing a dose is generally not a cause for concern. How to Store and Dispose of This Medicine:   · Store the tablets at room temperature in a closed container. Keep away from heat, moisture, and direct light. · Store the oral liquid at room temperature, away from heat and light. Do not freeze. Drugs and Foods to Avoid:   Ask your doctor or pharmacist before using any other medicine, including over-the-counter medicines, vitamins, and herbal products. · Follow your doctor's orders if he or she has given you a special diet. Warnings While Using This Medicine:   · Tell your doctor if you are pregnant or breastfeeding, or if you become pregnant. Possible Side Effects While Using This Medicine:   Call your doctor right away if you notice any of these side effects:  · Itching or trouble breathing  · Swelling of face, lips or eyelids  If you notice other side effects that you think are caused by this medicine, tell your doctor. Call your doctor for medical advice about side effects. You may report side effects to FDA at 7-086-FDA-9740  © 2017 [facility] Information is for End User's use only and may not be sold, redistributed or otherwise used for commercial purposes. The above information is an  only. It is not intended as medical advice for individual conditions or treatments. Talk to your doctor, nurse or pharmacist before following any medical regimen to see if it is safe and effective for you. Prednisone (By mouth)   Prednisone (PRED-ni-sone)  Treats many diseases and conditions, especially problems related to inflammation. This medicine is a corticosteroid.    Brand Name(s): Contrast Allergy PreMed Pack, Marie, predniSONE Intensol   There may be other brand names for this medicine. When This Medicine Should Not Be Used: This medicine is not right for everyone. Do not use if you had an allergic reaction to prednisone or if you are pregnant. How to Use This Medicine:   Liquid, Tablet, Delayed Release Tablet  · Take your medicine as directed. Your dose may need to be changed several times to find what works best for you. · It is best to take this medicine with food or milk. · Swallow the delayed-release tablet whole. Do not crush, break, or chew it. · Measure the oral liquid medicine with a marked measuring spoon, oral syringe, or medicine cup. · Missed dose: Take a dose as soon as you remember. If it is almost time for your next dose, wait until then and take a regular dose. Do not take extra medicine to make up for a missed dose. · Store the medicine in a closed container at room temperature, away from heat, moisture, and direct light. Do not freeze the oral liquid. Drugs and Foods to Avoid:   Ask your doctor or pharmacist before using any other medicine, including over-the-counter medicines, vitamins, and herbal products. · Tell your doctor if you use any of the following:  ¨ Aminoglutethimide, amphotericin B, carbamazepine, cholestyramine, cyclosporine, digoxin, isoniazid, ketoconazole, phenobarbital, phenytoin, or rifampin  ¨ Blood thinner, such as warfarin  ¨ NSAID pain or arthritis medicine, such as aspirin, diclofenac, ibuprofen, naproxen, celecoxib  ¨ Diuretic (water pill)  ¨ Diabetes medicine  ¨ Macrolide antibiotic, such as azithromycin, clarithromycin, erythromycin  ¨ Estrogen, including birth control pills or hormone replacement therapy  · This medicine may interfere with vaccines. Ask your doctor before you get a flu shot or any other vaccines. Warnings While Using This Medicine:   · It is not safe to take this medicine during pregnancy. It could harm an unborn baby. Tell your doctor right away if you become pregnant.   · Tell your doctor if you are breastfeeding or if you have kidney problems, heart failure, high blood pressure, a recent heart attack, diabetes, glaucoma, osteoporosis, or thyroid problems. Tell your doctor about any infection you have. Also tell your doctor if you have had mental or emotional problems (such as depression) or stomach or bowel problems (such as an ulcer or diverticulitis). · This medicine may cause the following problems:  ¨ Mood or behavior changes  ¨ Higher blood pressure, retaining water, changes in salt or potassium levels in your body  ¨ Cataracts or glaucoma (with long-term use)  ¨ Weak bones or osteoporosis (with long-term use)  ¨ Slow growth in children (with long-term use)  ¨ Muscle problems (with high doses, especially if you have myasthenia gravis or similar nerve and muscle problems)  · Do not stop using this medicine suddenly. Your doctor will need to slowly decrease your dose before you stop it completely. · This medicine could cause you to get infections more easily. Tell your doctor right away if you are exposed to chicken pox, measles, or other serious infection. Tell your doctor if you had a serious infection in the past, such as tuberculosis or herpes. · Tell your doctor about any extra stress or anxiety in your life. Your dose might need to be changed for a short time. · Tell any doctor or dentist who treats you that you are using this medicine. This medicine may affect certain medical test results. · Keep all medicine out of the reach of children. Never share your medicine with anyone.   Possible Side Effects While Using This Medicine:   Call your doctor right away if you notice any of these side effects:  · Allergic reaction: Itching or hives, swelling in your face or hands, swelling or tingling in your mouth or throat, chest tightness, trouble breathing  · Dark freckles, skin color changes, coldness, weakness, tiredness, nausea, vomiting, weight loss  · Depression, unusual thoughts, feelings, or behaviors, trouble sleeping  · Fever, chills, cough, sore throat, and body aches  · Muscle pain or weakness  · Rapid weight gain, swelling in your hands, ankles, or feet  · Severe stomach pain, nausea, vomiting, or red or black stools  · Skin changes or growths  · Trouble seeing, eye pain, headache  If you notice these less serious side effects, talk with your doctor:   · Increased appetite  · Round, puffy face  · Weight gain around your neck, upper back, breast, face, or waist  If you notice other side effects that you think are caused by this medicine, tell your doctor. Call your doctor for medical advice about side effects. You may report side effects to FDA at 0-084-LTJ-4612  © 2017 [facility] Information is for End User's use only and may not be sold, redistributed or otherwise used for commercial purposes. The above information is an  only. It is not intended as medical advice for individual conditions or treatments. Talk to your doctor, nurse or pharmacist before following any medical regimen to see if it is safe and effective for you. Doxycycline (By mouth)   Doxycycline (kai-m-TPH-kleen)  Treats and prevents infections. Also used to prevent malaria and treat rosacea or severe acne. This medicine is a tetracycline antibiotic. Brand Name(s): Acticlate, Adoxa, Adoxa Sergey 1/150, Avidoxy, Avidoxy DK, BenzoDox 30 Kit, BenzoDox 60 Kit, Doryx, Doryx MPC, Monodox, Morgidox 7P125SY, Morgidox 9w894SE Kit, Morgidox 1x50MG, Morgidox 1x50MG Kit, Morgidox [id number]   There may be other brand names for this medicine. When This Medicine Should Not Be Used: This medicine is not right for everyone. Do not use it if you had an allergic reaction to doxycycline or another tetracycline antibiotic, or if you are pregnant or breastfeeding.   How to Use This Medicine:   Capsule, Delayed Release Capsule, Long Acting Capsule, Liquid, Tablet, Delayed Release Tablet  · Your doctor will tell you how much medicine to use. Do not use more than directed. · Ask your pharmacist or doctor if you need to take this medicine with or without food. Some forms can be taken with food or milk, but others must be taken on an empty stomach. · Oracea® capsules: This medicine must be taken on an empty stomach, at least 1 hour before or 2 hours after a meal.  · Capsule: Swallow whole. Do not break, crush, chew, or open it. · Delayed-release tablets: You may also take this medicine by sprinkling the broken tablets onto room-temperature applesauce. Swallow this mixture right away. Do not chew it. Do not store the mixture for later use. You may take this medicine with food or milk to avoid stomach upset. · Oral liquid: Shake the bottle well just before each use. Measure the oral liquid medicine with a marked measuring spoon, oral syringe, or medicine cup. · Tablets: You may take this medicine with food or milk to avoid stomach irritation. To break a tablet, hold the tablet between your thumb and index fingers close to the appropriate scored line. Then, apply enough pressure to snap the tablet segments apart. Do not use the tablet if it does not break on the scored lines. · Take all of the medicine in your prescription to clear up your infection, even if you feel better after the first few doses. · Drink plenty of fluids to avoid throat problems, if you take the capsule or tablet form. · Malaria prevention: Start taking the medicine 1 or 2 days before you travel. Take the medicine every day during your trip. Keep taking it for 4 weeks after you return. However, do not use the medicine for longer than 4 months. · Do not use this medicine for more than 9 months if you are using it for rosacea. · Use only the brand of medicine your doctor prescribed. Other brands may not work the same way. · Read and follow the patient instructions that come with this medicine.  Talk to your doctor or pharmacist if you have any questions. · Missed dose: Take a dose as soon as you remember. If it is almost time for your next dose, wait until then and take a regular dose. Do not take extra medicine to make up for a missed dose. · Store the medicine in a closed container at room temperature, away from heat, moisture, and direct light. Do not freeze the oral liquid. Drugs and Foods to Avoid:   Ask your doctor or pharmacist before using any other medicine, including over-the-counter medicines, vitamins, and herbal products. · Some medicines can affect how doxycycline works. Tell your doctor if you are using any of the following:  ¨ Bismuth subsalicylate  ¨ Acne medicines (including isotretinoin)  ¨ Birth control pills  ¨ Blood thinner (including warfarin)  ¨ Medicine for seizures (including carbamazepine, phenobarbital, phenytoin)  ¨ Medicine that contains aluminum, calcium, or iron (including an antacid or vitamin supplement)  ¨ Medicine to treat psoriasis (including acitretin)  ¨ Penicillin antibiotic  ¨ Stomach medicine  Warnings While Using This Medicine:   · This medicine may cause birth defects if either partner is using it during conception or pregnancy. Tell your doctor right away if you or your partner becomes pregnant. Birth control pills may not work as well when used with this medicine. Use a second form of birth control to keep from getting pregnant. · Tell your doctor if you have kidney disease, liver disease, asthma, or an allergy to sulfites. Tell your doctor if you had surgery on your stomach, or if you have a history of yeast infections. · This medicine may cause the following problems:  ¨ Permanent change in tooth color (in children younger than 6years old)  ¨ Increased pressure inside the head  ¨ Yeast infection  ¨ Immune system problems  · This medicine can cause diarrhea. Call your doctor if the diarrhea becomes severe, does not stop, or is bloody.  Do not take any medicine to stop diarrhea until you have talked to your doctor. Diarrhea can occur 2 months or more after you stop taking this medicine. · This medicine may make your skin more sensitive to sunlight. Wear sunscreen. Do not use sunlamps or tanning beds. · Tell any doctor or dentist who treats you that you are using this medicine. This medicine may affect certain medical test results. · Call your doctor if your symptoms do not improve or if they get worse. · Your doctor will do lab tests at regular visits to check on the effects of this medicine. Keep all appointments. · Keep all medicine out of the reach of children. Never share your medicine with anyone. Possible Side Effects While Using This Medicine:   Call your doctor right away if you notice any of these side effects:  · Allergic reaction: Itching or hives, swelling in your face or hands, swelling or tingling in your mouth or throat, chest tightness, trouble breathing  · Blistering, peeling, red skin rash  · Burning, pain, or irritation in your upper stomach or throat  · Diarrhea that may contain blood  · Fever, chills, cough, runny or stuffy nose, sore throat, body aches  · Joint pain, fever, rash, and unusual tiredness or weakness  · Severe headache, dizziness, vision changes  · Sudden and severe stomach pain, nausea, vomiting, lightheadedness  If you notice these less serious side effects, talk with your doctor:   · Darkening of your skin, scars, teeth, or gums  · Sores or white patches on your lips, mouth, or throat  If you notice other side effects that you think are caused by this medicine, tell your doctor. Call your doctor for medical advice about side effects. You may report side effects to FDA at 3-381-FDA-4926  © 2017 [facility] Information is for End User's use only and may not be sold, redistributed or otherwise used for commercial purposes. The above information is an  only. It is not intended as medical advice for individual conditions or treatments.  Talk to your doctor, nurse or pharmacist before following any medical regimen to see if it is safe and effective for you.

## 2020-09-28 NOTE — PROGRESS NOTES
Discharge Summary       PATIENT ID: Natan Sharma  MRN: [id number]   YOB: 1950    DATE OF ADMISSION: 8/29/2020  6:25 AM    DATE OF DISCHARGE:   PRIMARY CARE PROVIDER: Yael Langley MD     ATTENDING PHYSICIAN: Dona Gutierrez  DISCHARGING PROVIDER: Juanita Villasenor      CONSULTATIONS: None    PROCEDURES/SURGERIES: * No surgery found *    ADMITTING DIAGNOSES:    Patient Active Problem List    Diagnosis Date Noted    SOB (shortness of breath) 09/12/2020       DISCHARGE DIAGNOSES / PLAN:    Acute hypoxemic respiratory failure onRA   Pneumonia with Klebsiella pneumonia Proteus mirabilis and MRSA on IV vancomycin and Zosyn  COVID-19 pneumonitis  COVID-19 not due to HIV  Severe dehydration  HIV disease  Hepatitis B  History of neurosyphilis  History of transitional cell bladder cancer  Chronic pain syndrome  Thrombocytopenia  Neutropenia  Hyponatremia  No sepsis       ADDITIONAL CARE RECOMMENDATIONS:         DISCHARGE MEDICATIONS:  Current Discharge Medication List      START taking these medications    Details   !! ziprasidone (GEODON) 40 mg capsule 1 Cap by Per G Tube route two (2) times daily (with meals). Qty: 30 Cap, Refills: 0      predniSONE (DELTASONE) 20 mg tablet 20 mg by Per G Tube route daily. Indications: polyarteritis nodosa  Qty: 30 Tab, Refills: 0      thiamine mononitrate (B-1) 100 mg tablet Take 0.5 Tabs by mouth daily. Qty: 30 Tab, Refills: 0      doxycycline (MONODOX) 100 mg capsule Take 1 Cap by mouth two (2) times a day. Qty: 20 Cap, Refills: 0       !! - Potential duplicate medications found. Please discuss with provider. CONTINUE these medications which have NOT CHANGED    Details   albuterol (PROVENTIL VENTOLIN) 2.5 mg /3 mL (0.083 %) nebu 2.5 mg by Nebulization route every two (2) hours as needed for Wheezing or Shortness of Breath. cyanocobalamin 1,000 mcg tablet Take 1,000 mcg by mouth daily.       emtricitabine-tenofovir, TDF, (TRUVADA) 200-300 mg per tablet Take 1 Tab by mouth daily. lansoprazole (PREVACID) 30 mg capsule 30 mg by Per G Tube route Daily (before breakfast). raltegravir (ISENTRESS) 400 mg tablet Take 400 mg by mouth two (2) times a day. thiamine (B-1) 50 mg tablet Take 50 mg by mouth daily. !! ziprasidone (GEODON) 40 mg capsule 40 mg by Per G Tube route two (2) times daily (with meals). !! - Potential duplicate medications found. Please discuss with provider. STOP taking these medications       acetaminophen (TYLENOL) 325 mg tablet Comments:   Reason for Stopping:         albuterol sulfate (PROVENTIL;VENTOLIN) 2.5 mg/0.5 mL nebu nebulizer solution Comments:   Reason for Stopping:         fentaNYL (DURAGESIC) 25 mcg/hr PATCH Comments:   Reason for Stopping:                 NOTIFY YOUR PHYSICIAN FOR ANY OF THE FOLLOWING:   Fever over 101 degrees for 24 hours. Chest pain, shortness of breath, fever, chills, nausea, vomiting, diarrhea, change in mentation, falling, weakness, bleeding. Severe pain or pain not relieved by medications. Or, any other signs or symptoms that you may have questions about. DISPOSITION:  x  Home With:   OT  PT  HH  RN       Long term SNF/Inpatient Rehab    Independent/assisted living    Hospice    Other:       PATIENT CONDITION AT DISCHARGE: Stable      PHYSICAL EXAMINATION AT DISCHARGE:  General:          Alert, cooperative, no distress, appears stated age. HEENT:           Atraumatic, anicteric sclerae, pink conjunctivae                          No oral ulcers, mucosa moist, throat clear, dentition fair  Neck:               Supple, symmetrical  Lungs:             Clear to auscultation bilaterally. No Wheezing or Rhonchi. No rales. Chest wall:      No tenderness  No Accessory muscle use. Heart:              Regular  rhythm,  No  murmur   No edema  Abdomen:        Soft, non-tender. Not distended. Bowel sounds normal  Extremities:     No cyanosis.   No clubbing,                            Skin turgor normal, Capillary refill normal  Skin:                Not pale. Not Jaundiced  No rashes   Psych:             Not anxious or agitated. Neurologic:      Alert, moves all extremities, answers questions appropriately and responds to commands     No orders to display        No results found for this or any previous visit (from the past 24 hour(s)).        HOSPITAL COURSE:  To present illness please refer to history and physical at the time of the admission as the patient was admitted because of acute hypoxemic respiratory failure pneumonia with Klebsiella pneumonia and MRSA patient was IV treated with IV vancomycin and Zosyn COVID-19 was positive seen by the pulmonologist and also seen by the nephrologist patient was hypoxemic for a while now patient was weaned off on room air saturation above 90% patient bedbound nonverbal active failure patient received course of IV antibiotic will discharge back to the nursing home on p.o. doxycycline      Patient resting the bed not any distress waiting for placement    Patient is going to be discharged to [facility] today  Tried to call this physician to get the report no answer      Signed:   Jana Song MD  9/28/2020  1:09 PM

## 2020-09-28 NOTE — PROGRESS NOTES
Discharge order received. Vital signs stable. IV access discontinued, tip intact, hemostasis achieved. Condom cath discontinued. No telemetry present. Pt unable to understand discharge instructions. Medical transportation arranged. Report called to [facility].

## 2020-09-28 NOTE — PROGRESS NOTES
CM called [facility] [phone]. Call was transferred. Cm spoke with Mrs. Radha Workman. Cm informed her pt has two negative COVID results. Cm asked if pt can return today. Mrs. Radha Workman confirmed pt can return. Mrs. Radha Workman plans to notify their doctor of pt's return. Cm will need to fax information to fax# [phone]. Pt will need to be at [facility] by noon. [facility] does not provide transportation. Nurse report to call: [phone]. Cm spoke with pt's primary nurse. CM faxed dc information to [facility]. Cm was informed Dr. Geneva Monahan at [facility] needs to speak with Dr. Paola Mcgovern to obtain an update on pt. Cm spoke with Dr. Paola Mcgovern and provided him with Dr. Heron Reed phone number [phone] so he can provide her with an update. Dr. Paola Mcgovern tried to call Dr. Geneva Monahan for an update, however Dr. Geneva Monahan did not answer. Writer tried to call Dr. Geneva Monahan as well. Cm was not able to get in touch with her. CM spoke with Mrs. Radha Workman at [facility] and informed her Dr. Paola Mcgovern and writer tried to call Dr. Geneva Monahan, but she did not answer the phone. Cm informed Mrs. Radha Workman the pt is on his way back to [facility].

## 2020-09-28 NOTE — PROGRESS NOTES
Bedside shift change report given to Belinda Samaniego RN (oncoming nurse) by Christen Hancock RN (offgoing nurse). Report included the following information SBAR.

## 2020-09-28 NOTE — PROGRESS NOTES
Problem: Risk for Spread of Infection  Goal: Prevent transmission of infectious organism to others  Description: Prevent the transmission of infectious organisms to other patients, staff members, and visitors. Outcome: Progressing Towards Goal     Problem: Patient Education:  Go to Education Activity  Goal: Patient/Family Education  Outcome: Progressing Towards Goal     Problem: Falls - Risk of  Goal: *Absence of Falls  Description: Document Ralfcheryle Hunter Fall Risk and appropriate interventions in the flowsheet.   Outcome: Progressing Towards Goal  Note: Fall Risk Interventions:       Mentation Interventions: Bed/chair exit alarm    Medication Interventions: Bed/chair exit alarm    Elimination Interventions: Bed/chair exit alarm

## 2020-10-28 ENCOUNTER — HOSPITAL ENCOUNTER (INPATIENT)
Age: 70
LOS: 4 days | Discharge: NURSING, REHAB OR LONG-TERM CARE | DRG: 137 | End: 2020-11-02
Attending: EMERGENCY MEDICINE | Admitting: HOSPITALIST
Payer: MEDICAID

## 2020-10-28 ENCOUNTER — APPOINTMENT (OUTPATIENT)
Dept: GENERAL RADIOLOGY | Age: 70
DRG: 137 | End: 2020-10-28
Attending: EMERGENCY MEDICINE
Payer: MEDICAID

## 2020-10-28 DIAGNOSIS — J69.0 ASPIRATION PNEUMONIA, UNSPECIFIED ASPIRATION PNEUMONIA TYPE, UNSPECIFIED LATERALITY, UNSPECIFIED PART OF LUNG (HCC): ICD-10-CM

## 2020-10-28 DIAGNOSIS — Z21 ASYMPTOMATIC HIV INFECTION (HCC): ICD-10-CM

## 2020-10-28 DIAGNOSIS — J96.01 ACUTE RESPIRATORY FAILURE WITH HYPOXIA (HCC): Primary | ICD-10-CM

## 2020-10-28 DIAGNOSIS — B18.2 HEP C W/O COMA, CHRONIC (HCC): ICD-10-CM

## 2020-10-28 DIAGNOSIS — Z20.822 SUSPECTED COVID-19 VIRUS INFECTION: ICD-10-CM

## 2020-10-28 LAB
ALBUMIN SERPL-MCNC: 3.1 G/DL (ref 3.5–5)
ALBUMIN/GLOB SERPL: 0.7 {RATIO} (ref 1.1–2.2)
ALP SERPL-CCNC: 69 U/L (ref 45–117)
ALT SERPL-CCNC: 22 U/L (ref 12–78)
ANION GAP SERPL CALC-SCNC: 9 MMOL/L (ref 5–15)
APPEARANCE UR: CLEAR
AST SERPL W P-5'-P-CCNC: 16 U/L (ref 15–37)
BACTERIA URNS QL MICRO: NEGATIVE /HPF
BASOPHILS # BLD: 0 K/UL (ref 0–0.1)
BASOPHILS NFR BLD: 0 % (ref 0–1)
BILIRUB SERPL-MCNC: 0.6 MG/DL (ref 0.2–1)
BILIRUB UR QL: NEGATIVE
BNP SERPL-MCNC: 155 PG/ML
BUN SERPL-MCNC: 18 MG/DL (ref 6–20)
BUN/CREAT SERPL: 18 (ref 12–20)
CA-I BLD-MCNC: 8.9 MG/DL (ref 8.5–10.1)
CHLORIDE SERPL-SCNC: 113 MMOL/L (ref 97–108)
CO2 SERPL-SCNC: 26 MMOL/L (ref 21–32)
COLOR UR: ABNORMAL
CREAT SERPL-MCNC: 0.98 MG/DL (ref 0.7–1.3)
DIFFERENTIAL METHOD BLD: ABNORMAL
EOSINOPHIL # BLD: 0 K/UL (ref 0–0.4)
EOSINOPHIL NFR BLD: 0 % (ref 0–7)
ERYTHROCYTE [DISTWIDTH] IN BLOOD BY AUTOMATED COUNT: 13.1 % (ref 11.5–14.5)
GLOBULIN SER CALC-MCNC: 4.4 G/DL (ref 2–4)
GLUCOSE SERPL-MCNC: 102 MG/DL (ref 65–100)
GLUCOSE UR STRIP.AUTO-MCNC: NEGATIVE MG/DL
HCT VFR BLD AUTO: 42.9 % (ref 36.6–50.3)
HGB BLD-MCNC: 14.6 G/DL (ref 12.1–17)
HGB UR QL STRIP: NEGATIVE
IMM GRANULOCYTES # BLD AUTO: 0.1 K/UL (ref 0–0.04)
IMM GRANULOCYTES NFR BLD AUTO: 0 % (ref 0–0.5)
KETONES UR QL STRIP.AUTO: NEGATIVE MG/DL
LACTATE SERPL-SCNC: 1.6 MMOL/L (ref 0.4–2)
LEUKOCYTE ESTERASE UR QL STRIP.AUTO: NEGATIVE
LYMPHOCYTES # BLD: 1.6 K/UL (ref 0.8–3.5)
LYMPHOCYTES NFR BLD: 10 % (ref 12–49)
MCH RBC QN AUTO: 31.9 PG (ref 26–34)
MCHC RBC AUTO-ENTMCNC: 34 G/DL (ref 30–36.5)
MCV RBC AUTO: 93.9 FL (ref 80–99)
MONOCYTES # BLD: 0.9 K/UL (ref 0–1)
MONOCYTES NFR BLD: 5 % (ref 5–13)
MUCOUS THREADS URNS QL MICRO: ABNORMAL /LPF
NEUTS SEG # BLD: 13.4 K/UL (ref 1.8–8)
NEUTS SEG NFR BLD: 85 % (ref 32–75)
NITRITE UR QL STRIP.AUTO: NEGATIVE
PH UR STRIP: 7 [PH] (ref 5–8)
PLATELET # BLD AUTO: 157 K/UL (ref 150–400)
PMV BLD AUTO: 11 FL (ref 8.9–12.9)
POTASSIUM SERPL-SCNC: 3.6 MMOL/L (ref 3.5–5.1)
PROT SERPL-MCNC: 7.5 G/DL (ref 6.4–8.2)
PROT UR STRIP-MCNC: NEGATIVE MG/DL
RBC # BLD AUTO: 4.57 M/UL (ref 4.1–5.7)
RBC #/AREA URNS HPF: ABNORMAL /HPF (ref 0–5)
SODIUM SERPL-SCNC: 148 MMOL/L (ref 136–145)
SP GR UR REFRACTOMETRY: 1.01 (ref 1–1.03)
TROPONIN I SERPL-MCNC: <0.05 NG/ML
UA: UC IF INDICATED,UAUC: ABNORMAL
UROBILINOGEN UR QL STRIP.AUTO: 4 EU/DL (ref 0.1–1)
WBC # BLD AUTO: 15.9 K/UL (ref 4.1–11.1)
WBC URNS QL MICRO: ABNORMAL /HPF (ref 0–4)

## 2020-10-28 PROCEDURE — 83605 ASSAY OF LACTIC ACID: CPT

## 2020-10-28 PROCEDURE — 74011250636 HC RX REV CODE- 250/636: Performed by: EMERGENCY MEDICINE

## 2020-10-28 PROCEDURE — 82272 OCCULT BLD FECES 1-3 TESTS: CPT

## 2020-10-28 PROCEDURE — 93005 ELECTROCARDIOGRAM TRACING: CPT

## 2020-10-28 PROCEDURE — 99285 EMERGENCY DEPT VISIT HI MDM: CPT

## 2020-10-28 PROCEDURE — 96365 THER/PROPH/DIAG IV INF INIT: CPT

## 2020-10-28 PROCEDURE — 36415 COLL VENOUS BLD VENIPUNCTURE: CPT

## 2020-10-28 PROCEDURE — 84484 ASSAY OF TROPONIN QUANT: CPT

## 2020-10-28 PROCEDURE — 86900 BLOOD TYPING SEROLOGIC ABO: CPT

## 2020-10-28 PROCEDURE — 74011000258 HC RX REV CODE- 258: Performed by: EMERGENCY MEDICINE

## 2020-10-28 PROCEDURE — 83880 ASSAY OF NATRIURETIC PEPTIDE: CPT

## 2020-10-28 PROCEDURE — 80053 COMPREHEN METABOLIC PANEL: CPT

## 2020-10-28 PROCEDURE — 81001 URINALYSIS AUTO W/SCOPE: CPT

## 2020-10-28 PROCEDURE — 74011250637 HC RX REV CODE- 250/637: Performed by: EMERGENCY MEDICINE

## 2020-10-28 PROCEDURE — 96375 TX/PRO/DX INJ NEW DRUG ADDON: CPT

## 2020-10-28 PROCEDURE — 85025 COMPLETE CBC W/AUTO DIFF WBC: CPT

## 2020-10-28 PROCEDURE — C9113 INJ PANTOPRAZOLE SODIUM, VIA: HCPCS | Performed by: EMERGENCY MEDICINE

## 2020-10-28 PROCEDURE — 87040 BLOOD CULTURE FOR BACTERIA: CPT

## 2020-10-28 PROCEDURE — 71045 X-RAY EXAM CHEST 1 VIEW: CPT

## 2020-10-28 RX ORDER — ACETAMINOPHEN 650 MG/1
650 SUPPOSITORY RECTAL
Status: COMPLETED | OUTPATIENT
Start: 2020-10-28 | End: 2020-10-28

## 2020-10-28 RX ADMIN — PIPERACILLIN AND TAZOBACTAM 3.38 G: 3; .375 INJECTION, POWDER, LYOPHILIZED, FOR SOLUTION INTRAVENOUS at 22:54

## 2020-10-28 RX ADMIN — SODIUM CHLORIDE 1000 ML: 9 INJECTION, SOLUTION INTRAVENOUS at 23:06

## 2020-10-28 RX ADMIN — ACETAMINOPHEN 650 MG: 650 SUPPOSITORY RECTAL at 22:54

## 2020-10-28 RX ADMIN — PANTOPRAZOLE SODIUM 40 MG: 40 INJECTION, POWDER, FOR SOLUTION INTRAVENOUS at 22:54

## 2020-10-28 NOTE — ED TRIAGE NOTES
Pt from [facility]. Reported fever and vomiting coffee ground emesis. Hx asp pneu. Pt nonverbal. sats initially in 80s on room air. NRB applied by ems.  sats improved to 99%

## 2020-10-29 PROBLEM — J18.9 PNEUMONIA: Status: ACTIVE | Noted: 2020-10-29

## 2020-10-29 LAB
ABO + RH BLD: NORMAL
ARTERIAL PATENCY WRIST A: ABNORMAL
ATRIAL RATE: 102 BPM
BASE EXCESS BLDA CALC-SCNC: 2.8 MMOL/L (ref 0–2)
BDY SITE: ABNORMAL
BLOOD GROUP ANTIBODIES SERPL: NEGATIVE
CALCULATED P AXIS, ECG09: 56 DEGREES
CALCULATED R AXIS, ECG10: -14 DEGREES
CALCULATED T AXIS, ECG11: 46 DEGREES
COLLECT DATE STL: POSITIVE
DIAGNOSIS, 93000: NORMAL
EPAP/CPAP/PEEP, PAPEEP: 0
GAS FLOW.O2 O2 DELIVERY SYS: 5 L/MIN
HCO3 BLDA-SCNC: 27 MMOL/L (ref 22–26)
HEMOCCULT SP1 STL QL: POSITIVE
P-R INTERVAL, ECG05: 148 MS
PCO2 BLDA: 41 MMHG (ref 35–45)
PH BLDA: 7.43 [PH] (ref 7.35–7.45)
PO2 BLDA: 145 MMHG (ref 75–100)
Q-T INTERVAL, ECG07: 338 MS
QRS DURATION, ECG06: 76 MS
QTC CALCULATION (BEZET), ECG08: 440 MS
SAO2 % BLD: 100 %
SAO2% DEVICE SAO2% SENSOR NAME: ABNORMAL
SARS-COV-2, COV2: NORMAL
SPECIMEN EXP DATE BLD: NORMAL
VENTRICULAR RATE, ECG03: 102 BPM

## 2020-10-29 PROCEDURE — 96366 THER/PROPH/DIAG IV INF ADDON: CPT

## 2020-10-29 PROCEDURE — 87635 SARS-COV-2 COVID-19 AMP PRB: CPT

## 2020-10-29 PROCEDURE — 74011250637 HC RX REV CODE- 250/637: Performed by: HOSPITALIST

## 2020-10-29 PROCEDURE — 99221 1ST HOSP IP/OBS SF/LOW 40: CPT | Performed by: INTERNAL MEDICINE

## 2020-10-29 PROCEDURE — 74011250636 HC RX REV CODE- 250/636: Performed by: HOSPITALIST

## 2020-10-29 PROCEDURE — 82803 BLOOD GASES ANY COMBINATION: CPT

## 2020-10-29 PROCEDURE — 65270000029 HC RM PRIVATE

## 2020-10-29 PROCEDURE — 51702 INSERT TEMP BLADDER CATH: CPT

## 2020-10-29 PROCEDURE — 74011000258 HC RX REV CODE- 258: Performed by: HOSPITALIST

## 2020-10-29 PROCEDURE — 36415 COLL VENOUS BLD VENIPUNCTURE: CPT

## 2020-10-29 RX ORDER — SODIUM CHLORIDE 0.9 % (FLUSH) 0.9 %
5-40 SYRINGE (ML) INJECTION EVERY 8 HOURS
Status: DISCONTINUED | OUTPATIENT
Start: 2020-10-29 | End: 2020-11-02 | Stop reason: HOSPADM

## 2020-10-29 RX ORDER — ASPIRIN 325 MG/1
100 TABLET, FILM COATED ORAL DAILY
Status: DISCONTINUED | OUTPATIENT
Start: 2020-10-29 | End: 2020-11-02 | Stop reason: HOSPADM

## 2020-10-29 RX ORDER — FENTANYL 25 UG/1
1 PATCH TRANSDERMAL
COMMUNITY

## 2020-10-29 RX ORDER — ONDANSETRON 2 MG/ML
4 INJECTION INTRAMUSCULAR; INTRAVENOUS
Status: DISCONTINUED | OUTPATIENT
Start: 2020-10-29 | End: 2020-11-02 | Stop reason: HOSPADM

## 2020-10-29 RX ORDER — ACETAMINOPHEN 325 MG/1
650 TABLET ORAL
Status: DISCONTINUED | OUTPATIENT
Start: 2020-10-29 | End: 2020-10-29

## 2020-10-29 RX ORDER — ASPIRIN 325 MG/1
50 TABLET, FILM COATED ORAL DAILY
Status: DISCONTINUED | OUTPATIENT
Start: 2020-10-30 | End: 2020-10-29

## 2020-10-29 RX ORDER — EMTRICITABINE AND TENOFOVIR DISOPROXIL FUMARATE 200; 300 MG/1; MG/1
1 TABLET, FILM COATED ORAL DAILY
Status: DISCONTINUED | OUTPATIENT
Start: 2020-10-29 | End: 2020-11-02 | Stop reason: HOSPADM

## 2020-10-29 RX ORDER — SENNOSIDES 8.6 MG/1
1 TABLET ORAL
COMMUNITY

## 2020-10-29 RX ORDER — LANOLIN ALCOHOL/MO/W.PET/CERES
1000 CREAM (GRAM) TOPICAL DAILY
Status: DISCONTINUED | OUTPATIENT
Start: 2020-10-29 | End: 2020-11-02 | Stop reason: HOSPADM

## 2020-10-29 RX ORDER — SENNOSIDES 8.6 MG/1
1 TABLET ORAL
Status: DISCONTINUED | OUTPATIENT
Start: 2020-10-29 | End: 2020-11-02 | Stop reason: HOSPADM

## 2020-10-29 RX ORDER — ENOXAPARIN SODIUM 100 MG/ML
40 INJECTION SUBCUTANEOUS EVERY 24 HOURS
Status: DISCONTINUED | OUTPATIENT
Start: 2020-10-29 | End: 2020-10-29

## 2020-10-29 RX ORDER — HEPARIN SODIUM 10000 [USP'U]/ML
5000 INJECTION, SOLUTION INTRAVENOUS; SUBCUTANEOUS EVERY 12 HOURS
Status: DISCONTINUED | OUTPATIENT
Start: 2020-10-29 | End: 2020-10-29

## 2020-10-29 RX ORDER — PANTOPRAZOLE SODIUM 40 MG/1
40 GRANULE, DELAYED RELEASE ORAL
Status: DISCONTINUED | OUTPATIENT
Start: 2020-10-29 | End: 2020-11-02 | Stop reason: HOSPADM

## 2020-10-29 RX ORDER — PREDNISONE 20 MG/1
20 TABLET ORAL DAILY
Status: DISCONTINUED | OUTPATIENT
Start: 2020-10-30 | End: 2020-11-02 | Stop reason: HOSPADM

## 2020-10-29 RX ORDER — ACETAMINOPHEN 650 MG/1
650 SUPPOSITORY RECTAL
Status: DISCONTINUED | OUTPATIENT
Start: 2020-10-29 | End: 2020-10-29

## 2020-10-29 RX ORDER — ACETAMINOPHEN 160 MG/5ML
15 LIQUID ORAL
COMMUNITY

## 2020-10-29 RX ORDER — PREDNISONE 20 MG/1
20 TABLET ORAL DAILY
COMMUNITY

## 2020-10-29 RX ORDER — SODIUM CHLORIDE 0.9 % (FLUSH) 0.9 %
5-40 SYRINGE (ML) INJECTION AS NEEDED
Status: DISCONTINUED | OUTPATIENT
Start: 2020-10-29 | End: 2020-11-02 | Stop reason: HOSPADM

## 2020-10-29 RX ORDER — DOCUSATE SODIUM 50 MG/5ML
100 LIQUID ORAL 2 TIMES DAILY
COMMUNITY

## 2020-10-29 RX ORDER — ACETAMINOPHEN 650 MG/1
650 SUPPOSITORY RECTAL
Status: DISCONTINUED | OUTPATIENT
Start: 2020-10-29 | End: 2020-11-02 | Stop reason: HOSPADM

## 2020-10-29 RX ORDER — FENTANYL 25 UG/1
1 PATCH TRANSDERMAL
Status: DISCONTINUED | OUTPATIENT
Start: 2020-10-29 | End: 2020-11-02 | Stop reason: HOSPADM

## 2020-10-29 RX ORDER — ALBUTEROL SULFATE 2.5 MG/.5ML
2.5 SOLUTION RESPIRATORY (INHALATION)
Status: DISCONTINUED | OUTPATIENT
Start: 2020-10-29 | End: 2020-11-02 | Stop reason: HOSPADM

## 2020-10-29 RX ORDER — ACETAMINOPHEN 325 MG/1
650 TABLET ORAL
Status: DISCONTINUED | OUTPATIENT
Start: 2020-10-29 | End: 2020-11-02 | Stop reason: HOSPADM

## 2020-10-29 RX ORDER — ZIPRASIDONE HYDROCHLORIDE 40 MG/1
40 CAPSULE ORAL DAILY
Status: DISCONTINUED | OUTPATIENT
Start: 2020-10-29 | End: 2020-11-02 | Stop reason: HOSPADM

## 2020-10-29 RX ORDER — ACETAMINOPHEN 120 MG/1
120 SUPPOSITORY RECTAL
Status: DISCONTINUED | OUTPATIENT
Start: 2020-10-29 | End: 2020-10-29

## 2020-10-29 RX ORDER — DOCUSATE SODIUM 50 MG/5ML
100 LIQUID ORAL 2 TIMES DAILY
Status: DISCONTINUED | OUTPATIENT
Start: 2020-10-29 | End: 2020-11-02 | Stop reason: HOSPADM

## 2020-10-29 RX ADMIN — CYANOCOBALAMIN TAB 1000 MCG 1000 MCG: 1000 TAB at 14:00

## 2020-10-29 RX ADMIN — ENOXAPARIN SODIUM 40 MG: 40 INJECTION SUBCUTANEOUS at 10:05

## 2020-10-29 RX ADMIN — PANTOPRAZOLE SODIUM 40 MG: 40 GRANULE, DELAYED RELEASE ORAL at 10:06

## 2020-10-29 RX ADMIN — EMTRICITABINE AND TENOFOVIR DISOPROXIL FUMARATE 1 TABLET: 200; 300 TABLET, FILM COATED ORAL at 10:06

## 2020-10-29 RX ADMIN — PIPERACILLIN SODIUM AND TAZOBACTAM SODIUM 3.38 G: 3; .375 INJECTION, POWDER, LYOPHILIZED, FOR SOLUTION INTRAVENOUS at 15:09

## 2020-10-29 RX ADMIN — RALTEGRAVIR 400 MG: 400 TABLET, FILM COATED ORAL at 10:06

## 2020-10-29 RX ADMIN — DOCUSATE SODIUM 100 MG: 50 LIQUID ORAL at 10:06

## 2020-10-29 RX ADMIN — ZIPRASIDONE HCL 40 MG: 40 CAPSULE ORAL at 10:05

## 2020-10-29 RX ADMIN — THIAMINE HCL TAB 100 MG 100 MG: 100 TAB at 10:06

## 2020-10-29 RX ADMIN — Medication 10 ML: at 19:37

## 2020-10-29 NOTE — ACP (ADVANCE CARE PLANNING)
10/29/20. CM called [facility] - listed as contact - informed contacts were his nephew Samson Paget @ [phone]) & niece Cecily Lefort @ [phone]). CM spoke with leslie regarding Covid 23 questionnaire-  who stated that they are pts healthcare decision maker. Preference is for DNR & NO resuscitation & Ventilator Support only if recovery likely if pts health worsened from Covid & NO Ventilator Support if recovery not likely. DNR request passed on to nurse.

## 2020-10-29 NOTE — H&P
History and Physical              Subjective :   Chief Complaint : Fever and vomiting coffee-ground emesis    Source of information : Mostly from the medical records and ED provider    History of present illness:   81 y.o. male nonverbal noncommunicative with chronically bedridden status, HIV, recent COVID-19 positive presents to the emergency room from [facility] by emergency crew as they found him vomiting coffee-ground emesis. Suspected of aspiration pneumonia. This patient was recently hospitalized for more than a month due to Klebsiella pneumonia pneumonia that was treated with improvement. He presented with acute respiratory failure last admission. Now currently he is on nonrebreather mask saturating 100%, he just awake with eyes open but does not understand anything. Chest x-ray suggestive of no improvement from pneumonia findings from previous x-ray. Past Medical History:   Diagnosis Date    Bed confinement status     D[facility] ([facility]     Hepatitis C     HIV (human immunodeficiency virus infection) ([facility]     Schizophrenia ([facility]      History reviewed. No pertinent surgical history. Family History   Family history unknown: Yes      Social History     Tobacco Use    Smoking status: Never Smoker    Smokeless tobacco: Never Used   Substance Use Topics    Alcohol use: Never     Frequency: Never       Prior to Admission medications    Medication Sig Start Date End Date Taking? Authorizing Provider   docusate (COLACE) 50 mg/5 mL liquid 100 mg by Per G Tube route two (2) times a day. Yes Provider, Historical   senna (Senna) 8.6 mg tablet 1 Tab by Per G Tube route nightly. Yes Provider, Historical   fentaNYL (DURAGESIC) 25 mcg/hr PATCH 1 Patch by TransDERmal route every seventy-two (72) hours. Yes Provider, Historical   thiamine mononitrate (B-1) 100 mg tablet Take 0.5 Tabs by mouth daily.  9/18/20  Yes Cassidy Villasenor MD   albuterol (PROVENTIL VENTOLIN) 2.5 mg /3 mL (0.083 %) nebu 2.5 mg by Nebulization route every two (2) hours as needed for Wheezing or Shortness of Breath. Yes Provider, Historical   cyanocobalamin 1,000 mcg tablet Take 1,000 mcg by mouth daily. Yes Provider, Historical   emtricitabine-tenofovir, TDF, (TRUVADA) 200-300 mg per tablet Take 1 Tab by mouth daily. Yes Provider, Historical   lansoprazole (PREVACID) 30 mg capsule 30 mg by Per G Tube route Daily (before breakfast). Yes Provider, Historical   raltegravir (ISENTRESS) 400 mg tablet Take 400 mg by mouth two (2) times a day. Yes Provider, Historical   thiamine (B-1) 50 mg tablet Take 50 mg by mouth daily. Yes Provider, Historical   ZIPRASIDONE HCL PO 20 mg by Per G Tube route every other day. 10/28/20 11/12/20 Yes Provider, Historical     Allergies   Allergen Reactions    Sclavotest-Ppd Unknown (comments)             Review of Systems:  Unable to obtain any information from patient due to his mental condition. Vitals:     Patient Vitals for the past 12 hrs:   Temp Pulse Resp BP SpO2   10/28/20 1951 (!) 100.6 °F (38.1 °C) (!) 104 20 117/76 99 %       Physical Exam:   General : Nonresponsive patient and noncommunicative and he does not understand anything seems to have no brain function. Laying in the bed with eyes open. No acute distress noted  HEENT : PERRLA, mouth is open, oral mucosa appears dry. Neck : Supple, no JVD, no masses noted, no carotid bruit. Lungs : Breath sounds with good air entry bilaterally, no wheezes or rales, no accessory muscle use. CVS : Rhythm rate regular, S1+, S2+, no murmur or gallop. Abdomen : Soft, nontender, G-tube present. Bowel sounds active. Extremities : No edema noted,  pedal pulses palpable. Musculoskeletal : Unable to check range of motion, it seems very stiff upper and lower extremities. No joint effusions noted. Skin : Moist, warm, skin turgor, no pathological rash. Lymphatic : No cervical lymphadenopathy.   Neurological : Awake, he has no brain function. Long-term back in 2005 when he had a EEG it was a flattened waves. Psychiatric : History of schizophrenia but at this time he is unresponsive. Data Review:   Recent Results (from the past 24 hour(s))   URINALYSIS W/ REFLEX CULTURE    Collection Time: 10/28/20  8:45 PM    Specimen: Urine   Result Value Ref Range    Color Yellow/Straw      Appearance Clear Clear      Specific gravity 1.014 1.003 - 1.030      pH (UA) 7.0 5.0 - 8.0      Protein Negative Negative mg/dL    Glucose Negative Negative mg/dL    Ketone Negative Negative mg/dL    Bilirubin Negative Negative      Blood Negative Negative      Urobilinogen 4.0 (H) 0.1 - 1.0 EU/dL    Nitrites Negative Negative      Leukocyte Esterase Negative Negative      UA:UC IF INDICATED Culture not indicated by UA result Culture not indicated by UA result      WBC 0-4 0 - 4 /hpf    RBC 0-5 0 - 5 /hpf    Bacteria Negative Negative /hpf    Mucus Trace /lpf   BNP    Collection Time: 10/28/20  9:55 PM   Result Value Ref Range    NT pro- (H) <125 pg/mL   CBC WITH AUTOMATED DIFF    Collection Time: 10/28/20  9:55 PM   Result Value Ref Range    WBC 15.9 (H) 4.1 - 11.1 K/uL    RBC 4.57 4.10 - 5.70 M/uL    HGB 14.6 12.1 - 17.0 g/dL    HCT 42.9 36.6 - 50.3 %    MCV 93.9 80.0 - 99.0 FL    MCH 31.9 26.0 - 34.0 PG    MCHC 34.0 30.0 - 36.5 g/dL    RDW 13.1 11.5 - 14.5 %    PLATELET 679 076 - 420 K/uL    MPV 11.0 8.9 - 12.9 FL    NEUTROPHILS 85 (H) 32 - 75 %    LYMPHOCYTES 10 (L) 12 - 49 %    MONOCYTES 5 5 - 13 %    EOSINOPHILS 0 0 - 7 %    BASOPHILS 0 0 - 1 %    IMMATURE GRANULOCYTES 0 0.0 - 0.5 %    ABS. NEUTROPHILS 13.4 (H) 1.8 - 8.0 K/UL    ABS. LYMPHOCYTES 1.6 0.8 - 3.5 K/UL    ABS. MONOCYTES 0.9 0.0 - 1.0 K/UL    ABS. EOSINOPHILS 0.0 0.0 - 0.4 K/UL    ABS. BASOPHILS 0.0 0.0 - 0.1 K/UL    ABS. IMM.  GRANS. 0.1 (H) 0.00 - 0.04 K/UL    DF AUTOMATED     METABOLIC PANEL, COMPREHENSIVE    Collection Time: 10/28/20  9:55 PM   Result Value Ref Range    Sodium 148 (H) 136 - 145 mmol/L    Potassium 3.6 3.5 - 5.1 mmol/L    Chloride 113 (H) 97 - 108 mmol/L    CO2 26 21 - 32 mmol/L    Anion gap 9 5 - 15 mmol/L    Glucose 102 (H) 65 - 100 mg/dL    BUN 18 6 - 20 mg/dL    Creatinine 0.98 0.70 - 1.30 mg/dL    BUN/Creatinine ratio 18 12 - 20      GFR est AA >60 >60 ml/min/1.73m2    GFR est non-AA >60 >60 ml/min/1.73m2    Calcium 8.9 8.5 - 10.1 mg/dL    Bilirubin, total 0.6 0.2 - 1.0 mg/dL    AST (SGOT) 16 15 - 37 U/L    ALT (SGPT) 22 12 - 78 U/L    Alk. phosphatase 69 45 - 117 U/L    Protein, total 7.5 6.4 - 8.2 g/dL    Albumin 3.1 (L) 3.5 - 5.0 g/dL    Globulin 4.4 (H) 2.0 - 4.0 g/dL    A-G Ratio 0.7 (L) 1.1 - 2.2     LACTIC ACID    Collection Time: 10/28/20  9:55 PM   Result Value Ref Range    Lactic acid 1.6 0.4 - 2.0 mmol/L   TROPONIN I    Collection Time: 10/28/20  9:55 PM   Result Value Ref Range    Troponin-I, Qt. <0.05 <0.05 ng/mL   OCCULT BLOOD, STOOL    Collection Time: 10/28/20 10:15 PM   Result Value Ref Range    Occult Blood,day 1 Positive (A) Negative      Day 1 date: Positive     TYPE & SCREEN    Collection Time: 10/28/20 11:06 PM   Result Value Ref Range    Crossmatch Expiration 10/31/2020,2359     ABO/Rh(D) AB Negative     Antibody screen Negative        Radiologic Studies :   CT Results  (Last 48 hours)    None        CXR Results  (Last 48 hours)               10/28/20 2236  XR CHEST SNGL V Final result    Impression:  IMPRESSION: There is persistent airspace disease within the lung bases without   resolution when compared to prior imaging. Although these findings could   represent recurrent aspiration pneumonia other chronic airspace processes cannot   be fully excluded. 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 Narrative: This study is a portable radiograph of the chest dated 10/28/2020. HISTORY: Fever aspiration most likely. COMPARISON: 9/10/2020.        FINDINGS: There is normal size to the cardiac/pericardial silhouette. The   pulmonary vessels are normal in caliber. There is airspace disease within the mid to lower lung zones associated with an   elevation of the hemidiaphragms. There are similar airspace disease in prior   imaging although there is a greater component of airspace disease within the mid   lung zones and now the airspace process is limited to the lung bases. The costophrenic angles are maintained without pleural effusions. There is   mild-to-moderate ectasia of the thoracic aorta. Otherwise, the mediastinal   structures are normal. The bony thorax is intact. Assessment and Plan :   Aspiration pneumonia: Chronic and recurrent secondary to his condition, seems no change in x-ray. Acute respiratory failure: On nonrebreather mask saturating well. Per nursing staff he was saturating only 80% on room air. We will continue    HIV disease status: Continue home medications    History of hepatitis C    Recent history of Klebsiella pneumonia that was treated with improvement and not on any antibiotics. Admitted to medical telemetry, patient cannot make any decisions. Seems he is full CODE STATUS per records from the [facility]. Home medications reviewed with [facility] from the [facility]. CC : Esa Urias MD/[facility]. Signed By: Angelo Mulligan MD     October 29, 2020      This dictation was done by dragon, computer voice recognition software. Often unanticipated grammatical, syntax, [place] phones and other interpretive errors are inadvertently transcribed. Please excuse errors that have escaped final proofreading.

## 2020-10-29 NOTE — CONSULTS
No additional labs necessary at this time.     PULMONARY CONSULT  [facility]    Name: Annita Mcdermott MRN: [id number]   : 1950 Hospital: [facility]   Date: 10/29/2020  Admission date: 10/28/2020 Hospital Day: 2       HPI:     Hospital Problems  Date Reviewed: 10/29/2020          Codes Class Noted POA    Pneumonia ICD-10-CM: J18.9  ICD-9-CM: 470  10/29/2020 Unknown                   [x] High complexity decision making was performed  [x] See my orders for details      Subjective/Initial History:     I was asked by Berta Harrison MD to see Annita Mcdermott  a 81 y.o.  male in consultation     Excerpts from admission 10/28/2020 or consult notes as follows:   81year old male from [address] [place] [address] came in because of fever and vomiting he is nonverbal bedridden HIV-positive unable to get any history out of the patient he was admitted to the hospital previously because of pneumonia which was Klebsiella he was put on 100% nonrebreather mask in the emergency room which has been changed to Ventimask open eyes but does not follow any command so admitted and pulmonary consult was called to further evaluation.       Allergies   Allergen Reactions    Sclavotest-Ppd Unknown (comments)        MAR reviewed and pertinent medications noted or modified as needed     Current Facility-Administered Medications   Medication    acetaminophen (TYLENOL) suppository 650 mg    albuterol (PROVENTIL VENTOLIN) nebulizer solution 2.5 mg    cyanocobalamin tablet 1,000 mcg    docusate (COLACE) 50 mg/5 mL oral liquid 100 mg    emtricitabine-tenofovir (TDF) (TRUVADA) 200-300 mg per tablet 1 Tab    pantoprazole (PROTONIX) granules for oral suspension 40 mg    raltegravir (ISENTRESS) tablet 400 mg    thiamine mononitrate (B-1) tablet 100 mg    ziprasidone (GEODON) capsule 40 mg    enoxaparin (LOVENOX) injection 40 mg     Current Outpatient Medications   Medication Sig    docusate (COLACE) 50 mg/5 mL liquid 100 mg by Per G Tube route two (2) times a day.  senna (Senna) 8.6 mg tablet 1 Tab by Per G Tube route nightly.  fentaNYL (DURAGESIC) 25 mcg/hr PATCH 1 Patch by TransDERmal route every seventy-two (72) hours.  predniSONE (DELTASONE) 20 mg tablet Take 20 mg by mouth daily.  acetaminophen (TYLENOL) 160 mg/5 mL liquid 15 mg/kg by Per G Tube route every six (6) hours as needed for Fever.  thiamine mononitrate (B-1) 100 mg tablet Take 0.5 Tabs by mouth daily.  albuterol (PROVENTIL VENTOLIN) 2.5 mg /3 mL (0.083 %) nebu 2.5 mg by Nebulization route every two (2) hours as needed for Wheezing or Shortness of Breath.  cyanocobalamin 1,000 mcg tablet Take 1,000 mcg by mouth daily.  emtricitabine-tenofovir, TDF, (TRUVADA) 200-300 mg per tablet Take 1 Tab by mouth daily.  lansoprazole (PREVACID) 30 mg capsule 30 mg by Per G Tube route Daily (before breakfast).  raltegravir (ISENTRESS) 400 mg tablet Take 400 mg by mouth two (2) times a day.  thiamine (B-1) 50 mg tablet Take 50 mg by mouth daily.  ZIPRASIDONE HCL PO 40 mg by Per G Tube route daily. Patient PCP: Katty George MD  PMH:  has a past medical history of Bed confinement status, Dementia ([facility] [address] Hepatitis C, HIV (human immunodeficiency virus infection) ([facility] [address] and Schizophrenia ([facility] [address] [facility] PSH:   has no past surgical history on file. FHX: Family history is unknown by patient. SHX:  reports that he has never smoked. He has never used smokeless tobacco. He reports that he does not drink alcohol or use drugs.      ROS:  Unable to obtain      Objective:     Vital Signs: Telemetry:    normal sinus rhythm Intake/Output:   Visit Vitals  /75   Pulse 88   Temp 100.4 °F (38 °C)   Resp 20   Ht 6' (1.829 m)   Wt 79.4 kg (175 lb)   SpO2 95%   BMI 23.73 kg/m²       Temp (24hrs), Av.1 °F (38.4 °C), Min:100.4 °F (38 °C), Max:102.4 °F (39.1 °C)        O2 Device: Ventimask O2 Flow Rate (L/min): 6 l/min       Wt Readings from Last 4 Encounters:   10/28/20 79.4 kg (175 lb)   09/28/20 82.3 kg (181 lb 7 oz)          Intake/Output Summary (Last 24 hours) at 10/29/2020 1157  Last data filed at 10/29/2020 0254  Gross per 24 hour   Intake 1100 ml   Output --   Net 1100 ml       Last shift:      No intake/output data recorded. Last 3 shifts: 10/27 1901 - 10/29 0700  In: 1100 [I.V.:1100]  Out: -        Physical Exam:     Physical Exam   Constitutional: He appears distressed. Nonverbal   HENT:   Head: Normocephalic and atraumatic. Neck: Normal range of motion. Cardiovascular: Normal rate and normal heart sounds. Pulmonary/Chest: He is in respiratory distress. He has rales. Abdominal: Bowel sounds are normal.   Musculoskeletal: Normal range of motion. Neurological:   Nonverbal        Labs:    Recent Labs     10/28/20  2155   WBC 15.9*   HGB 14.6        Recent Labs     10/28/20  2155   *   K 3.6   *   CO2 26   *   BUN 18   CREA 0.98   CA 8.9   LAC 1.6   ALB 3.1*   ALT 22     Recent Labs     10/29/20  0953   PH 7.430   PCO2 41   PO2 145*   HCO3 27*     Recent Labs     10/28/20  2155   Sueanne Ponsford <0.05     No results found for: BNPP, BNP   No results found for: CULTNo results found for: TSH, TSHEXT    Imaging:    CXR Results  (Last 48 hours)               10/28/20 2236  XR CHEST SNGL V Final result    Impression:  IMPRESSION: There is persistent airspace disease within the lung bases without   resolution when compared to prior imaging. Although these findings could   represent recurrent aspiration pneumonia other chronic airspace processes cannot   be fully excluded. 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 Narrative: This study is a portable radiograph of the chest dated 10/28/2020. HISTORY: Fever aspiration most likely. COMPARISON: 9/10/2020. FINDINGS: There is normal size to the cardiac/pericardial silhouette.  The   pulmonary vessels are normal in caliber. There is airspace disease within the mid to lower lung zones associated with an   elevation of the hemidiaphragms. There are similar airspace disease in prior   imaging although there is a greater component of airspace disease within the mid   lung zones and now the airspace process is limited to the lung bases. The costophrenic angles are maintained without pleural effusions. There is   mild-to-moderate ectasia of the thoracic aorta. Otherwise, the mediastinal   structures are normal. The bony thorax is intact. Results from [place] encounter on 10/28/20   XR CHEST SNGL V    Narrative This study is a portable radiograph of the chest dated 10/28/2020. HISTORY: Fever aspiration most likely. COMPARISON: 9/10/2020. FINDINGS: There is normal size to the cardiac/pericardial silhouette. The  pulmonary vessels are normal in caliber. There is airspace disease within the mid to lower lung zones associated with an  elevation of the hemidiaphragms. There are similar airspace disease in prior  imaging although there is a greater component of airspace disease within the mid  lung zones and now the airspace process is limited to the lung bases. The costophrenic angles are maintained without pleural effusions. There is  mild-to-moderate ectasia of the thoracic aorta. Otherwise, the mediastinal  structures are normal. The bony thorax is intact. Impression IMPRESSION: There is persistent airspace disease within the lung bases without  resolution when compared to prior imaging. Although these findings could  represent recurrent aspiration pneumonia other chronic airspace processes cannot  be fully excluded. Cryptogenic organized pneumonia can present with chronic  peribronchial and perivascular parenchymal opacification. No results found for this or any previous visit. IMPRESSION:   1. Acute hypoxic respiratory failure  2. Aspiration pneumonia   3.  HIV positive  4. History of hepatitis C  5. Pt is requiring Drug therapy requiring intensive monitoring for toxicity  6. Pt is unstable, unpredictable needing inpatient monitoring; is acutely ill and at high risk of sudden decline and decompensation with severe consequenses and continued end organ dysfunction and failure  7. Prognosis guarded       RECOMMENDATIONS/PLAN:     1. Patient is in acute respiratory distress he was put on 100% nonrebreather mask now he is on 50% Ventimask get arterial blood gases to see the PCO2 level and wean oxygen per protocol  2. Agree with Empiric IV antibiotics pending culture results   3. Follow culture results cxr shows bilateral lower lobe infiltrate  4. Continue antiretroviral therapy for HIV  5. Suspected COVID-19 results pending  6. Supplemental O2 to keep sats > 93%  7. Aspiration precautions  8. Labs to follow electrolytes, renal function and and blood counts  9. Glucose monitoring and SSI  10. Bronchial hygiene with respiratory therapy techniques, bronchodilators  11.  DVT, SUP prophylaxis               Kishan Lantigua MD

## 2020-10-29 NOTE — ED PROVIDER NOTES
EMERGENCY DEPARTMENT HISTORY AND PHYSICAL EXAM        Date: 10/28/2020  Patient Name: Tim Steel    History of Presenting Illness     Chief Complaint   Patient presents with    Fever    Vomiting       History Provided By: Nurse according to EMS    HPI: Tim Steel, 81 y.o. male with past medical history of HIV, COVID-19, transitional cell bladder cancer who presents with fever and emesis. Concerned that he may have coffee-ground emesis according to facility. Symptoms have started in the last 1 or 2 days. Is also had cough. Patient was hypoxic but improved with oxygenation. PCP: Katty George MD    Current Facility-Administered Medications   Medication Dose Route Frequency Provider Last Rate Last Dose    piperacillin-tazobactam (ZOSYN) 3.375 g in 0.9% sodium chloride (MBP/ADV) 100 mL MBP  3.375 g IntraVENous NOW Robi Flynn, DO 25 mL/hr at 10/28/20 2254 3.375 g at 10/28/20 2254     Current Outpatient Medications   Medication Sig Dispense Refill    ziprasidone (GEODON) 40 mg capsule 1 Cap by Per G Tube route two (2) times daily (with meals). 30 Cap 0    predniSONE (DELTASONE) 20 mg tablet 20 mg by Per G Tube route daily. Indications: polyarteritis nodosa 30 Tab 0    thiamine mononitrate (B-1) 100 mg tablet Take 0.5 Tabs by mouth daily. 30 Tab 0    doxycycline (MONODOX) 100 mg capsule Take 1 Cap by mouth two (2) times a day. 20 Cap 0    albuterol (PROVENTIL VENTOLIN) 2.5 mg /3 mL (0.083 %) nebu 2.5 mg by Nebulization route every two (2) hours as needed for Wheezing or Shortness of Breath.  cyanocobalamin 1,000 mcg tablet Take 1,000 mcg by mouth daily.  emtricitabine-tenofovir, TDF, (TRUVADA) 200-300 mg per tablet Take 1 Tab by mouth daily.  lansoprazole (PREVACID) 30 mg capsule 30 mg by Per G Tube route Daily (before breakfast).  raltegravir (ISENTRESS) 400 mg tablet Take 400 mg by mouth two (2) times a day.  thiamine (B-1) 50 mg tablet Take 50 mg by mouth daily.       ziprasidone (GEODON) 40 mg capsule 40 mg by Per G Tube route two (2) times daily (with meals). Past History     Past Medical History:  History reviewed. No pertinent past medical history. Past Surgical History:  History reviewed. No pertinent surgical history. Family History:  History reviewed. No pertinent family history. Social History:  Social History     Tobacco Use    Smoking status: Never Smoker    Smokeless tobacco: Never Used   Substance Use Topics    Alcohol use: Never     Frequency: Never    Drug use: Never       Allergies: Allergies   Allergen Reactions    Sclavotest-Ppd Unknown (comments)       Review of Systems   Review of Systems   Unable to perform ROS: Patient nonverbal     Physical Exam   General: Ill-appearing. Well-nourished. Skin: No rash. Head: Normocephalic. Atraumatic. Eye: No proptosis or conjunctival injections. Respiratory: No apparent respiratory distress. Coarse breath sounds bilaterally. Gastrointestinal: Nondistended. Musculoskeletal: No obvious bony deformities. Psychiatric: Cooperative. Appropriate mood and affect.     Diagnostic Study Results     Labs -     Recent Results (from the past 24 hour(s))   URINALYSIS W/ REFLEX CULTURE    Collection Time: 10/28/20  8:45 PM    Specimen: Urine   Result Value Ref Range    Color Yellow/Straw      Appearance Clear Clear      Specific gravity 1.014 1.003 - 1.030      pH (UA) 7.0 5.0 - 8.0      Protein Negative Negative mg/dL    Glucose Negative Negative mg/dL    Ketone Negative Negative mg/dL    Bilirubin Negative Negative      Blood Negative Negative      Urobilinogen 4.0 (H) 0.1 - 1.0 EU/dL    Nitrites Negative Negative      Leukocyte Esterase Negative Negative      UA:UC IF INDICATED Culture not indicated by UA result Culture not indicated by UA result      WBC 0-4 0 - 4 /hpf    RBC 0-5 0 - 5 /hpf    Bacteria Negative Negative /hpf    Mucus Trace /lpf   BNP    Collection Time: 10/28/20  9:55 PM   Result Value Ref Range    NT pro- (H) <125 pg/mL   CBC WITH AUTOMATED DIFF    Collection Time: 10/28/20  9:55 PM   Result Value Ref Range    WBC 15.9 (H) 4.1 - 11.1 K/uL    RBC 4.57 4.10 - 5.70 M/uL    HGB 14.6 12.1 - 17.0 g/dL    HCT 42.9 36.6 - 50.3 %    MCV 93.9 80.0 - 99.0 FL    MCH 31.9 26.0 - 34.0 PG    MCHC 34.0 30.0 - 36.5 g/dL    RDW 13.1 11.5 - 14.5 %    PLATELET 852 216 - 533 K/uL    MPV 11.0 8.9 - 12.9 FL    NEUTROPHILS 85 (H) 32 - 75 %    LYMPHOCYTES 10 (L) 12 - 49 %    MONOCYTES 5 5 - 13 %    EOSINOPHILS 0 0 - 7 %    BASOPHILS 0 0 - 1 %    IMMATURE GRANULOCYTES 0 0.0 - 0.5 %    ABS. NEUTROPHILS 13.4 (H) 1.8 - 8.0 K/UL    ABS. LYMPHOCYTES 1.6 0.8 - 3.5 K/UL    ABS. MONOCYTES 0.9 0.0 - 1.0 K/UL    ABS. EOSINOPHILS 0.0 0.0 - 0.4 K/UL    ABS. BASOPHILS 0.0 0.0 - 0.1 K/UL    ABS. IMM. GRANS. 0.1 (H) 0.00 - 0.04 K/UL    DF AUTOMATED     METABOLIC PANEL, COMPREHENSIVE    Collection Time: 10/28/20  9:55 PM   Result Value Ref Range    Sodium 148 (H) 136 - 145 mmol/L    Potassium 3.6 3.5 - 5.1 mmol/L    Chloride 113 (H) 97 - 108 mmol/L    CO2 26 21 - 32 mmol/L    Anion gap 9 5 - 15 mmol/L    Glucose 102 (H) 65 - 100 mg/dL    BUN 18 6 - 20 mg/dL    Creatinine 0.98 0.70 - 1.30 mg/dL    BUN/Creatinine ratio 18 12 - 20      GFR est AA >60 >60 ml/min/1.73m2    GFR est non-AA >60 >60 ml/min/1.73m2    Calcium 8.9 8.5 - 10.1 mg/dL    Bilirubin, total 0.6 0.2 - 1.0 mg/dL    AST (SGOT) 16 15 - 37 U/L    ALT (SGPT) 22 12 - 78 U/L    Alk.  phosphatase 69 45 - 117 U/L    Protein, total 7.5 6.4 - 8.2 g/dL    Albumin 3.1 (L) 3.5 - 5.0 g/dL    Globulin 4.4 (H) 2.0 - 4.0 g/dL    A-G Ratio 0.7 (L) 1.1 - 2.2     LACTIC ACID    Collection Time: 10/28/20  9:55 PM   Result Value Ref Range    Lactic acid 1.6 0.4 - 2.0 mmol/L   TROPONIN I    Collection Time: 10/28/20  9:55 PM   Result Value Ref Range    Troponin-I, Qt. <0.05 <0.05 ng/mL   OCCULT BLOOD, STOOL    Collection Time: 10/28/20 10:15 PM   Result Value Ref Range    Occult Blood,day 1 Positive (A) Negative      Day 1 date: Positive         Radiologic Studies -   XR CHEST SNGL V   Final Result   IMPRESSION: There is persistent airspace disease within the lung bases without   resolution when compared to prior imaging. Although these findings could   represent recurrent aspiration pneumonia other chronic airspace processes cannot   be fully excluded. 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 CT Results  (Last 48 hours)    None        CXR Results  (Last 48 hours)               10/28/20 2236  XR CHEST SNGL V Final result    Impression:  IMPRESSION: There is persistent airspace disease within the lung bases without   resolution when compared to prior imaging. Although these findings could   represent recurrent aspiration pneumonia other chronic airspace processes cannot   be fully excluded. 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 Narrative: This study is a portable radiograph of the chest dated 10/28/2020. HISTORY: Fever aspiration most likely. COMPARISON: 9/10/2020. FINDINGS: There is normal size to the cardiac/pericardial silhouette. The   pulmonary vessels are normal in caliber. There is airspace disease within the mid to lower lung zones associated with an   elevation of the hemidiaphragms. There are similar airspace disease in prior   imaging although there is a greater component of airspace disease within the mid   lung zones and now the airspace process is limited to the lung bases. The costophrenic angles are maintained without pleural effusions. There is   mild-to-moderate ectasia of the thoracic aorta. Otherwise, the mediastinal   structures are normal. The bony thorax is intact.                  Medical Decision Making and ED Course     I reviewed the available vital signs, nursing notes, past medical history, past surgical history, family history, and social history. Vital Signs - Reviewed the patient's vital signs. Patient Vitals for the past 12 hrs:   Temp Pulse Resp BP SpO2   10/28/20 1951 (!) 100.6 °F (38.1 °C) (!) 104 20 117/76 99 %       EKG interpretation: Sinus tachycardia at 102 bpm.  Normal MN interval, QRS duration, and QTc interval.  Normal axis. No ST segment abnormalities. Medical Decision Making:   Presented with fever, cough, difficulty breathing, and concern for possible hematemesis. The  differential diagnosis is peptic ulcer disease, esophageal varices, aspiration pneumonitis, aspiration pneumonia, COVID-19, acute respiratory failure with hypoxia. Patient presented with primary problem of cough and difficulty breathing with hypoxia. Concern for likely aspiration pneumonia. Hematemesis is possible based on history. Occult blood of stool is pending. Hemoglobin is normal.  Patient hemodynamically stable however does require oxygenation. Ordered antibiotics for possible pneumonia. Fluids ordered as well. Care transition to overnight physician pending remainder of tests. Did give Protonix as well for possible gastrointestinal hemorrhage however I do have low suspicion for this being primary problem. We will know more with occult blood test.    ED Course as of Oct 29 0108   Wed Oct 28, 2020   2313 Patient received a signout from Dr. Mona Haas, presented with fevers, possible sepsis possible aspiration awaiting lab work, and will admit. Hospitalist aware of patient, will admit patient.     [PZ]      ED Course User Index  [PZ] José Miguel Lindsey MD       Procedures     Critical Care Note:   8:34 PM  Amount of critical care time: 30 minutes  Impending deterioration: Airway and Respiratory  Associated risk factors: Hypotension, Hypoxia and Bleeding  Management: Bedside Assessment and Supervision of Care  Interpretation: Xrays and labs  Interventions: IV fluids, abx  Case review: Nursing staff, ER physician  Treatment response: guarded  Performed by: Self  Notes: I have spent critical care time involved in lab review, decision making, bedside attention, and documentation. This time excludes time spent in any separate billed procedures. During this entire length of time I was immediately available to the patient. Felipe Epps DO    Disposition     Care transition to overnight physician, Dr. Lexie Renteria    Diagnosis     Clinical impression:   1. Acute respiratory failure with hypoxia (HCC)         Attestation:  Please note that this dictation was completed with Twit[employer]ump, the computer voice recognition software. Quite often unanticipated grammatical, syntax, homophones, and other interpretive errors are inadvertently transcribed by the computer software. Please disregard these errors. Please excuse any errors that have escaped final proofreading. Thank you.   Felipe Epps DO

## 2020-10-29 NOTE — ROUTINE PROCESS
TRANSFER - OUT REPORT:    Verbal report given to 4E RN(name) on Elver Torres  being transferred to 4E(unit) for routine progression of care       Report consisted of patients Situation, Background, Assessment and   Recommendations(SBAR). Information from the following report(s) SBAR, Kardex, Intake/Output, MAR and Recent Results was reviewed with the receiving nurse. Lines:   Peripheral IV 10/29/20 Right Antecubital (Active)   Site Assessment Clean, dry, & intact 10/29/20 1610   Phlebitis Assessment 0 10/29/20 1610   Infiltration Assessment 0 10/29/20 1610   Dressing Status Clean, dry, & intact 10/29/20 1610   Dressing Type Transparent 10/29/20 1610   Hub Color/Line Status Green;Flushed 10/29/20 1610        Opportunity for questions and clarification was provided.       Patient transported with:   O2 @ 6 liters  Tech, telemetry, pt chart and personal belongings

## 2020-10-29 NOTE — CONSULTS
Infectious Disease Consult Note    Reason for Consult: Aspiration PNA   Date of Consultation: October 29, 2020  Date of Admission: 10/28/2020  Referring Physician:  Hospitalist     HPI:  51-year-old BM admitted earlier today with a diagnosis of aspiration PNA/rule out Covid after presenting with c/o fever and coffee-ground emesis. He is a long-term resident of [facility] w underlying chronic hep C, transitional cell bladder CA and HIV infection. He was recently admitted a month ago with aspiration PNA during which K pneumoniae was isolated from the sputum Cx. He is a very limited historian, history obtained from chart documentations. He was febrile on assessment in the ED w temp of 102.4F, hemodynamically stable and maintaining O2 sats on Ventimask. Labs showed WBC of 15.9, and lactic acid 1.6. UA (10/28) is neg for pyuria. Blood and urine Cxs done in the ED are pending. Admission CXR revealed persistent airspace disease within the lung bases w/o resolution when compared to prior imaging, concerning for recurrent aspiration. He is on Zosyn monotherapy. Review of Systems: Non-contributory, decreased responsiveness     Past Medical History:  Past Medical History:   Diagnosis Date    Bed confinement status     Dementia ([facility] [address]     Hepatitis C     HIV (human immunodeficiency virus infection) ([facility] [address]     Schizophrenia ([facility] [address]        Past surgical history   History reviewed. No pertinent surgical history. Allergies: Allergies   Allergen Reactions    Sclavotest-Ppd Unknown (comments)         Medications:  No current facility-administered medications on file prior to encounter. Current Outpatient Medications on File Prior to Encounter   Medication Sig Dispense Refill    docusate (COLACE) 50 mg/5 mL liquid 100 mg by Per G Tube route two (2) times a day.  senna (Senna) 8.6 mg tablet 1 Tab by Per G Tube route nightly.       fentaNYL (DURAGESIC) 25 mcg/hr PATCH 1 Patch by TransDERmal route every seventy-two (72) hours.  predniSONE (DELTASONE) 20 mg tablet Take 20 mg by mouth daily.  acetaminophen (TYLENOL) 160 mg/5 mL liquid 15 mg/kg by Per G Tube route every six (6) hours as needed for Fever.  thiamine mononitrate (B-1) 100 mg tablet Take 0.5 Tabs by mouth daily. 30 Tab 0    albuterol (PROVENTIL VENTOLIN) 2.5 mg /3 mL (0.083 %) nebu 2.5 mg by Nebulization route every two (2) hours as needed for Wheezing or Shortness of Breath.  cyanocobalamin 1,000 mcg tablet Take 1,000 mcg by mouth daily.  emtricitabine-tenofovir, TDF, (TRUVADA) 200-300 mg per tablet Take 1 Tab by mouth daily.  lansoprazole (PREVACID) 30 mg capsule 30 mg by Per G Tube route Daily (before breakfast).  raltegravir (ISENTRESS) 400 mg tablet Take 400 mg by mouth two (2) times a day.  thiamine (B-1) 50 mg tablet Take 50 mg by mouth daily.  ZIPRASIDONE HCL PO 40 mg by Per G Tube route daily.              Physical Exam:    Vitals:   Patient Vitals for the past 24 hrs:   Temp Pulse Resp BP SpO2   10/29/20 1545 99.4 °F (37.4 °C) 86 22 103/61 95 %   10/29/20 1430 99.6 °F (37.6 °C) 81 24 109/75 98 %   10/29/20 1130 99.6 °F (37.6 °C) 79 24 112/70 96 %   10/29/20 1030 -- -- -- -- 95 %   10/29/20 0930 99.3 °F (37.4 °C) 85 21 112/74 97 %   10/29/20 0520 100.4 °F (38 °C) 88 20 114/75 99 %   10/29/20 0400 -- 90 22 115/72 100 %   10/29/20 0300 -- 93 22 108/71 100 %   10/29/20 0200 -- 93 20 108/73 100 %   10/29/20 0100 -- 95 18 104/89 100 %   10/29/20 0015 -- 100 -- 119/73 100 %   10/28/20 2300 -- (!) 101 20 134/83 100 %   10/28/20 2200 -- (!) 112 22 (!) 143/94 100 %   10/28/20 2100 (!) 102.4 °F (39.1 °C) (!) 108 18 (!) 143/86 100 %   10/28/20 1951 (!) 100.6 °F (38.1 °C) (!) 104 20 117/76 99 %   ·   · GEN: NAD, decreased responsiveness   · HEENT: NCAT, PERRLA   · HEART: S1, S2+, RRR, No murmur   · Lungs: Decreased at the bases, no wheeze/rhonchi   · Abdomen: soft, ND, NT, +BS, + peg tube · Genitourinary: + indwelling izaguirre cath   · Extremities: no edema  · Skin: no rash, chronic wounds or ulcers     Labs:   Recent Labs     10/28/20  2155   WBC 15.9*   HGB 14.6   HCT 42.9        Recent Labs     10/28/20  2155   BUN 18   CREA 0.98       Lab Results   Component Value Date/Time    C-Reactive protein <0.29 09/12/2020 08:06 AM       Microbiology Data:  - Blood Cx 10/28: Pending       Imaging:   CXR 10/28: There is persistent airspace disease within the lung bases without resolution when compared to prior imaging. Although these findings could represent recurrent aspiration pneumonia other chronic airspace processes cannot be fully excluded. Cryptogenic organized pneumonia can present with chronic peribronchial and perivascular parenchymal opacification. Assessment / Plan:     1. Acute hypoxia due aspiration PNA, recent admission for the same      Persistent airspace disease within the lung bases on CXR concerning for chronic aspiration       Continue O2 sats on Ventimask      Febrile on presentation, currently afebrile, significant leukocytosis, normal lactic acid       Agree w empiric Zosyn for aspiration PNA coverage       Routine labs in the morning. 2. Suspected COVID-19: Await confirmatory test      3. HIV infection: Continue maintenance medications, isentress, and Truvada. Check HIV RNA VL and T cell count     4. Dysphagia: S/p peg tube placement     5. H/o  transitional cell bladder Ca per records     6.  H/o chronic hep C, unclear if treated in the past.     7. AMS/decreased responsiveness: Baseline mentation unknown       Brandin Dodson MD     10/29/2020

## 2020-10-29 NOTE — PROGRESS NOTES
Hospitalist Progress Note    Subjective:   Daily Progress Note: 10/29/2020 4:25 PM    Hospital Course:  History of present illness:   81 y.o. male nonverbal noncommunicative with chronically bedridden status, HIV, recent [phone] positive presents to the emergency room from [facility] by emergency crew as they found him vomiting coffee-ground emesis. Suspected of aspiration pneumonia. This patient was recently hospitalized for more than a month due to Klebsiella pneumonia pneumonia that was treated with improvement. He presented with acute respiratory failure last admission. Now currently he is on nonrebreather mask saturating 100%, he just awake with eyes open but does not understand anything. Chest x-ray suggestive of no improvement from pneumonia findings from previous x-ray.     Subjective: Pt seen in the ED, basically non verbal, moans, no spontaneous movement of extremities, rigid like movements    Current Facility-Administered Medications   Medication Dose Route Frequency    piperacillin-tazobactam (ZOSYN) 3.375 g in 0.9% sodium chloride (MBP/ADV) 100 mL MBP  3.375 g IntraVENous Q8H    albuterol (PROVENTIL VENTOLIN) nebulizer solution 2.5 mg  2.5 mg Nebulization Q6H PRN    cyanocobalamin tablet 1,000 mcg  1,000 mcg Oral DAILY    docusate (COLACE) 50 mg/5 mL oral liquid 100 mg  100 mg Per G Tube BID    emtricitabine-tenofovir (TDF) (TRUVADA) 200-300 mg per tablet 1 Tab  1 Tab Oral DAILY    fentaNYL (DURAGESIC) 25 mcg/hr patch 1 Patch  1 Patch TransDERmal Q72H    pantoprazole (PROTONIX) granules for oral suspension 40 mg  40 mg Per G Tube ACB    raltegravir (ISENTRESS) tablet 400 mg  400 mg Oral BID    senna (SENOKOT) tablet 8.6 mg  1 Tab Per G Tube QHS    thiamine mononitrate (B-1) tablet 100 mg  100 mg Per G Tube DAILY    ziprasidone (GEODON) capsule 40 mg  40 mg Oral DAILY    sodium chloride (NS) flush 5-40 mL  5-40 mL IntraVENous Q8H    sodium chloride (NS) flush 5-40 mL 5-40 mL IntraVENous PRN    enoxaparin (LOVENOX) injection 40 mg  40 mg SubCUTAneous Q24H    acetaminophen (TYLENOL) tablet 650 mg  650 mg Per J Tube Q4H PRN    [START ON 10/30/2020] predniSONE (DELTASONE) tablet 20 mg  20 mg Oral DAILY    acetaminophen (TYLENOL) suppository 650 mg  650 mg Rectal Q4H PRN    heparin porcine injection 5,000 Units  5,000 Units SubCUTAneous Q12H    ondansetron (ZOFRAN) injection 4 mg  4 mg IntraVENous Q6H PRN     Current Outpatient Medications   Medication Sig    docusate (COLACE) 50 mg/5 mL liquid 100 mg by Per G Tube route two (2) times a day.  senna (Senna) 8.6 mg tablet 1 Tab by Per G Tube route nightly.  fentaNYL (DURAGESIC) 25 mcg/hr PATCH 1 Patch by TransDERmal route every seventy-two (72) hours.  predniSONE (DELTASONE) 20 mg tablet Take 20 mg by mouth daily.  acetaminophen (TYLENOL) 160 mg/5 mL liquid 15 mg/kg by Per G Tube route every six (6) hours as needed for Fever.  thiamine mononitrate (B-1) 100 mg tablet Take 0.5 Tabs by mouth daily.  albuterol (PROVENTIL VENTOLIN) 2.5 mg /3 mL (0.083 %) nebu 2.5 mg by Nebulization route every two (2) hours as needed for Wheezing or Shortness of Breath.  cyanocobalamin 1,000 mcg tablet Take 1,000 mcg by mouth daily.  emtricitabine-tenofovir, TDF, (TRUVADA) 200-300 mg per tablet Take 1 Tab by mouth daily.  lansoprazole (PREVACID) 30 mg capsule 30 mg by Per G Tube route Daily (before breakfast).  raltegravir (ISENTRESS) 400 mg tablet Take 400 mg by mouth two (2) times a day.  thiamine (B-1) 50 mg tablet Take 50 mg by mouth daily.  ZIPRASIDONE HCL PO 40 mg by Per G Tube route daily.         Review of Systems:    ROS   Unable to obtain due to dementia, advanced schizophrenia  Objective:     Visit Vitals  /61 (BP 1 Location: Left arm, BP Patient Position: At rest)   Pulse 86   Temp 99.4 °F (37.4 °C)   Resp 22   Ht 6' (1.829 m)   Wt 79.4 kg (175 lb)   SpO2 95%   BMI 23.73 kg/m²    O2 Flow Rate (L/min): 6 l/min O2 Device: Ventimask    Temp (24hrs), Av.2 °F (37.9 °C), Min:99.3 °F (37.4 °C), Max:102.4 °F (39.1 °C)      10/29 0701 - 10/29 1900  In: -   Out: 5159 [DACKT:5028]  10/27 1901 - 10/29 0700  In: 1100 [I.V.:1100]  Out: -     PHYSICAL EXAM:    Physical Exam   Constitutional: He appears well-developed. No distress. Cardiovascular: Normal rate, regular rhythm and intact distal pulses. Murmur heard. Pulmonary/Chest: Accessory muscle usage present. He has rhonchi in the right middle field and the left middle field. Abdominal: Soft. Bowel sounds are normal.   PEG tube intact   Genitourinary:    Penis and rectum normal.      Genitourinary Comments: Clemons catheter patent, clear yellow urine     Musculoskeletal:         General: Deformity present. Comments: Rigid,  Semi contracted with arms   Neurological: He is alert. Non verbal   Skin: Skin is warm and dry.           Data Review    Recent Results (from the past 24 hour(s))   URINALYSIS W/ REFLEX CULTURE    Collection Time: 10/28/20  8:45 PM    Specimen: Urine   Result Value Ref Range    Color Yellow/Straw      Appearance Clear Clear      Specific gravity 1.014 1.003 - 1.030      pH (UA) 7.0 5.0 - 8.0      Protein Negative Negative mg/dL    Glucose Negative Negative mg/dL    Ketone Negative Negative mg/dL    Bilirubin Negative Negative      Blood Negative Negative      Urobilinogen 4.0 (H) 0.1 - 1.0 EU/dL    Nitrites Negative Negative      Leukocyte Esterase Negative Negative      UA:UC IF INDICATED Culture not indicated by UA result Culture not indicated by UA result      WBC 0-4 0 - 4 /hpf    RBC 0-5 0 - 5 /hpf    Bacteria Negative Negative /hpf    Mucus Trace /lpf   BNP    Collection Time: 10/28/20  9:55 PM   Result Value Ref Range    NT pro- (H) <125 pg/mL   CBC WITH AUTOMATED DIFF    Collection Time: 10/28/20  9:55 PM   Result Value Ref Range    WBC 15.9 (H) 4.1 - 11.1 K/uL    RBC 4.57 4.10 - 5.70 M/uL    HGB 14.6 12.1 - 17.0 g/dL    HCT 42.9 36.6 - 50.3 %    MCV 93.9 80.0 - 99.0 FL    MCH 31.9 26.0 - 34.0 PG    MCHC 34.0 30.0 - 36.5 g/dL    RDW 13.1 11.5 - 14.5 %    PLATELET 729 290 - 275 K/uL    MPV 11.0 8.9 - 12.9 FL    NEUTROPHILS 85 (H) 32 - 75 %    LYMPHOCYTES 10 (L) 12 - 49 %    MONOCYTES 5 5 - 13 %    EOSINOPHILS 0 0 - 7 %    BASOPHILS 0 0 - 1 %    IMMATURE GRANULOCYTES 0 0.0 - 0.5 %    ABS. NEUTROPHILS 13.4 (H) 1.8 - 8.0 K/UL    ABS. LYMPHOCYTES 1.6 0.8 - 3.5 K/UL    ABS. MONOCYTES 0.9 0.0 - 1.0 K/UL    ABS. EOSINOPHILS 0.0 0.0 - 0.4 K/UL    ABS. BASOPHILS 0.0 0.0 - 0.1 K/UL    ABS. IMM. GRANS. 0.1 (H) 0.00 - 0.04 K/UL    DF AUTOMATED     METABOLIC PANEL, COMPREHENSIVE    Collection Time: 10/28/20  9:55 PM   Result Value Ref Range    Sodium 148 (H) 136 - 145 mmol/L    Potassium 3.6 3.5 - 5.1 mmol/L    Chloride 113 (H) 97 - 108 mmol/L    CO2 26 21 - 32 mmol/L    Anion gap 9 5 - 15 mmol/L    Glucose 102 (H) 65 - 100 mg/dL    BUN 18 6 - 20 mg/dL    Creatinine 0.98 0.70 - 1.30 mg/dL    BUN/Creatinine ratio 18 12 - 20      GFR est AA >60 >60 ml/min/1.73m2    GFR est non-AA >60 >60 ml/min/1.73m2    Calcium 8.9 8.5 - 10.1 mg/dL    Bilirubin, total 0.6 0.2 - 1.0 mg/dL    AST (SGOT) 16 15 - 37 U/L    ALT (SGPT) 22 12 - 78 U/L    Alk.  phosphatase 69 45 - 117 U/L    Protein, total 7.5 6.4 - 8.2 g/dL    Albumin 3.1 (L) 3.5 - 5.0 g/dL    Globulin 4.4 (H) 2.0 - 4.0 g/dL    A-G Ratio 0.7 (L) 1.1 - 2.2     LACTIC ACID    Collection Time: 10/28/20  9:55 PM   Result Value Ref Range    Lactic acid 1.6 0.4 - 2.0 mmol/L   TROPONIN I    Collection Time: 10/28/20  9:55 PM   Result Value Ref Range    Troponin-I, Qt. <0.05 <0.05 ng/mL   OCCULT BLOOD, STOOL    Collection Time: 10/28/20 10:15 PM   Result Value Ref Range    Occult Blood,day 1 Positive (A) Negative      Day 1 date: Positive     TYPE & SCREEN    Collection Time: 10/28/20 11:06 PM   Result Value Ref Range    Crossmatch Expiration 10/31/2020,2359     ABO/Rh(D) AB Negative     Antibody screen Negative    SARS-COV-2    Collection Time: 10/29/20  9:15 AM   Result Value Ref Range    SARS-CoV-2 Please find results under separate order     BLOOD GAS, ARTERIAL    Collection Time: 10/29/20  9:53 AM   Result Value Ref Range    pH 7.430 7.35 - 7.45      PCO2 41 35 - 45 mmHg    PO2 145 (H) 75 - 100 mmHg    O2  >95 %    BICARBONATE 27 (H) 22 - 26 mmol/L    BASE EXCESS 2.8 (H) 0 - 2 mmol/L    O2 METHOD Non-rebreathing mask      O2 FLOW RATE 5.0 L/min    EPAP/CPAP/PEEP 0      SITE Right Radial      TIFFANIE'S TEST PASS         XR CHEST SNGL V   Final Result   IMPRESSION: There is persistent airspace disease within the lung bases without   resolution when compared to prior imaging. Although these findings could   represent recurrent aspiration pneumonia other chronic airspace processes cannot   be fully excluded. 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 Active Problems:    Pneumonia (10/29/2020)        Assessment/Plan:   1. Aspiration pneumonia- ID consulted for recommendations, started on IV zosyn,  Pulmonary consulted, wean O2 as tolerated, ABGS normal. CXR showing recurrent aspiration,  Rule out covid 19.     2. Advanced dementia/dysphagia- PEG feedings, nutrition consulted    3. History of schizophrenia- on geodon,     4. History of HIV- continue medications as prescribed    5. GI prophylaxis- PPI. 6. Hematemesis/occult blood positive- consult to GI, repeat CBC in am.           DVT Prophylaxis: chemical prophylaxis not needed due to chronic bed bound  Code Status:  Partial Code  PO[address]IZ[address]ETH [address] @ [phone]) & leslie Limrodneyelizabeth Reyes @ [phone])  Spoke with Ms Jeanette Narayan via phone, wants partial code only, mechanical ventilation , no CPR  No defibrillation, no chemical resuscitation.    Care Plan discussed with:   ______________patient's POA via phone, staff nurse, pulmonary and infectious disease_________________________________________________    Dianne Webster, NP

## 2020-10-30 LAB
ALBUMIN SERPL-MCNC: 2.8 G/DL (ref 3.5–5)
ALBUMIN/GLOB SERPL: 0.7 {RATIO} (ref 1.1–2.2)
ALP SERPL-CCNC: 59 U/L (ref 45–117)
ALT SERPL-CCNC: 17 U/L (ref 12–78)
ANION GAP SERPL CALC-SCNC: 7 MMOL/L (ref 5–15)
AST SERPL W P-5'-P-CCNC: 15 U/L (ref 15–37)
BASOPHILS # BLD: 0 K/UL (ref 0–0.1)
BASOPHILS NFR BLD: 0 % (ref 0–1)
BILIRUB SERPL-MCNC: 1.2 MG/DL (ref 0.2–1)
BUN SERPL-MCNC: 14 MG/DL (ref 6–20)
BUN/CREAT SERPL: 13 (ref 12–20)
CA-I BLD-MCNC: 9 MG/DL (ref 8.5–10.1)
CHLORIDE SERPL-SCNC: 116 MMOL/L (ref 97–108)
CO2 SERPL-SCNC: 26 MMOL/L (ref 21–32)
CREAT SERPL-MCNC: 1.09 MG/DL (ref 0.7–1.3)
CRP SERPL-MCNC: 9.5 MG/DL (ref 0–0.6)
DIFFERENTIAL METHOD BLD: ABNORMAL
EOSINOPHIL # BLD: 0.2 K/UL (ref 0–0.4)
EOSINOPHIL NFR BLD: 2 % (ref 0–7)
ERYTHROCYTE [DISTWIDTH] IN BLOOD BY AUTOMATED COUNT: 13.1 % (ref 11.5–14.5)
GLOBULIN SER CALC-MCNC: 4.1 G/DL (ref 2–4)
GLUCOSE SERPL-MCNC: 89 MG/DL (ref 65–100)
HCT VFR BLD AUTO: 38.2 % (ref 36.6–50.3)
HGB BLD-MCNC: 12.7 G/DL (ref 12.1–17)
IMM GRANULOCYTES # BLD AUTO: 0 K/UL (ref 0–0.04)
IMM GRANULOCYTES NFR BLD AUTO: 0 % (ref 0–0.5)
LYMPHOCYTES # BLD: 1.8 K/UL (ref 0.8–3.5)
LYMPHOCYTES NFR BLD: 18 % (ref 12–49)
MCH RBC QN AUTO: 31.7 PG (ref 26–34)
MCHC RBC AUTO-ENTMCNC: 33.2 G/DL (ref 30–36.5)
MCV RBC AUTO: 95.3 FL (ref 80–99)
MONOCYTES # BLD: 0.7 K/UL (ref 0–1)
MONOCYTES NFR BLD: 7 % (ref 5–13)
NEUTS SEG # BLD: 7.1 K/UL (ref 1.8–8)
NEUTS SEG NFR BLD: 73 % (ref 32–75)
PLATELET # BLD AUTO: 162 K/UL (ref 150–400)
PMV BLD AUTO: 11 FL (ref 8.9–12.9)
POTASSIUM SERPL-SCNC: 3.6 MMOL/L (ref 3.5–5.1)
PROCALCITONIN SERPL-MCNC: 0.29 NG/ML
PROT SERPL-MCNC: 6.9 G/DL (ref 6.4–8.2)
RBC # BLD AUTO: 4.01 M/UL (ref 4.1–5.7)
SODIUM SERPL-SCNC: 149 MMOL/L (ref 136–145)
WBC # BLD AUTO: 9.7 K/UL (ref 4.1–11.1)

## 2020-10-30 PROCEDURE — 85025 COMPLETE CBC W/AUTO DIFF WBC: CPT

## 2020-10-30 PROCEDURE — 65270000029 HC RM PRIVATE

## 2020-10-30 PROCEDURE — 74011250636 HC RX REV CODE- 250/636: Performed by: HOSPITALIST

## 2020-10-30 PROCEDURE — 87536 HIV-1 QUANT&REVRSE TRNSCRPJ: CPT

## 2020-10-30 PROCEDURE — 74011250637 HC RX REV CODE- 250/637: Performed by: NURSE PRACTITIONER

## 2020-10-30 PROCEDURE — 80053 COMPREHEN METABOLIC PANEL: CPT

## 2020-10-30 PROCEDURE — 86361 T CELL ABSOLUTE COUNT: CPT

## 2020-10-30 PROCEDURE — 74011000258 HC RX REV CODE- 258: Performed by: HOSPITALIST

## 2020-10-30 PROCEDURE — 99232 SBSQ HOSP IP/OBS MODERATE 35: CPT | Performed by: INTERNAL MEDICINE

## 2020-10-30 PROCEDURE — 74011636637 HC RX REV CODE- 636/637: Performed by: NURSE PRACTITIONER

## 2020-10-30 PROCEDURE — 84145 PROCALCITONIN (PCT): CPT

## 2020-10-30 PROCEDURE — 36415 COLL VENOUS BLD VENIPUNCTURE: CPT

## 2020-10-30 PROCEDURE — 86140 C-REACTIVE PROTEIN: CPT

## 2020-10-30 RX ADMIN — THIAMINE HCL TAB 100 MG 100 MG: 100 TAB at 10:01

## 2020-10-30 RX ADMIN — PIPERACILLIN SODIUM AND TAZOBACTAM SODIUM 3.38 G: 3; .375 INJECTION, POWDER, LYOPHILIZED, FOR SOLUTION INTRAVENOUS at 10:00

## 2020-10-30 RX ADMIN — PIPERACILLIN SODIUM AND TAZOBACTAM SODIUM 3.38 G: 3; .375 INJECTION, POWDER, LYOPHILIZED, FOR SOLUTION INTRAVENOUS at 15:03

## 2020-10-30 RX ADMIN — PANTOPRAZOLE SODIUM 40 MG: 40 GRANULE, DELAYED RELEASE ORAL at 10:00

## 2020-10-30 RX ADMIN — Medication 10 ML: at 21:58

## 2020-10-30 RX ADMIN — RALTEGRAVIR 400 MG: 400 TABLET, FILM COATED ORAL at 01:19

## 2020-10-30 RX ADMIN — RALTEGRAVIR 400 MG: 400 TABLET, FILM COATED ORAL at 21:57

## 2020-10-30 RX ADMIN — RALTEGRAVIR 400 MG: 400 TABLET, FILM COATED ORAL at 10:00

## 2020-10-30 RX ADMIN — Medication 10 ML: at 06:01

## 2020-10-30 RX ADMIN — SENNOSIDES 8.6 MG: 8.6 TABLET, FILM COATED ORAL at 01:19

## 2020-10-30 RX ADMIN — Medication 10 ML: at 01:19

## 2020-10-30 RX ADMIN — DOCUSATE SODIUM 100 MG: 50 LIQUID ORAL at 01:19

## 2020-10-30 RX ADMIN — DOCUSATE SODIUM 100 MG: 50 LIQUID ORAL at 10:00

## 2020-10-30 RX ADMIN — CYANOCOBALAMIN TAB 1000 MCG 1000 MCG: 1000 TAB at 10:00

## 2020-10-30 RX ADMIN — PREDNISONE 20 MG: 20 TABLET ORAL at 10:00

## 2020-10-30 RX ADMIN — PIPERACILLIN SODIUM AND TAZOBACTAM SODIUM 3.38 G: 3; .375 INJECTION, POWDER, LYOPHILIZED, FOR SOLUTION INTRAVENOUS at 01:19

## 2020-10-30 RX ADMIN — PIPERACILLIN SODIUM AND TAZOBACTAM SODIUM 3.38 G: 3; .375 INJECTION, POWDER, LYOPHILIZED, FOR SOLUTION INTRAVENOUS at 22:03

## 2020-10-30 RX ADMIN — ZIPRASIDONE HCL 40 MG: 40 CAPSULE ORAL at 10:00

## 2020-10-30 NOTE — PROGRESS NOTES
Pulmonary Progress Note               Daily Progress Note: 10/30/2020      Subjective: The patient is seen for follow  up. Excerpts from admission 10/28/2020 or consult notes as follows:   81year old male from [address] [place] [address] came in because of fever and vomiting he is nonverbal bedridden HIV-positive unable to get any history out of the patient he was admitted to the hospital previously because of pneumonia which was Klebsiella he was put on 100% nonrebreather mask in the emergency room which has been changed to Ventimask open eyes but does not follow any command so admitted and pulmonary consult was called to further evaluation.     Currently on Ventimask 6L. Blood gases yesterday on non-rebreather: pH 7.43, pCO2 41, pO2 145, bicarb 27, O2 sat 100%, base excess 2.8. Awaiting COVID-19 test results. Fecal occult blood was positive 10/28. Hb 10/28 14.6. Gastroenterology gave PPI and will monitor.     Problem List:  Problem List as of 10/30/2020 Date Reviewed: 10/29/2020          Codes Class Noted - Resolved    Pneumonia ICD-10-CM: J18.9  ICD-9-CM: 694  10/29/2020 - Present        SOB (shortness of breath) ICD-10-CM: R06.02  ICD-9-CM: 786.05  9/12/2020 - Present              Medications reviewed  Current Facility-Administered Medications   Medication Dose Route Frequency    piperacillin-tazobactam (ZOSYN) 3.375 g in 0.9% sodium chloride (MBP/ADV) 100 mL MBP  3.375 g IntraVENous Q8H    albuterol (PROVENTIL VENTOLIN) nebulizer solution 2.5 mg  2.5 mg Nebulization Q6H PRN    cyanocobalamin tablet 1,000 mcg  1,000 mcg Oral DAILY    docusate (COLACE) 50 mg/5 mL oral liquid 100 mg  100 mg Per G Tube BID    emtricitabine-tenofovir (TDF) (TRUVADA) 200-300 mg per tablet 1 Tab  1 Tab Oral DAILY    fentaNYL (DURAGESIC) 25 mcg/hr patch 1 Patch  1 Patch TransDERmal Q72H    pantoprazole (PROTONIX) granules for oral suspension 40 mg  40 mg Per G Tube ACB    raltegravir (ISENTRESS) tablet 400 mg  400 mg Oral BID    senna (SENOKOT) tablet 8.6 mg  1 Tab Per G Tube QHS    thiamine mononitrate (B-1) tablet 100 mg  100 mg Per G Tube DAILY    ziprasidone (GEODON) capsule 40 mg  40 mg Oral DAILY    sodium chloride (NS) flush 5-40 mL  5-40 mL IntraVENous Q8H    sodium chloride (NS) flush 5-40 mL  5-40 mL IntraVENous PRN    acetaminophen (TYLENOL) tablet 650 mg  650 mg Per J Tube Q4H PRN    predniSONE (DELTASONE) tablet 20 mg  20 mg Oral DAILY    acetaminophen (TYLENOL) suppository 650 mg  650 mg Rectal Q4H PRN    ondansetron (ZOFRAN) injection 4 mg  4 mg IntraVENous Q6H PRN       Review of Systems:   Unable to obtain    Objective:   Physical Exam:     Visit Vitals  /83   Pulse 93   Temp 97.9 °F (36.6 °C)   Resp 20   Ht 6' 0.01\" (1.829 m)   Wt 79.4 kg (175 lb)   SpO2 95%   BMI 23.73 kg/m²    O2 Flow Rate (L/min): 6 l/min O2 Device: Ventimask    Temp (24hrs), Av.9 °F (37.2 °C), Min:97.9 °F (36.6 °C), Max:99.6 °F (37.6 °C)    No intake/output data recorded. 10/28 190 - 10/30 0700  In: 1100 [I.V.:1100]  Out: 1075 [Urine:1075]    Constitutional: He appears distressed. Nonverbal   HENT:   Head: Normocephalic and atraumatic. Neck: Normal range of motion. Cardiovascular: Normal rate and normal heart sounds. Pulmonary/Chest: He is in respiratory distress. He has rales. Abdominal: Bowel sounds are normal.   Musculoskeletal: Normal range of motion.    Neurological:   Nonverbal       Data Review:       Recent Days:  Recent Labs     10/30/20  0740 10/28/20  2155   WBC 9.7 15.9*   HGB 12.7 14.6   HCT 38.2 42.9    157     Recent Labs     10/30/20  0740 10/28/20  2155   * 148*   K 3.6 3.6   * 113*   CO2 26 26   GLU 89 102*   BUN 14 18   CREA 1.09 0.98   CA 9.0 8.9   ALB 2.8* 3.1*   TBILI 1.2* 0.6   ALT 17 22     Recent Labs     10/29/20  0953   PH 7.430   PCO2 41   PO2 145*   HCO3 27*       24 Hour Results:  Recent Results (from the past 24 hour(s))   METABOLIC PANEL, COMPREHENSIVE Collection Time: 10/30/20  7:40 AM   Result Value Ref Range    Sodium 149 (H) 136 - 145 mmol/L    Potassium 3.6 3.5 - 5.1 mmol/L    Chloride 116 (H) 97 - 108 mmol/L    CO2 26 21 - 32 mmol/L    Anion gap 7 5 - 15 mmol/L    Glucose 89 65 - 100 mg/dL    BUN 14 6 - 20 mg/dL    Creatinine 1.09 0.70 - 1.30 mg/dL    BUN/Creatinine ratio 13 12 - 20      GFR est AA >60 >60 ml/min/1.73m2    GFR est non-AA >60 >60 ml/min/1.73m2    Calcium 9.0 8.5 - 10.1 mg/dL    Bilirubin, total 1.2 (H) 0.2 - 1.0 mg/dL    AST (SGOT) 15 15 - 37 U/L    ALT (SGPT) 17 12 - 78 U/L    Alk. phosphatase 59 45 - 117 U/L    Protein, total 6.9 6.4 - 8.2 g/dL    Albumin 2.8 (L) 3.5 - 5.0 g/dL    Globulin 4.1 (H) 2.0 - 4.0 g/dL    A-G Ratio 0.7 (L) 1.1 - 2.2     CBC WITH AUTOMATED DIFF    Collection Time: 10/30/20  7:40 AM   Result Value Ref Range    WBC 9.7 4.1 - 11.1 K/uL    RBC 4.01 (L) 4.10 - 5.70 M/uL    HGB 12.7 12.1 - 17.0 g/dL    HCT 38.2 36.6 - 50.3 %    MCV 95.3 80.0 - 99.0 FL    MCH 31.7 26.0 - 34.0 PG    MCHC 33.2 30.0 - 36.5 g/dL    RDW 13.1 11.5 - 14.5 %    PLATELET 760 031 - 516 K/uL    MPV 11.0 8.9 - 12.9 FL    NEUTROPHILS 73 32 - 75 %    LYMPHOCYTES 18 12 - 49 %    MONOCYTES 7 5 - 13 %    EOSINOPHILS 2 0 - 7 %    BASOPHILS 0 0 - 1 %    IMMATURE GRANULOCYTES 0 0.0 - 0.5 %    ABS. NEUTROPHILS 7.1 1.8 - 8.0 K/UL    ABS. LYMPHOCYTES 1.8 0.8 - 3.5 K/UL    ABS. MONOCYTES 0.7 0.0 - 1.0 K/UL    ABS. EOSINOPHILS 0.2 0.0 - 0.4 K/UL    ABS. BASOPHILS 0.0 0.0 - 0.1 K/UL    ABS. IMM.  GRANS. 0.0 0.00 - 0.04 K/UL    DF AUTOMATED     C REACTIVE PROTEIN, QT    Collection Time: 10/30/20  7:40 AM   Result Value Ref Range    C-Reactive protein 9.50 (H) 0.00 - 0.60 mg/dL   PROCALCITONIN    Collection Time: 10/30/20  7:40 AM   Result Value Ref Range    Procalcitonin 0.29 (H) 0 ng/mL     Imaging:             CXR Results  (Last 48 hours)                 10/28/20 2236   XR CHEST SNGL V Final result     Impression:   IMPRESSION: There is persistent airspace disease within the lung bases without   resolution when compared to prior imaging. Although these findings could   represent recurrent aspiration pneumonia other chronic airspace processes cannot   be fully excluded. 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        Narrative:   This study is a portable radiograph of the chest dated 10/28/2020. HISTORY: Fever aspiration most likely. COMPARISON: 9/10/2020. FINDINGS: There is normal size to the cardiac/pericardial silhouette. The   pulmonary vessels are normal in caliber. There is airspace disease within the mid to lower lung zones associated with an   elevation of the hemidiaphragms. There are similar airspace disease in prior   imaging although there is a greater component of airspace disease within the mid   lung zones and now the airspace process is limited to the lung bases. The costophrenic angles are maintained without pleural effusions. There is   mild-to-moderate ectasia of the thoracic aorta. Otherwise, the mediastinal   structures are normal. The bony thorax is intact.                      Results from Hospital Encounter encounter on 10/28/20   XR CHEST SNGL V     Narrative This study is a portable radiograph of the chest dated 10/28/2020.     HISTORY: Fever aspiration most likely.     COMPARISON: 9/10/2020.     FINDINGS: There is normal size to the cardiac/pericardial silhouette. The  pulmonary vessels are normal in caliber.     There is airspace disease within the mid to lower lung zones associated with an  elevation of the hemidiaphragms. There are similar airspace disease in prior  imaging although there is a greater component of airspace disease within the mid  lung zones and now the airspace process is limited to the lung bases.     The costophrenic angles are maintained without pleural effusions. There is  mild-to-moderate ectasia of the thoracic aorta. Otherwise, the mediastinal  structures are normal. The bony thorax is intact.        Impression IMPRESSION: There is persistent airspace disease within the lung bases without  resolution when compared to prior imaging. Although these findings could  represent recurrent aspiration pneumonia other chronic airspace processes cannot  be fully excluded. Cryptogenic organized pneumonia can present with chronic  peribronchial and perivascular parenchymal opacification.      No results found for this or any previous visit.        IMPRESSION:   1. Acute hypoxic respiratory failure   2. Aspiration pneumonia   3. HIV positive  4. History of hepatitis C  5. Pt is requiring Drug therapy requiring intensive monitoring for toxicity  6. Pt is unstable, unpredictable needing inpatient monitoring; is acutely ill and at high risk of sudden decline and decompensation with severe consequenses and continued end organ dysfunction and failure  7. Prognosis guarded        RECOMMENDATIONS/PLAN:      1. Patient is Ventimask  2. Agree with Empiric IV antibiotics pending culture results   3. Follow culture results cxr shows bilateral lower lobe infiltrate  4. Continue antiretroviral therapy for HIV  5.  Suspected COVID-19 results pending                   Sebastian West MD

## 2020-10-30 NOTE — PROGRESS NOTES
Infectious Disease Progress Note               Subjective:   Pt seen and examined at bedside. No acute events since last seen. Non-verbal   Objective:   Physical Exam:     Visit Vitals  /83   Pulse 93   Temp 97.9 °F (36.6 °C)   Resp 20   Ht 6' 0.01\" (1.829 m)   Wt 79.4 kg (175 lb)   SpO2 95%   BMI 23.73 kg/m²    O2 Flow Rate (L/min): 6 l/min O2 Device: Ventimask    Temp (24hrs), Av.9 °F (36.6 °C), Min:97.9 °F (36.6 °C), Max:97.9 °F (36.6 °C)    No intake/output data recorded. 10/28 1901 - 10/30 0700  In: 1100 [I.V.:1100]  Out: 1075 [Urine:1075]    General: NAD, alert, non-conversant   HEENT: RENAY, Moist mucosa   Lungs: CTA b/l, decreased at the bases no wheeze/rhonchi   Heart: S1S2+, RRR, no murmur  Abdo: Soft, NT, ND, +BS, + peg tube   :  + indwelling izaguirre   Exts: No edema, + pulses b/l   Skin: No wounds, No rashes or lesions      Data Review:       Recent Days:  Recent Labs     10/30/20  0740 10/28/20  2155   WBC 9.7 15.9*   HGB 12.7 14.6   HCT 38.2 42.9    157     Recent Labs     10/30/20  0740 10/28/20  2155   BUN 14 18   CREA 1.09 0.98       Lab Results   Component Value Date/Time    C-Reactive protein 9.50 (H) 10/30/2020 07:40 AM       Microbiology   - Blood Cx 10/28: No growth     Diagnostics   CXR Results  (Last 48 hours)               10/28/20 2236  XR CHEST SNGL V Final result    Impression:  IMPRESSION: There is persistent airspace disease within the lung bases without   resolution when compared to prior imaging. Although these findings could   represent recurrent aspiration pneumonia other chronic airspace processes cannot   be fully excluded. 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 Narrative: This study is a portable radiograph of the chest dated 10/28/2020. HISTORY: Fever aspiration most likely. COMPARISON: 9/10/2020.        FINDINGS: There is normal size to the cardiac/pericardial silhouette. The   pulmonary vessels are normal in caliber. There is airspace disease within the mid to lower lung zones associated with an   elevation of the hemidiaphragms. There are similar airspace disease in prior   imaging although there is a greater component of airspace disease within the mid   lung zones and now the airspace process is limited to the lung bases. The costophrenic angles are maintained without pleural effusions. There is   mild-to-moderate ectasia of the thoracic aorta. Otherwise, the mediastinal   structures are normal. The bony thorax is intact. Assessment/Plan     1. Acute hypoxia due aspiration PNA, recent admission for the same      Persistent airspace disease within the lung bases on CXR concerning for chronic aspiration       Pt wasn't on Ventimask during my assessment      Afebrile today and leukocytosis is trending down       Low suspicion for COVID-19, but will await testing       Continue on Zosyn monotherapy day # 2. Routine labs in the morning      3. HIV infection: Continue maintenance medications, isentress, and Truvada. HIV RNA VL and T cell count are pending      4. Dysphagia: S/p peg tube placement      5. H/o  transitional cell bladder Ca per records      6. H/o chronic hep C, unclear if treated in the past, check HCV RNA VL w morning labs      7.  AMS/decreased responsiveness: Baseline mentation unknown     Laura Soto MD    10/30/2020

## 2020-10-30 NOTE — PROGRESS NOTES
2 Person skin assessment completed with [facility] RN,  Closed pressure injury noted to right outer heel. No other skin issues noted.

## 2020-10-30 NOTE — PROGRESS NOTES
Comprehensive Nutrition Assessment    Type and Reason for Visit: Initial(Tube feeding)    Nutrition Recommendations/Plan:   As medically able, initiate PEG TF of Jevity 1.5 at 20ml/hr   Advance by 10ml q4hrs to goal of 65ml/hr   Flush with 200mL free water q4hr   Goal feeds provide 2340kcal, 100g pro, 2385mL fluid (meeting ~100% est needs)    Nutrition Assessment:  Admitted for GIB, GI consulted and with no plans for scopes at this time. Awaiting COVID results, noted pt recently positive pta. Pt inappropriate for interview, RD discussed with RN who is unsure of plan to feed though notes GIB appears resolved, RD discussed with NP who confirms to put in TF order. Noted pt severely underfed pta, unsure why, new regimen appropriately feeds pt. Labs: Na 148, . Meds: B12, docusate, zofran, ppi, zosyn, prednisone, raltegravir, senna, B1, geodon. Malnutrition Assessment:  Malnutrition Status:  Mild malnutrition    Context:  Chronic illness     Findings of the 6 clinical characteristics of malnutrition:   Energy Intake:  7 - 75% or less est energy requirements for 1 month or longer  Weight Loss:  Mild weight loss (specify amount and time period)(2.8kg wt loss in 1 month (3.5%, not significant))     Body Fat Loss:  Unable to assess,     Muscle Mass Loss:  Unable to assess,    Fluid Accumulation:  No significant fluid accumulation,      Estimated Daily Nutrient Needs:  Energy (kcal): 2382kcals (30kcals/kg); Weight Used for Energy Requirements: Current  Protein (g): 95g (1.2g/kg); Weight Used for Protein Requirements: Current  Fluid (ml/day): 2382ml; Weight Used for Fluid Requirements: Current   Needs for maintenance vs gain with HIV, Hep C    Nutrition Related Findings:  Unable to complete NFPE 2/2 COVID precautions, noted from EMR that pt rigid,  semi-contracted, and with deformity. No edema. Last BM pta, none today per RN. No further coffee-ground emesis today per RN as well.  Noted hx dysphagia, pt NPO with PEG TF. Wounds:  Deep tissue injury(R Heel)       Current Nutrition Therapies:  DIET NPO  Current Tube Feeding (TF) Orders:   · Goal TF & Flush Orders Provides: Rec'd TF of Jevity 1.5 at goal of 65ml/hr continuous, flush with 200ml water q4h; Providing 2340kcals, 100g protein, and 2385ml fluid.     Anthropometric Measures:  · Height:  6' 0.01\" (182.9 cm)  · Current Body Wt:  79.4 kg (175 lb 0.7 oz)(10/28) 79.4kg  · Admission Body Wt:  175 lb 0.7 oz    · Usual Body Wt:  (SONI)     · Ideal Body Wt:  178 lbs:  98.3 %   · BMI Category:  Normal weight (BMI 22.0-24.9) age over 72     Wt hx: 82.2kg (9/27), 81.8kg (9/23),   Pt with 2.8kg wt loss in 1 month (3.5%, not significant)    Nutrition Diagnosis:   · Inadequate protein-energy intake related to inadequate enteral nutrition infusion as evidenced by nutrition support-enteral nutrition    Nutrition Interventions:   Food and/or Nutrient Delivery: Start tube feeding  Nutrition Education and Counseling: Education not indicated  Coordination of Nutrition Care: Continue to monitor while inpatient    Goals:  Meet >75% EENs via TF x7 days  Wt maintenance vs gain +/- 0.5kg per week       Nutrition Monitoring and Evaluation:   Behavioral-Environmental Outcomes: None identified  Food/Nutrient Intake Outcomes: Enteral nutrition intake/tolerance  Physical Signs/Symptoms Outcomes: Weight, Skin, GI status    Discharge Planning:    Enteral nutrition     Electronically signed by Davina Rossi RD on 10/30/2020 at 7:57 AM    Contact: Ext [phone]

## 2020-10-30 NOTE — PROGRESS NOTES
Hospitalist Progress Note    Subjective:   Daily Progress Note: 10/30/2020 12:05 PM    Hospital Course:  81 y. o. male nonverbal noncommunicative with chronically bedridden status, HIV, recent [phone] positive presents to the emergency room from [facility] by emergency crew as they found him vomiting coffee-ground emesis.  Suspected of aspiration pneumonia.  This patient was recently hospitalized for more than a month due to Klebsiella pneumonia pneumonia that was treated with improvement.  He presented with acute respiratory failure last admission.  Now currently he is on nonrebreather mask saturating 100%, he just awake with eyes open but does not understand anything. Chest x-ray suggestive of no improvement from pneumonia findings from previous x-ray.     Subjective: PT seen in room, alert to name, awake, non verbal.    Current Facility-Administered Medications   Medication Dose Route Frequency    piperacillin-tazobactam (ZOSYN) 3.375 g in 0.9% sodium chloride (MBP/ADV) 100 mL MBP  3.375 g IntraVENous Q8H    albuterol (PROVENTIL VENTOLIN) nebulizer solution 2.5 mg  2.5 mg Nebulization Q6H PRN    cyanocobalamin tablet 1,000 mcg  1,000 mcg Oral DAILY    docusate (COLACE) 50 mg/5 mL oral liquid 100 mg  100 mg Per G Tube BID    emtricitabine-tenofovir (TDF) (TRUVADA) 200-300 mg per tablet 1 Tab  1 Tab Oral DAILY    fentaNYL (DURAGESIC) 25 mcg/hr patch 1 Patch  1 Patch TransDERmal Q72H    pantoprazole (PROTONIX) granules for oral suspension 40 mg  40 mg Per G Tube ACB    raltegravir (ISENTRESS) tablet 400 mg  400 mg Oral BID    senna (SENOKOT) tablet 8.6 mg  1 Tab Per G Tube QHS    thiamine mononitrate (B-1) tablet 100 mg  100 mg Per G Tube DAILY    ziprasidone (GEODON) capsule 40 mg  40 mg Oral DAILY    sodium chloride (NS) flush 5-40 mL  5-40 mL IntraVENous Q8H    sodium chloride (NS) flush 5-40 mL  5-40 mL IntraVENous PRN    acetaminophen (TYLENOL) tablet 650 mg  650 mg Per J Tube Q4H PRN    predniSONE (DELTASONE) tablet 20 mg  20 mg Oral DAILY    acetaminophen (TYLENOL) suppository 650 mg  650 mg Rectal Q4H PRN    ondansetron (ZOFRAN) injection 4 mg  4 mg IntraVENous Q6H PRN        Review of Systems:    ROS   Not able to obtain due to non verbal  Objective:     Visit Vitals  /83   Pulse 93   Temp 97.9 °F (36.6 °C)   Resp 20   Ht 6' 0.01\" (1.829 m)   Wt 79.4 kg (175 lb)   SpO2 95%   BMI 23.73 kg/m²    O2 Flow Rate (L/min): 6 l/min O2 Device: Ventimask    Temp (24hrs), Av.7 °F (37.1 °C), Min:97.9 °F (36.6 °C), Max:99.6 °F (37.6 °C)      No intake/output data recorded. 10/28 1901 - 10/30 0700  In: 1100 [I.V.:1100]  Out: 1075 [Urine:1075]    PHYSICAL EXAM:    Physical Exam   Constitutional: He appears well-developed. No distress. Cardiovascular: Normal rate, regular rhythm and intact distal pulses. Murmur heard. Pulmonary/Chest:. He has rhonchi in the right middle field and the left middle field. Abdominal: Soft. Bowel sounds are normal.   PEG tube intact   Genitourinary:    Penis and rectum normal.      Genitourinary Comments: Clemons catheter patent, clear yellow urine    Musculoskeletal:         General: Deformity present. Comments: Rigid,  Semi contracted with arms   Neurological: He is alert. Non verbal   Skin: Skin is warm and dry.      Data Review    Recent Results (from the past 24 hour(s))   METABOLIC PANEL, COMPREHENSIVE    Collection Time: 10/30/20  7:40 AM   Result Value Ref Range    Sodium 149 (H) 136 - 145 mmol/L    Potassium 3.6 3.5 - 5.1 mmol/L    Chloride 116 (H) 97 - 108 mmol/L    CO2 26 21 - 32 mmol/L    Anion gap 7 5 - 15 mmol/L    Glucose 89 65 - 100 mg/dL    BUN 14 6 - 20 mg/dL    Creatinine 1.09 0.70 - 1.30 mg/dL    BUN/Creatinine ratio 13 12 - 20      GFR est AA >60 >60 ml/min/1.73m2    GFR est non-AA >60 >60 ml/min/1.73m2    Calcium 9.0 8.5 - 10.1 mg/dL    Bilirubin, total 1.2 (H) 0.2 - 1.0 mg/dL    AST (SGOT) 15 15 - 37 U/L    ALT (SGPT) 17 12 - 78 U/L    Alk. phosphatase 59 45 - 117 U/L    Protein, total 6.9 6.4 - 8.2 g/dL    Albumin 2.8 (L) 3.5 - 5.0 g/dL    Globulin 4.1 (H) 2.0 - 4.0 g/dL    A-G Ratio 0.7 (L) 1.1 - 2.2     CBC WITH AUTOMATED DIFF    Collection Time: 10/30/20  7:40 AM   Result Value Ref Range    WBC 9.7 4.1 - 11.1 K/uL    RBC 4.01 (L) 4.10 - 5.70 M/uL    HGB 12.7 12.1 - 17.0 g/dL    HCT 38.2 36.6 - 50.3 %    MCV 95.3 80.0 - 99.0 FL    MCH 31.7 26.0 - 34.0 PG    MCHC 33.2 30.0 - 36.5 g/dL    RDW 13.1 11.5 - 14.5 %    PLATELET 770 094 - 659 K/uL    MPV 11.0 8.9 - 12.9 FL    NEUTROPHILS 73 32 - 75 %    LYMPHOCYTES 18 12 - 49 %    MONOCYTES 7 5 - 13 %    EOSINOPHILS 2 0 - 7 %    BASOPHILS 0 0 - 1 %    IMMATURE GRANULOCYTES 0 0.0 - 0.5 %    ABS. NEUTROPHILS 7.1 1.8 - 8.0 K/UL    ABS. LYMPHOCYTES 1.8 0.8 - 3.5 K/UL    ABS. MONOCYTES 0.7 0.0 - 1.0 K/UL    ABS. EOSINOPHILS 0.2 0.0 - 0.4 K/UL    ABS. BASOPHILS 0.0 0.0 - 0.1 K/UL    ABS. IMM. GRANS. 0.0 0.00 - 0.04 K/UL    DF AUTOMATED     C REACTIVE PROTEIN, QT    Collection Time: 10/30/20  7:40 AM   Result Value Ref Range    C-Reactive protein 9.50 (H) 0.00 - 0.60 mg/dL   PROCALCITONIN    Collection Time: 10/30/20  7:40 AM   Result Value Ref Range    Procalcitonin 0.29 (H) 0 ng/mL       XR CHEST SNGL V   Final Result   IMPRESSION: There is persistent airspace disease within the lung bases without   resolution when compared to prior imaging. Although these findings could   represent recurrent aspiration pneumonia other chronic airspace processes cannot   be fully excluded. 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 Active Problems:    Pneumonia (10/29/2020)        Assessment/Plan:     1. Aspiration pneumonia- ID following, on IV zosyn,  Pulmonary following,  wean O2 as tolerated. CXR showing recurrent aspiration,  covid 19 test pending.      2.  Advanced dementia/dysphagia- PEG feedings, nutrition consulted,  Start TF and water flushes per nutrition recs.      3.  History of schizophrenia- on geodon.     4. History of HIV- continue medications.     5. GI prophylaxis- PPI.     6. Hematemesis/occult blood positive- GI following, no further hematemesis,  Follow cbc         DVT Prophylaxis: none due to chronic immobilty  Code Status:  Partial Code  POA: Maribell Perez @ [phone]) & leslie Leo @ [phone])    Care Plan discussed with:   ____staff nurse, pulmonary , case management___________________________________________________________    Concepcion Phillip NP

## 2020-10-30 NOTE — CONSULTS
Consult    Patient: Suyapa Templeton MRN: [id number]  SSN: [id number]    YOB: 1950  Age: 81 y.o. Sex: male      Subjective:      Suyapa Templeton is a 81 y.o. male who is being seen for hematemesis, possible GI bleeding, history from the chart. recent COVID-19 positiveThis patient was recently hospitalized for more than a month due to Klebsiella pneumonia pneumonia  presents to the emergency room from [facility] by emergency crew as they found him vomiting coffee-ground emesis.  Suspected of aspiration pneumonia.  No documented red blood per rectum,    Past Medical History:   Diagnosis Date    Bed confinement status     Dementia ([facility]     Hepatitis C     HIV (human immunodeficiency virus infection) ([facility]     Schizophrenia ([facility]      History reviewed. No pertinent surgical history.    Family History   Family history unknown: Yes     Social History     Tobacco Use    Smoking status: Never Smoker    Smokeless tobacco: Never Used   Substance Use Topics    Alcohol use: Never     Frequency: Never      Current Facility-Administered Medications   Medication Dose Route Frequency Provider Last Rate Last Dose    piperacillin-tazobactam (ZOSYN) 3.375 g in 0.9% sodium chloride (MBP/ADV) 100 mL MBP  3.375 g IntraVENous Q8H Meena Self MD 25 mL/hr at 10/29/20 1509 3.375 g at 10/29/20 1509    albuterol (PROVENTIL VENTOLIN) nebulizer solution 2.5 mg  2.5 mg Nebulization Q6H PRN Bryan Fung NP        cyanocobalamin tablet 1,000 mcg  1,000 mcg Oral DAILY Bryan Fung NP   1,000 mcg at 10/29/20 1400    docusate (COLACE) 50 mg/5 mL oral liquid 100 mg  100 mg Per G Tube BID Bryan Fung NP   100 mg at 10/29/20 1006    emtricitabine-tenofovir (TDF) (TRUVADA) 200-300 mg per tablet 1 Tab  1 Tab Oral DAILY Bryan Fung NP   1 Tab at 10/29/20 1006    fentaNYL (DURAGESIC) 25 mcg/hr patch 1 Patch  1 Patch TransDERmal Q72H Bryan Fung NP        pantoprazole (PROTONIX) granules for oral suspension 40 mg  40 mg Per G Tube ACB Bryan Fung NP   40 mg at 10/29/20 1006    raltegravir (ISENTRESS) tablet 400 mg  400 mg Oral BID Bryan Fung NP   400 mg at 10/29/20 1006    senna (SENOKOT) tablet 8.6 mg  1 Tab Per G Tube QHS Bryan Fung NP        thiamine mononitrate (B-1) tablet 100 mg  100 mg Per G Tube DAILY Bryan Fung NP   100 mg at 10/29/20 1006    ziprasidone (GEODON) capsule 40 mg  40 mg Oral DAILY Bryan Fung NP   40 mg at 10/29/20 1005    sodium chloride (NS) flush 5-40 mL  5-40 mL IntraVENous Q8H Dexter Garcia MD   10 mL at 10/29/20 1937    sodium chloride (NS) flush 5-40 mL  5-40 mL IntraVENous PRN Dexter Garcia MD        acetaminophen (TYLENOL) tablet 650 mg  650 mg Per J Tube Q4H PRN Bryan Fung NP        [START ON 10/30/2020] predniSONE (DELTASONE) tablet 20 mg  20 mg Oral DAILY Bryan Fung NP        acetaminophen (TYLENOL) suppository 650 mg  650 mg Rectal Q4H PRN Bryan Fung NP        ondansetron [facility]) injection 4 mg  4 mg IntraVENous Q6H PRN Bryan Fung NP            Allergies   Allergen Reactions    Sclavotest-Ppd Unknown (comments)       Review of Systems:  Review of Systems   Unable to perform ROS: Mental acuity        Objective:     Vitals:    10/29/20 1430 10/29/20 1545 10/29/20 1930 10/29/20 2058   BP: 109/75 103/61 129/78    Pulse: 81 86 82    Resp: 24 22 20    Temp: 99.6 °F (37.6 °C) 99.4 °F (37.4 °C) 97.9 °F (36.6 °C)    SpO2: 98% 95% 95% 95%   Weight:       Height:            Physical Exam:  Physical Exam   Constitutional: He appears lethargic. He is sleeping. HENT:   Head: Microcephalic. Cardiovascular: Regular rhythm and normal pulses. Pulmonary/Chest: No bradypnea. No respiratory distress. Abdominal: Soft. He exhibits no distension, no pulsatile liver, no fluid wave, no ascites and no mass. Bowel sounds are increased. There is abdominal tenderness in the epigastric area. Musculoskeletal:      Right shoulder: He exhibits deformity and spasm. Neurological: He appears lethargic. Recent Results (from the past 24 hour(s))   BNP    Collection Time: 10/28/20  9:55 PM   Result Value Ref Range    NT pro- (H) <125 pg/mL   CBC WITH AUTOMATED DIFF    Collection Time: 10/28/20  9:55 PM   Result Value Ref Range    WBC 15.9 (H) 4.1 - 11.1 K/uL    RBC 4.57 4.10 - 5.70 M/uL    HGB 14.6 12.1 - 17.0 g/dL    HCT 42.9 36.6 - 50.3 %    MCV 93.9 80.0 - 99.0 FL    MCH 31.9 26.0 - 34.0 PG    MCHC 34.0 30.0 - 36.5 g/dL    RDW 13.1 11.5 - 14.5 %    PLATELET 480 742 - 670 K/uL    MPV 11.0 8.9 - 12.9 FL    NEUTROPHILS 85 (H) 32 - 75 %    LYMPHOCYTES 10 (L) 12 - 49 %    MONOCYTES 5 5 - 13 %    EOSINOPHILS 0 0 - 7 %    BASOPHILS 0 0 - 1 %    IMMATURE GRANULOCYTES 0 0.0 - 0.5 %    ABS. NEUTROPHILS 13.4 (H) 1.8 - 8.0 K/UL    ABS. LYMPHOCYTES 1.6 0.8 - 3.5 K/UL    ABS. MONOCYTES 0.9 0.0 - 1.0 K/UL    ABS. EOSINOPHILS 0.0 0.0 - 0.4 K/UL    ABS. BASOPHILS 0.0 0.0 - 0.1 K/UL    ABS. IMM. GRANS. 0.1 (H) 0.00 - 0.04 K/UL    DF AUTOMATED     METABOLIC PANEL, COMPREHENSIVE    Collection Time: 10/28/20  9:55 PM   Result Value Ref Range    Sodium 148 (H) 136 - 145 mmol/L    Potassium 3.6 3.5 - 5.1 mmol/L    Chloride 113 (H) 97 - 108 mmol/L    CO2 26 21 - 32 mmol/L    Anion gap 9 5 - 15 mmol/L    Glucose 102 (H) 65 - 100 mg/dL    BUN 18 6 - 20 mg/dL    Creatinine 0.98 0.70 - 1.30 mg/dL    BUN/Creatinine ratio 18 12 - 20      GFR est AA >60 >60 ml/min/1.73m2    GFR est non-AA >60 >60 ml/min/1.73m2    Calcium 8.9 8.5 - 10.1 mg/dL    Bilirubin, total 0.6 0.2 - 1.0 mg/dL    AST (SGOT) 16 15 - 37 U/L    ALT (SGPT) 22 12 - 78 U/L    Alk.  phosphatase 69 45 - 117 U/L    Protein, total 7.5 6.4 - 8.2 g/dL    Albumin 3.1 (L) 3.5 - 5.0 g/dL    Globulin 4.4 (H) 2.0 - 4.0 g/dL    A-G Ratio 0.7 (L) 1.1 - 2.2     LACTIC ACID    Collection Time: 10/28/20  9:55 PM   Result Value Ref Range    Lactic acid 1.6 0.4 - 2.0 mmol/L TROPONIN I    Collection Time: 10/28/20  9:55 PM   Result Value Ref Range    Troponin-I, Qt. <0.05 <0.05 ng/mL   OCCULT BLOOD, STOOL    Collection Time: 10/28/20 10:15 PM   Result Value Ref Range    Occult Blood,day 1 Positive (A) Negative      Day 1 date: Positive     TYPE & SCREEN    Collection Time: 10/28/20 11:06 PM   Result Value Ref Range    Crossmatch Expiration 10/31/2020,2359     ABO/Rh(D) AB Negative     Antibody screen Negative    SARS-COV-2    Collection Time: 10/29/20  9:15 AM   Result Value Ref Range    SARS-CoV-2 Please find results under separate order     BLOOD GAS, ARTERIAL    Collection Time: 10/29/20  9:53 AM   Result Value Ref Range    pH 7.430 7.35 - 7.45      PCO2 41 35 - 45 mmHg    PO2 145 (H) 75 - 100 mmHg    O2  >95 %    BICARBONATE 27 (H) 22 - 26 mmol/L    BASE EXCESS 2.8 (H) 0 - 2 mmol/L    O2 METHOD Non-rebreathing mask      O2 FLOW RATE 5.0 L/min    EPAP/CPAP/PEEP 0      SITE Right Radial      TIFFANIE'S TEST PASS          XR CHEST SNGL V   Final Result   IMPRESSION: There is persistent airspace disease within the lung bases without   resolution when compared to prior imaging. Although these findings could   represent recurrent aspiration pneumonia other chronic airspace processes cannot   be fully excluded. 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 Assessment:     Hospital Problems  Date Reviewed: 10/29/2020          Codes Class Noted POA    Pneumonia ICD-10-CM: J18.9  ICD-9-CM: 927  10/29/2020 Unknown          Advanced dementia/dysphagia- PEG feedings, nutrition consulted   Hematemesis/occult blood positive-   Plan:     Continue on. PPI  Monitory HB  Iv zosyn   We will follow the patient to see if endoscopy studies is needed         Signed By: Lizandro Barrientos MD     October 29, 2020         Thank you for allowing me to participate in this patients care  Cc Referring Physician   Fredy Matias MD

## 2020-10-30 NOTE — PROGRESS NOTES
Pulmonary Progress Note               Daily Progress Note: 10/30/2020      Subjective: The patient is seen for follow  up. Excerpts from admission 10/28/2020 or consult notes as follows:   81year old male from [address] [place] [address] came in because of fever and vomiting he is nonverbal bedridden HIV-positive unable to get any history out of the patient he was admitted to the hospital previously because of pneumonia which was Klebsiella he was put on 100% nonrebreather mask in the emergency room which has been changed to Ventimask open eyes but does not follow any command so admitted and pulmonary consult was called to further evaluation.     Currently on Ventimask 6L. Blood gases yesterday on non-rebreather: pH 7.43, pCO2 41, pO2 145, bicarb 27, O2 sat 100%, base excess 2.8. Awaiting COVID-19 test results. Fecal occult blood was positive 10/28. Hb 10/28 14.6. Gastroenterology gave PPI and will monitor.     Problem List:  Problem List as of 10/30/2020 Date Reviewed: 10/29/2020          Codes Class Noted - Resolved    Pneumonia ICD-10-CM: J18.9  ICD-9-CM: 973  10/29/2020 - Present        SOB (shortness of breath) ICD-10-CM: R06.02  ICD-9-CM: 786.05  9/12/2020 - Present              Medications reviewed  Current Facility-Administered Medications   Medication Dose Route Frequency    piperacillin-tazobactam (ZOSYN) 3.375 g in 0.9% sodium chloride (MBP/ADV) 100 mL MBP  3.375 g IntraVENous Q8H    albuterol (PROVENTIL VENTOLIN) nebulizer solution 2.5 mg  2.5 mg Nebulization Q6H PRN    cyanocobalamin tablet 1,000 mcg  1,000 mcg Oral DAILY    docusate (COLACE) 50 mg/5 mL oral liquid 100 mg  100 mg Per G Tube BID    emtricitabine-tenofovir (TDF) (TRUVADA) 200-300 mg per tablet 1 Tab  1 Tab Oral DAILY    fentaNYL (DURAGESIC) 25 mcg/hr patch 1 Patch  1 Patch TransDERmal Q72H    pantoprazole (PROTONIX) granules for oral suspension 40 mg  40 mg Per G Tube ACB    raltegravir (ISENTRESS) tablet 400 mg  400 mg Oral BID    senna (SENOKOT) tablet 8.6 mg  1 Tab Per G Tube QHS    thiamine mononitrate (B-1) tablet 100 mg  100 mg Per G Tube DAILY    ziprasidone (GEODON) capsule 40 mg  40 mg Oral DAILY    sodium chloride (NS) flush 5-40 mL  5-40 mL IntraVENous Q8H    sodium chloride (NS) flush 5-40 mL  5-40 mL IntraVENous PRN    acetaminophen (TYLENOL) tablet 650 mg  650 mg Per J Tube Q4H PRN    predniSONE (DELTASONE) tablet 20 mg  20 mg Oral DAILY    acetaminophen (TYLENOL) suppository 650 mg  650 mg Rectal Q4H PRN    ondansetron (ZOFRAN) injection 4 mg  4 mg IntraVENous Q6H PRN       Review of Systems:   Unable to obtain    Objective:   Physical Exam:     Visit Vitals  /78 (BP 1 Location: Left arm, BP Patient Position: At rest)   Pulse 82   Temp 97.9 °F (36.6 °C)   Resp 20   Ht 6' (1.829 m)   Wt 175 lb (79.4 kg)   SpO2 95%   BMI 23.73 kg/m²    O2 Flow Rate (L/min): 6 l/min O2 Device: Ventimask    Temp (24hrs), Av.2 °F (37.3 °C), Min:97.9 °F (36.6 °C), Max:99.6 °F (37.6 °C)    No intake/output data recorded. 10/28 1901 - 10/30 0700  In: 1100 [I.V.:1100]  Out: 1075 [Urine:1075]    Constitutional: He appears distressed. Nonverbal   HENT:   Head: Normocephalic and atraumatic. Neck: Normal range of motion. Cardiovascular: Normal rate and normal heart sounds. Pulmonary/Chest: He is in respiratory distress. He has rales. Abdominal: Bowel sounds are normal.   Musculoskeletal: Normal range of motion.    Neurological:   Nonverbal       Data Review:       Recent Days:  Recent Labs     10/28/20  2155   WBC 15.9*   HGB 14.6   HCT 42.9        Recent Labs     10/28/20  2155   *   K 3.6   *   CO2 26   *   BUN 18   CREA 0.98   CA 8.9   ALB 3.1*   TBILI 0.6   ALT 22     Recent Labs     10/29/20  0953   PH 7.430   PCO2 41   PO2 145*   HCO3 27*       24 Hour Results:  Recent Results (from the past 24 hour(s))   SARS-COV-2    Collection Time: 10/29/20  9:15 AM   Result Value Ref Range SARS-CoV-2 Please find results under separate order     BLOOD GAS, ARTERIAL    Collection Time: 10/29/20  9:53 AM   Result Value Ref Range    pH 7.430 7.35 - 7.45      PCO2 41 35 - 45 mmHg    PO2 145 (H) 75 - 100 mmHg    O2  >95 %    BICARBONATE 27 (H) 22 - 26 mmol/L    BASE EXCESS 2.8 (H) 0 - 2 mmol/L    O2 METHOD Non-rebreathing mask      O2 FLOW RATE 5.0 L/min    EPAP/CPAP/PEEP 0      SITE Right Radial      TIFFANIE'S TEST PASS       Imaging:             CXR Results  (Last 48 hours)                 10/28/20 2236   XR CHEST SNGL V Final result     Impression:   IMPRESSION: There is persistent airspace disease within the lung bases without   resolution when compared to prior imaging. Although these findings could   represent recurrent aspiration pneumonia other chronic airspace processes cannot   be fully excluded. 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        Narrative:   This study is a portable radiograph of the chest dated 10/28/2020. HISTORY: Fever aspiration most likely. COMPARISON: 9/10/2020. FINDINGS: There is normal size to the cardiac/pericardial silhouette. The   pulmonary vessels are normal in caliber. There is airspace disease within the mid to lower lung zones associated with an   elevation of the hemidiaphragms. There are similar airspace disease in prior   imaging although there is a greater component of airspace disease within the mid   lung zones and now the airspace process is limited to the lung bases. The costophrenic angles are maintained without pleural effusions. There is   mild-to-moderate ectasia of the thoracic aorta.  Otherwise, the mediastinal   structures are normal. The bony thorax is intact.                      Results from Hospital Encounter encounter on 10/28/20   XR CHEST SNGL V     Narrative This study is a portable radiograph of the chest dated 10/28/2020.     HISTORY: Fever aspiration most likely.     COMPARISON: 9/10/2020.     FINDINGS: There is normal size to the cardiac/pericardial silhouette. The  pulmonary vessels are normal in caliber.     There is airspace disease within the mid to lower lung zones associated with an  elevation of the hemidiaphragms. There are similar airspace disease in prior  imaging although there is a greater component of airspace disease within the mid  lung zones and now the airspace process is limited to the lung bases.     The costophrenic angles are maintained without pleural effusions. There is  mild-to-moderate ectasia of the thoracic aorta. Otherwise, the mediastinal  structures are normal. The bony thorax is intact.        Impression IMPRESSION: There is persistent airspace disease within the lung bases without  resolution when compared to prior imaging. Although these findings could  represent recurrent aspiration pneumonia other chronic airspace processes cannot  be fully excluded. Cryptogenic organized pneumonia can present with chronic  peribronchial and perivascular parenchymal opacification.      No results found for this or any previous visit.        IMPRESSION:   1. Acute hypoxic respiratory failure  2. Aspiration pneumonia   3. HIV positive  4. History of hepatitis C  5. Pt is requiring Drug therapy requiring intensive monitoring for toxicity  6. Pt is unstable, unpredictable needing inpatient monitoring; is acutely ill and at high risk of sudden decline and decompensation with severe consequenses and continued end organ dysfunction and failure  7. Prognosis guarded        RECOMMENDATIONS/PLAN:      1. Patient is in acute respiratory distress he was put on 100% nonrebreather mask now he is on 50% Ventimask ABG showed PCO2 41. Wean oxygen per protocol  2. Agree with Empiric IV antibiotics pending culture results   3. Follow culture results cxr shows bilateral lower lobe infiltrate  4.  Continue antiretroviral therapy for HIV  5. Suspected COVID-19 results pending  6. Supplemental O2 to keep sats > 93%  7. Aspiration precautions  8. Labs to follow electrolytes, renal function and and blood counts  9. Glucose monitoring and SSI  10. Bronchial hygiene with respiratory therapy techniques, bronchodilators  11.  DVT, SUP prophylaxis           Care Plan discussed with: Patient/Family      Teo Kirkpatrick

## 2020-10-31 ENCOUNTER — APPOINTMENT (OUTPATIENT)
Dept: GENERAL RADIOLOGY | Age: 70
DRG: 137 | End: 2020-10-31
Attending: INTERNAL MEDICINE
Payer: MEDICAID

## 2020-10-31 LAB
BASOPHILS # BLD: 0 K/UL (ref 0–0.1)
BASOPHILS NFR BLD: 0 % (ref 0–1)
DIFFERENTIAL METHOD BLD: ABNORMAL
EOSINOPHIL # BLD: 0.2 K/UL (ref 0–0.4)
EOSINOPHIL NFR BLD: 3 % (ref 0–7)
ERYTHROCYTE [DISTWIDTH] IN BLOOD BY AUTOMATED COUNT: 12.8 % (ref 11.5–14.5)
GLUCOSE BLD STRIP.AUTO-MCNC: 119 MG/DL (ref 65–100)
GLUCOSE BLD STRIP.AUTO-MCNC: 89 MG/DL (ref 65–100)
HCT VFR BLD AUTO: 39.8 % (ref 36.6–50.3)
HGB BLD-MCNC: 13.2 G/DL (ref 12.1–17)
IMM GRANULOCYTES # BLD AUTO: 0 K/UL (ref 0–0.04)
IMM GRANULOCYTES NFR BLD AUTO: 0 % (ref 0–0.5)
LYMPHOCYTES # BLD: 1.4 K/UL (ref 0.8–3.5)
LYMPHOCYTES NFR BLD: 19 % (ref 12–49)
MCH RBC QN AUTO: 31.4 PG (ref 26–34)
MCHC RBC AUTO-ENTMCNC: 33.2 G/DL (ref 30–36.5)
MCV RBC AUTO: 94.8 FL (ref 80–99)
MONOCYTES # BLD: 0.5 K/UL (ref 0–1)
MONOCYTES NFR BLD: 7 % (ref 5–13)
NEUTS SEG # BLD: 5.3 K/UL (ref 1.8–8)
NEUTS SEG NFR BLD: 71 % (ref 32–75)
PERFORMED BY, TECHID: ABNORMAL
PERFORMED BY, TECHID: NORMAL
PLATELET # BLD AUTO: 142 K/UL (ref 150–400)
PMV BLD AUTO: 11.6 FL (ref 8.9–12.9)
RBC # BLD AUTO: 4.2 M/UL (ref 4.1–5.7)
SARS-COV-2, COV2NT: NOT DETECTED
WBC # BLD AUTO: 7.4 K/UL (ref 4.1–11.1)

## 2020-10-31 PROCEDURE — 94762 N-INVAS EAR/PLS OXIMTRY CONT: CPT

## 2020-10-31 PROCEDURE — 36415 COLL VENOUS BLD VENIPUNCTURE: CPT

## 2020-10-31 PROCEDURE — 74011250637 HC RX REV CODE- 250/637: Performed by: NURSE PRACTITIONER

## 2020-10-31 PROCEDURE — 71045 X-RAY EXAM CHEST 1 VIEW: CPT

## 2020-10-31 PROCEDURE — 85025 COMPLETE CBC W/AUTO DIFF WBC: CPT

## 2020-10-31 PROCEDURE — 65270000029 HC RM PRIVATE

## 2020-10-31 PROCEDURE — 74011000258 HC RX REV CODE- 258: Performed by: NURSE PRACTITIONER

## 2020-10-31 PROCEDURE — 74011000258 HC RX REV CODE- 258: Performed by: HOSPITALIST

## 2020-10-31 PROCEDURE — 74011250636 HC RX REV CODE- 250/636: Performed by: HOSPITALIST

## 2020-10-31 PROCEDURE — 99232 SBSQ HOSP IP/OBS MODERATE 35: CPT | Performed by: INTERNAL MEDICINE

## 2020-10-31 PROCEDURE — 82962 GLUCOSE BLOOD TEST: CPT

## 2020-10-31 PROCEDURE — 74011250636 HC RX REV CODE- 250/636: Performed by: NURSE PRACTITIONER

## 2020-10-31 PROCEDURE — 74011636637 HC RX REV CODE- 636/637: Performed by: NURSE PRACTITIONER

## 2020-10-31 RX ADMIN — THIAMINE HCL TAB 100 MG 100 MG: 100 TAB at 11:28

## 2020-10-31 RX ADMIN — PIPERACILLIN SODIUM AND TAZOBACTAM SODIUM 3.38 G: 3; .375 INJECTION, POWDER, LYOPHILIZED, FOR SOLUTION INTRAVENOUS at 06:24

## 2020-10-31 RX ADMIN — RALTEGRAVIR 400 MG: 400 TABLET, FILM COATED ORAL at 23:23

## 2020-10-31 RX ADMIN — CYANOCOBALAMIN TAB 1000 MCG 1000 MCG: 1000 TAB at 11:28

## 2020-10-31 RX ADMIN — Medication 10 ML: at 14:00

## 2020-10-31 RX ADMIN — RALTEGRAVIR 400 MG: 400 TABLET, FILM COATED ORAL at 11:28

## 2020-10-31 RX ADMIN — PANTOPRAZOLE SODIUM 40 MG: 40 GRANULE, DELAYED RELEASE ORAL at 06:26

## 2020-10-31 RX ADMIN — DOCUSATE SODIUM 100 MG: 50 LIQUID ORAL at 11:27

## 2020-10-31 RX ADMIN — EMTRICITABINE AND TENOFOVIR DISOPROXIL FUMARATE 1 TABLET: 200; 300 TABLET, FILM COATED ORAL at 09:00

## 2020-10-31 RX ADMIN — ZIPRASIDONE HCL 40 MG: 40 CAPSULE ORAL at 11:27

## 2020-10-31 RX ADMIN — PIPERACILLIN SODIUM AND TAZOBACTAM SODIUM 3.38 G: 3; .375 INJECTION, POWDER, LYOPHILIZED, FOR SOLUTION INTRAVENOUS at 15:13

## 2020-10-31 RX ADMIN — Medication 10 ML: at 23:24

## 2020-10-31 RX ADMIN — PREDNISONE 20 MG: 20 TABLET ORAL at 11:28

## 2020-10-31 RX ADMIN — PIPERACILLIN SODIUM AND TAZOBACTAM SODIUM 3.38 G: 3; .375 INJECTION, POWDER, LYOPHILIZED, FOR SOLUTION INTRAVENOUS at 23:26

## 2020-10-31 NOTE — PROGRESS NOTES
Infectious Disease Progress Note               Subjective:   Remains non-verbal. No acute events since last seen. Denies new complaints   Objective:   Physical Exam:     Visit Vitals  BP (!) 127/90 (BP 1 Location: Left arm, BP Patient Position: At rest)   Pulse 75   Temp 97.2 °F (36.2 °C)   Resp 18   Ht 6' 0.01\" (1.829 m)   Wt 79.4 kg (175 lb)   SpO2 96%   BMI 23.73 kg/m²    O2 Flow Rate (L/min): 6 l/min O2 Device: Room air    Temp (24hrs), Av.5 °F (36.4 °C), Min:96.9 °F (36.1 °C), Max:98.6 °F (37 °C)    10/31 0701 - 10/31 1900  In: -   Out: 250 [Urine:250]   10/29 1901 - 10/31 0700  In: 120 [P.O.:120]  Out: 300 [Urine:300]    General: NAD, alert, non-conversant   HEENT: RENAY, Moist mucosa   Lungs: CTA b/l, decreased at the bases no wheeze/rhonchi   Heart: S1S2+, RRR, no murmur  Abdo: Soft, NT, ND, +BS, + peg tube   :  + indwelling izaguirre   Exts: No edema, + pulses b/l   Skin: No wounds, No rashes or lesions    Data Review:       Recent Days:  Recent Labs     10/31/20  1135 10/30/20  0740 10/28/20  2155   WBC 7.4 9.7 15.9*   HGB 13.2 12.7 14.6   HCT 39.8 38.2 42.9   * 162 157     Recent Labs     10/30/20  0740 10/28/20  2155   BUN 14 18   CREA 1.09 0.98       Lab Results   Component Value Date/Time    C-Reactive protein 9.50 (H) 10/30/2020 07:40 AM       Microbiology   - Blood Cx 10/28: No growth     Diagnostics   CXR Results  (Last 48 hours)               10/31/20 1032  XR CHEST PORT Final result    Impression:  Impression:       Mild decreased airspace disease at the base of the right lung. No significant   change of the interstitial and airspace disease in the mid and basilar left   lung. Narrative:  Examination: Frontal radiograph of the chest, 10/31/2020       Clinical information: pneumonia       Comparison: 10/28/2020       Findings: The cardiomediastinal silhouette is normal in size and contours. There is no   pulmonary vascular congestion.  The lungs are hypoinflated with multifocal   airspace and interstitial opacities in the mid and basilar left lung. There are   mild decreased airspace opacities at the base of the right lung. No pleural   effusion or pneumothorax. Assessment/Plan     1. Acute hypoxia due aspiration PNA, b/l airspace disease on CXR       Maintaining O2 sats on RA, no cough       Afebrile, WBC normalized on todays labs       On day # 3/14 of Zosyn. CBC w AM labs      2. HIV infection: Continue maintenance medications, isentress, and Truvada. HIV RNA VL and T cell count are pending      3. Dysphagia: S/p peg tube placement      4.  H/o chronic hep C, HCV RNA VL is pending      5.  AMS/decreased responsiveness: Baseline mentation unknown      Brandin Dodson MD    10/31/2020

## 2020-10-31 NOTE — PROGRESS NOTES
Hospitalist Progress Note    Subjective:   Daily Progress Note: 10/31/2020 12:33 PM    Hospital Course:  81 y. o. male nonverbal noncommunicative with chronically bedridden status, HIV, recent COVID-23 positive presents to the emergency room from [facility] by emergency crew as they found him vomiting coffee-ground emesis.  Suspected of aspiration pneumonia.  This patient was recently hospitalized for more than a month due to Klebsiella pneumonia pneumonia that was treated with improvement.  He presented with acute respiratory failure last admission.  Now currently he is on nonrebreather mask saturating 100%, he just awake with eyes open but does not understand anything. Chest x-ray suggestive of no improvement from pneumonia findings from previous x-ray. Subjective: Pt seen in room, staff nurse to start TF , instructed to do bolus feeds if cannot obtain feeding pump.  Pt is non verbal , appears comfortable, no dyspnea , no coughing    Current Facility-Administered Medications   Medication Dose Route Frequency    piperacillin-tazobactam (ZOSYN) 3.375 g in 0.9% sodium chloride (MBP/ADV) 100 mL MBP  3.375 g IntraVENous Q8H    albuterol (PROVENTIL VENTOLIN) nebulizer solution 2.5 mg  2.5 mg Nebulization Q6H PRN    cyanocobalamin tablet 1,000 mcg  1,000 mcg Oral DAILY    docusate (COLACE) 50 mg/5 mL oral liquid 100 mg  100 mg Per G Tube BID    emtricitabine-tenofovir (TDF) (TRUVADA) 200-300 mg per tablet 1 Tab  1 Tab Oral DAILY    fentaNYL (DURAGESIC) 25 mcg/hr patch 1 Patch  1 Patch TransDERmal Q72H    pantoprazole (PROTONIX) granules for oral suspension 40 mg  40 mg Per G Tube ACB    raltegravir (ISENTRESS) tablet 400 mg  400 mg Oral BID    senna (SENOKOT) tablet 8.6 mg  1 Tab Per G Tube QHS    thiamine mononitrate (B-1) tablet 100 mg  100 mg Per G Tube DAILY    ziprasidone (GEODON) capsule 40 mg  40 mg Oral DAILY    sodium chloride (NS) flush 5-40 mL  5-40 mL IntraVENous Q8H    sodium chloride (NS) flush 5-40 mL  5-40 mL IntraVENous PRN    acetaminophen (TYLENOL) tablet 650 mg  650 mg Per J Tube Q4H PRN    predniSONE (DELTASONE) tablet 20 mg  20 mg Oral DAILY    acetaminophen (TYLENOL) suppository 650 mg  650 mg Rectal Q4H PRN    ondansetron (ZOFRAN) injection 4 mg  4 mg IntraVENous Q6H PRN        Review of Systems:    ROS   Unable to obtain due to non verbal  Objective:     Visit Vitals  /68 (BP 1 Location: Left arm, BP Patient Position: At rest)   Pulse 74   Temp 96.9 °F (36.1 °C)   Resp (P) 18   Ht 6' 0.01\" (1.829 m)   Wt 79.4 kg (175 lb)   SpO2 98%   BMI 23.73 kg/m²    O2 Flow Rate (L/min): 6 l/min O2 Device: (P) Room air    Temp (24hrs), Av.6 °F (36.4 °C), Min:96.9 °F (36.1 °C), Max:98.6 °F (37 °C)      No intake/output data recorded. 10/29 1901 - 10/31 0700  In: 120 [P.O.:120]  Out: 300 [Urine:300]    PHYSICAL EXAM:    Physical Exam     Constitutional: He appears well-developed. No distress. Cardiovascular: Normal rate, regular rhythm and intact distal pulses.   Murmur heard. Pulmonary/Chest:. He has scattered upper airway coarse sounds, diminished in bases. Abdominal: Soft. Bowel sounds are normal.   PEG tube intact , clamped  Genitourinary:    Penis and rectum normal.      Genitourinary Comments:condom  catheter patent, clear yellow urine    Musculoskeletal:         General: Deformity present.      Comments: Rigid,  Semi contracted with arms   Neurological: He is alert.   Non verbal   Skin: Skin is warm and dry.   Data Review    No results found for this or any previous visit (from the past 24 hour(s)). XR CHEST PORT   Final Result   Impression:      Mild decreased airspace disease at the base of the right lung. No significant   change of the interstitial and airspace disease in the mid and basilar left   lung.       XR CHEST SNGL V   Final Result   IMPRESSION: There is persistent airspace disease within the lung bases without   resolution when compared to prior imaging. Although these findings could   represent recurrent aspiration pneumonia other chronic airspace processes cannot   be fully excluded. 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 Active Problems:    Pneumonia (10/29/2020)        Assessment/Plan:     1. Aspiration pneumonia- ID following, on IV zosyn,  Pulmonary following, pt on room air, monitor pulse oximetry, repeat CXR tomorrow. covid 19 test is negative, transfer pt to medical floor.     2. Advanced dementia/dysphagia- start bolus feedings if cannot obtain feed pump,  Nutrition following, nursing order placed in chart for instructions.      3. History of schizophrenia- on geodon.     4. History of HIV- continue medications.     5. GI prophylaxis- PPI.     6. Hematemesis/occult blood positive- HGB stable,  No need for blood transfusion,  GI consulted,   Follow cbc results. 7. Discharge - back to [facility], likely on Monday.             DVT Prophylaxis: none due to chronic immobility at home.    Code Status:  Partial Code  POA:    Care Plan discussed with:   ______staff nurse_________________________________________________________    Guerita Webb NP

## 2020-10-31 NOTE — PROGRESS NOTES
Physician Progress Note      Cecil Pruitt  CSN #:                  [id number]  :                       1950  ADMIT DATE:       10/28/2020 8:24 PM  [address] [place] DATE:  RESPONDING  PROVIDER #:        Pat Moser MD [facility] MD          QUERY TEXT:    Pt admitted with Aspiration PNA  Pt noted to have HIV disease. If possible, please clarify in progress notes and discharge summary the patient?s HIV status as: The medical record reflects the following:  Risk Factors: HIV, Dysphagia  Clinical Indicators: SOB, cxr: represent recurrent aspiration pneumonia other chronic airspace processes cannot  be fully excluded. Cryptogenic organized pneumonia can present with chronic peribronchial and perivascular parenchymal opacification. Treatment: Zosyn IV      Thank you. Conner Romero RN CDI, CCS  Ext [phone]  Options provided:  -- HIV disease/AIDS with current HIV related illness Aspiration PNA  -- Other - I will add my own diagnosis  -- Disagree - Not applicable / Not valid  -- Disagree - Clinically unable to determine / Unknown  -- Refer to Clinical Documentation Reviewer    PROVIDER RESPONSE TEXT:    Do not know the status of HIV, on treatment. ID ordered for labs to check status. Need to follow up.     Query created by: Hiren French on 10/30/2020 10:00 AM      Electronically signed by:  Monalisa Velázquez MD 10/31/2020 7:05 AM

## 2020-10-31 NOTE — PROGRESS NOTES
Progress Note    Patient: Linnea Plascencia MRN: [id number]  SSN: [id number]    YOB: 1950  Age: 81 y.o.   Sex: male      Admit Date: 10/28/2020    LOS: 1 day     Subjective:   Patient examined, no nausea or vomiting, no  hematemesis, on tube feeding, tolerated without event today  Past Medical History:   Diagnosis Date    Bed confinement status     Dementia ([facility] [place] [facility]     Hepatitis C     HIV (human immunodeficiency virus infection) ([facility] [place] [facility]     Schizophrenia ([facility] [place] [facility]         Current Facility-Administered Medications:     piperacillin-tazobactam (ZOSYN) 3.375 g in 0.9% sodium chloride (MBP/ADV) 100 mL MBP, 3.375 g, IntraVENous, Q8H, Santos Maurer MD, Last Rate: 25 mL/hr at 10/30/20 1503, 3.375 g at 10/30/20 1503    albuterol (PROVENTIL VENTOLIN) nebulizer solution 2.5 mg, 2.5 mg, Nebulization, Q6H PRN, Kenton Bryan A, NP    cyanocobalamin tablet 1,000 mcg, 1,000 mcg, Oral, DAILY, Zarefpadilla, Bryan A, NP, 1,000 mcg at 10/30/20 1000    docusate (COLACE) 50 mg/5 mL oral liquid 100 mg, 100 mg, Per G Tube, BID, Zarefoss, Bryan A, NP, 100 mg at 10/30/20 1000    emtricitabine-tenofovir (TDF) (TRUVADA) 200-300 mg per tablet 1 Tab, 1 Tab, Oral, DAILY, Bryan Fung A, NP, 1 Tab at 10/29/20 1006    fentaNYL (DURAGESIC) 25 mcg/hr patch 1 Patch, 1 Patch, TransDERmal, Q72H, Louisarefpadilla, Bryan A, NP, 1 Patch at 10/30/20 4843    pantoprazole (PROTONIX) granules for oral suspension 40 mg, 40 mg, Per G Tube, ACB, Zarefoss, Bryan A, NP, 40 mg at 10/30/20 1000    raltegravir (ISENTRESS) tablet 400 mg, 400 mg, Oral, BID, Zarefpadilla, Bryan A, NP, 400 mg at 10/30/20 1000    senna (SENOKOT) tablet 8.6 mg, 1 Tab, Per G Tube, QHS, Bryan Fung, NP, 8.6 mg at 10/30/20 0119    thiamine mononitrate (B-1) tablet 100 mg, 100 mg, Per G Tube, DAILY, Bryan Fung, NP, 100 mg at 10/30/20 1001    ziprasidone (GEODON) capsule 40 mg, 40 mg, Oral, DAILY, Zaleigh ann Bryan MELISSA, NP, 40 mg at 10/30/20 1000    sodium chloride (NS) flush 5-40 mL, 5-40 mL, IntraVENous, Q8H, Holly Baca MD, 10 mL at 10/30/20 0601    sodium chloride (NS) flush 5-40 mL, 5-40 mL, IntraVENous, PRN, Holly Baca MD    acetaminophen (TYLENOL) tablet 650 mg, 650 mg, Per J Tube, Q4H PRN, Bryan Fung NP    predniSONE (DELTASONE) tablet 20 mg, 20 mg, Oral, DAILY, Bryan Fung NP, 20 mg at 10/30/20 1000    acetaminophen (TYLENOL) suppository 650 mg, 650 mg, Rectal, Q4H PRN, Bryan Fung NP    [facility]ans[facility]ron [facility] injection 4 mg, 4 mg, IntraVENous, Q6H PRN, Bryan Fung NP    Objective:     Vitals:    10/30/20 0909 10/30/20 1500 10/30/20 2003 10/30/20 2038   BP:  129/76 131/76    Pulse:  76 75    Resp:  16 17    Temp:  98.6 °F (37 °C) 97.6 °F (36.4 °C)    SpO2:  97% 96% 96%   Weight:       Height: 6' 0.01\" (1.829 m)           Intake and Output:  Current Shift: No intake/output data recorded. Last three shifts: 10/29 0701 - 10/30 1900  In: 120 [P.O.:120]  Out: 9567 [Urine:1375]    Physical Exam:   Physical Exam   Constitutional: He appears distressed. HENT:   Head: Atraumatic. Eyes: EOM are normal.   Neck: No thyromegaly present. Cardiovascular: Normal heart sounds. Pulmonary/Chest: Effort normal.   Abdominal: He exhibits distension. There is abdominal tenderness. There is no rebound. Skin: Rash noted. No erythema.         Lab/Data Review:  Recent Results (from the past 24 hour(s))   METABOLIC PANEL, COMPREHENSIVE    Collection Time: 10/30/20  7:40 AM   Result Value Ref Range    Sodium 149 (H) 136 - 145 mmol/L    Potassium 3.6 3.5 - 5.1 mmol/L    Chloride 116 (H) 97 - 108 mmol/L    CO2 26 21 - 32 mmol/L    Anion gap 7 5 - 15 mmol/L    Glucose 89 65 - 100 mg/dL    BUN 14 6 - 20 mg/dL    Creatinine 1.09 0.70 - 1.30 mg/dL    BUN/Creatinine ratio 13 12 - 20      GFR est AA >60 >60 ml/min/1.73m2    GFR est non-AA >60 >60 ml/min/1.73m2    Calcium 9.0 8.5 - 10.1 mg/dL    Bilirubin, total 1.2 (H) 0.2 - 1.0 mg/dL    AST (SGOT) 15 15 - 37 U/L ALT (SGPT) 17 12 - 78 U/L    Alk. phosphatase 59 45 - 117 U/L    Protein, total 6.9 6.4 - 8.2 g/dL    Albumin 2.8 (L) 3.5 - 5.0 g/dL    Globulin 4.1 (H) 2.0 - 4.0 g/dL    A-G Ratio 0.7 (L) 1.1 - 2.2     CBC WITH AUTOMATED DIFF    Collection Time: 10/30/20  7:40 AM   Result Value Ref Range    WBC 9.7 4.1 - 11.1 K/uL    RBC 4.01 (L) 4.10 - 5.70 M/uL    HGB 12.7 12.1 - 17.0 g/dL    HCT 38.2 36.6 - 50.3 %    MCV 95.3 80.0 - 99.0 FL    MCH 31.7 26.0 - 34.0 PG    MCHC 33.2 30.0 - 36.5 g/dL    RDW 13.1 11.5 - 14.5 %    PLATELET 350 657 - 431 K/uL    MPV 11.0 8.9 - 12.9 FL    NEUTROPHILS 73 32 - 75 %    LYMPHOCYTES 18 12 - 49 %    MONOCYTES 7 5 - 13 %    EOSINOPHILS 2 0 - 7 %    BASOPHILS 0 0 - 1 %    IMMATURE GRANULOCYTES 0 0.0 - 0.5 %    ABS. NEUTROPHILS 7.1 1.8 - 8.0 K/UL    ABS. LYMPHOCYTES 1.8 0.8 - 3.5 K/UL    ABS. MONOCYTES 0.7 0.0 - 1.0 K/UL    ABS. EOSINOPHILS 0.2 0.0 - 0.4 K/UL    ABS. BASOPHILS 0.0 0.0 - 0.1 K/UL    ABS. IMM. GRANS. 0.0 0.00 - 0.04 K/UL    DF AUTOMATED     C REACTIVE PROTEIN, QT    Collection Time: 10/30/20  7:40 AM   Result Value Ref Range    C-Reactive protein 9.50 (H) 0.00 - 0.60 mg/dL   PROCALCITONIN    Collection Time: 10/30/20  7:40 AM   Result Value Ref Range    Procalcitonin 0.29 (H) 0 ng/mL        XR CHEST SNGL V   Final Result   IMPRESSION: There is persistent airspace disease within the lung bases without   resolution when compared to prior imaging. Although these findings could   represent recurrent aspiration pneumonia other chronic airspace processes cannot   be fully excluded. 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 Advanced dementia/dysphagia- PEG feedings,    Hematemesis/occult blood positive-   Plan:    continue on tube feeding  Continue on. PPI  Monitory HB  Iv zosyn     No endoscopy needed this time      Plan:       Signed By: Huang Brantley MD     October 30, 2020        Thank you for allowing me to participate in this patients care  Cc Referring Physician   Kailey Wiseman MD

## 2020-10-31 NOTE — PROGRESS NOTES
Pulmonary Progress Note               Daily Progress Note: 10/31/2020      Subjective: The patient is seen for follow  up. Excerpts from admission 10/28/2020 or consult notes as follows:   81year old male from [address] [place] [address] came in because of fever and vomiting he is nonverbal bedridden HIV-positive unable to get any history out of the patient he was admitted to the hospital previously because of pneumonia which was Klebsiella he was put on 100% nonrebreather mask in the emergency room which has been changed to Ventimask open eyes but does not follow any command so admitted and pulmonary consult was called to further evaluation.     Currently on Ventimask 6L. Blood gases 10/29 on non-rebreather: pH 7.43, pCO2 41, pO2 145, bicarb 27, O2 sat 100%, base excess 2.8. COVID-19 Negative. Fecal occult blood was positive 10/28. Hb 10/28 14.6. Gastroenterology is monitoring, no need for endoscopy at this time.     Problem List:  Problem List as of 10/31/2020 Date Reviewed: 10/29/2020          Codes Class Noted - Resolved    Pneumonia ICD-10-CM: J18.9  ICD-9-CM: 938  10/29/2020 - Present        SOB (shortness of breath) ICD-10-CM: R06.02  ICD-9-CM: 786.05  9/12/2020 - Present              Medications reviewed  Current Facility-Administered Medications   Medication Dose Route Frequency    piperacillin-tazobactam (ZOSYN) 3.375 g in 0.9% sodium chloride (MBP/ADV) 100 mL MBP  3.375 g IntraVENous Q8H    albuterol (PROVENTIL VENTOLIN) nebulizer solution 2.5 mg  2.5 mg Nebulization Q6H PRN    cyanocobalamin tablet 1,000 mcg  1,000 mcg Oral DAILY    docusate (COLACE) 50 mg/5 mL oral liquid 100 mg  100 mg Per G Tube BID    emtricitabine-tenofovir (TDF) (TRUVADA) 200-300 mg per tablet 1 Tab  1 Tab Oral DAILY    fentaNYL (DURAGESIC) 25 mcg/hr patch 1 Patch  1 Patch TransDERmal Q72H    pantoprazole (PROTONIX) granules for oral suspension 40 mg  40 mg Per G Tube ACB    raltegravir (ISENTRESS) tablet 400 mg 400 mg Oral BID    senna (SENOKOT) tablet 8.6 mg  1 Tab Per G Tube QHS    thiamine mononitrate (B-1) tablet 100 mg  100 mg Per G Tube DAILY    ziprasidone (GEODON) capsule 40 mg  40 mg Oral DAILY    sodium chloride (NS) flush 5-40 mL  5-40 mL IntraVENous Q8H    sodium chloride (NS) flush 5-40 mL  5-40 mL IntraVENous PRN    acetaminophen (TYLENOL) tablet 650 mg  650 mg Per J Tube Q4H PRN    predniSONE (DELTASONE) tablet 20 mg  20 mg Oral DAILY    acetaminophen (TYLENOL) suppository 650 mg  650 mg Rectal Q4H PRN    ondansetron (ZOFRAN) injection 4 mg  4 mg IntraVENous Q6H PRN       Review of Systems:   Unable to obtain    Objective:   Physical Exam:     Visit Vitals  /77   Pulse 72   Temp 97.4 °F (36.3 °C)   Resp 18   Ht 6' 0.01\" (1.829 m)   Wt 175 lb (79.4 kg)   SpO2 96%   BMI 23.73 kg/m²    O2 Flow Rate (L/min): 6 l/min O2 Device: Ventimask    Temp (24hrs), Av.9 °F (36.6 °C), Min:97.4 °F (36.3 °C), Max:98.6 °F (37 °C)    No intake/output data recorded. 10/29 190 - 10/31 0700  In: 120 [P.O.:120]  Out: 300 [Urine:300]    Constitutional: He appears distressed. Nonverbal   HENT:   Head: Normocephalic and atraumatic. Neck: Normal range of motion. Cardiovascular: Normal rate and normal heart sounds. Pulmonary/Chest: He is in respiratory distress. He has rales. Abdominal: Bowel sounds are normal.   Musculoskeletal: Normal range of motion.    Neurological:   Nonverbal       Data Review:       Recent Days:  Recent Labs     10/30/20  0740 10/28/20  2155   WBC 9.7 15.9*   HGB 12.7 14.6   HCT 38.2 42.9    157     Recent Labs     10/30/20  0740 10/28/20  2155   * 148*   K 3.6 3.6   * 113*   CO2 26 26   GLU 89 102*   BUN 14 18   CREA 1.09 0.98   CA 9.0 8.9   ALB 2.8* 3.1*   TBILI 1.2* 0.6   ALT 17 22     Recent Labs     10/29/20  0953   PH 7.430   PCO2 41   PO2 145*   HCO3 27*       24 Hour Results:  Recent Results (from the past 24 hour(s))   METABOLIC PANEL, COMPREHENSIVE Collection Time: 10/30/20  7:40 AM   Result Value Ref Range    Sodium 149 (H) 136 - 145 mmol/L    Potassium 3.6 3.5 - 5.1 mmol/L    Chloride 116 (H) 97 - 108 mmol/L    CO2 26 21 - 32 mmol/L    Anion gap 7 5 - 15 mmol/L    Glucose 89 65 - 100 mg/dL    BUN 14 6 - 20 mg/dL    Creatinine 1.09 0.70 - 1.30 mg/dL    BUN/Creatinine ratio 13 12 - 20      GFR est AA >60 >60 ml/min/1.73m2    GFR est non-AA >60 >60 ml/min/1.73m2    Calcium 9.0 8.5 - 10.1 mg/dL    Bilirubin, total 1.2 (H) 0.2 - 1.0 mg/dL    AST (SGOT) 15 15 - 37 U/L    ALT (SGPT) 17 12 - 78 U/L    Alk. phosphatase 59 45 - 117 U/L    Protein, total 6.9 6.4 - 8.2 g/dL    Albumin 2.8 (L) 3.5 - 5.0 g/dL    Globulin 4.1 (H) 2.0 - 4.0 g/dL    A-G Ratio 0.7 (L) 1.1 - 2.2     CBC WITH AUTOMATED DIFF    Collection Time: 10/30/20  7:40 AM   Result Value Ref Range    WBC 9.7 4.1 - 11.1 K/uL    RBC 4.01 (L) 4.10 - 5.70 M/uL    HGB 12.7 12.1 - 17.0 g/dL    HCT 38.2 36.6 - 50.3 %    MCV 95.3 80.0 - 99.0 FL    MCH 31.7 26.0 - 34.0 PG    MCHC 33.2 30.0 - 36.5 g/dL    RDW 13.1 11.5 - 14.5 %    PLATELET 226 623 - 998 K/uL    MPV 11.0 8.9 - 12.9 FL    NEUTROPHILS 73 32 - 75 %    LYMPHOCYTES 18 12 - 49 %    MONOCYTES 7 5 - 13 %    EOSINOPHILS 2 0 - 7 %    BASOPHILS 0 0 - 1 %    IMMATURE GRANULOCYTES 0 0.0 - 0.5 %    ABS. NEUTROPHILS 7.1 1.8 - 8.0 K/UL    ABS. LYMPHOCYTES 1.8 0.8 - 3.5 K/UL    ABS. MONOCYTES 0.7 0.0 - 1.0 K/UL    ABS. EOSINOPHILS 0.2 0.0 - 0.4 K/UL    ABS. BASOPHILS 0.0 0.0 - 0.1 K/UL    ABS. IMM.  GRANS. 0.0 0.00 - 0.04 K/UL    DF AUTOMATED     C REACTIVE PROTEIN, QT    Collection Time: 10/30/20  7:40 AM   Result Value Ref Range    C-Reactive protein 9.50 (H) 0.00 - 0.60 mg/dL   PROCALCITONIN    Collection Time: 10/30/20  7:40 AM   Result Value Ref Range    Procalcitonin 0.29 (H) 0 ng/mL     Imaging:             CXR Results  (Last 48 hours)                 10/28/20 2236   XR CHEST SNGL V Final result     Impression:   IMPRESSION: There is persistent airspace disease within the lung bases without   resolution when compared to prior imaging. Although these findings could   represent recurrent aspiration pneumonia other chronic airspace processes cannot   be fully excluded. 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        Narrative:   This study is a portable radiograph of the chest dated 10/28/2020. HISTORY: Fever aspiration most likely. COMPARISON: 9/10/2020. FINDINGS: There is normal size to the cardiac/pericardial silhouette. The   pulmonary vessels are normal in caliber. There is airspace disease within the mid to lower lung zones associated with an   elevation of the hemidiaphragms. There are similar airspace disease in prior   imaging although there is a greater component of airspace disease within the mid   lung zones and now the airspace process is limited to the lung bases. The costophrenic angles are maintained without pleural effusions. There is   mild-to-moderate ectasia of the thoracic aorta. Otherwise, the mediastinal   structures are normal. The bony thorax is intact.                      Results from Hospital Encounter encounter on 10/28/20   XR CHEST SNGL V     Narrative This study is a portable radiograph of the chest dated 10/28/2020.     HISTORY: Fever aspiration most likely.     COMPARISON: 9/10/2020.     FINDINGS: There is normal size to the cardiac/pericardial silhouette. The  pulmonary vessels are normal in caliber.     There is airspace disease within the mid to lower lung zones associated with an  elevation of the hemidiaphragms. There are similar airspace disease in prior  imaging although there is a greater component of airspace disease within the mid  lung zones and now the airspace process is limited to the lung bases.     The costophrenic angles are maintained without pleural effusions. There is  mild-to-moderate ectasia of the thoracic aorta. Otherwise, the mediastinal  structures are normal. The bony thorax is intact.        Impression IMPRESSION: There is persistent airspace disease within the lung bases without  resolution when compared to prior imaging. Although these findings could  represent recurrent aspiration pneumonia other chronic airspace processes cannot  be fully excluded. Cryptogenic organized pneumonia can present with chronic  peribronchial and perivascular parenchymal opacification.      No results found for this or any previous visit.        IMPRESSION:   1. Acute hypoxic respiratory failure   2. Aspiration pneumonia   3. HIV positive  4. History of hepatitis C  5. Pt is requiring Drug therapy requiring intensive monitoring for toxicity  6. Pt is unstable, unpredictable needing inpatient monitoring; is acutely ill and at high risk of sudden decline and decompensation with severe consequenses and continued end organ dysfunction and failure  7. Prognosis guarded        RECOMMENDATIONS/PLAN:      1. Patient is Ventimask  2. Agree with Empiric IV antibiotics pending culture results   3. Follow culture results cxr shows bilateral lower lobe infiltrate  4.  Continue antiretroviral therapy for HIV  5. COVID-19 negative                   Alek Joaquin

## 2020-10-31 NOTE — PROGRESS NOTES
Pulmonary Progress Note               Daily Progress Note: 10/31/2020      Subjective: The patient is seen for follow  up. Excerpts from admission 10/28/2020 or consult notes as follows:   81year old male from [address] [place] [address] came in because of fever and vomiting he is nonverbal bedridden HIV-positive unable to get any history out of the patient he was admitted to the hospital previously because of pneumonia which was Klebsiella he was put on 100% nonrebreather mask in the emergency room which has been changed to Ventimask open eyes but does not follow any command so admitted and pulmonary consult was called to further evaluation.     Currently on Ventimask 6L. Blood gases 10/29 on non-rebreather: pH 7.43, pCO2 41, pO2 145, bicarb 27, O2 sat 100%, base excess 2.8. COVID-19 Negative. Fecal occult blood was positive 10/28. Hb 10/28 14.6. Gastroenterology is monitoring, no need for endoscopy at this time.     Problem List:  Problem List as of 10/31/2020 Date Reviewed: 10/29/2020          Codes Class Noted - Resolved    Pneumonia ICD-10-CM: J18.9  ICD-9-CM: 150  10/29/2020 - Present        SOB (shortness of breath) ICD-10-CM: R06.02  ICD-9-CM: 786.05  9/12/2020 - Present              Medications reviewed  Current Facility-Administered Medications   Medication Dose Route Frequency    piperacillin-tazobactam (ZOSYN) 3.375 g in 0.9% sodium chloride (MBP/ADV) 100 mL MBP  3.375 g IntraVENous Q8H    albuterol (PROVENTIL VENTOLIN) nebulizer solution 2.5 mg  2.5 mg Nebulization Q6H PRN    cyanocobalamin tablet 1,000 mcg  1,000 mcg Oral DAILY    docusate (COLACE) 50 mg/5 mL oral liquid 100 mg  100 mg Per G Tube BID    emtricitabine-tenofovir (TDF) (TRUVADA) 200-300 mg per tablet 1 Tab  1 Tab Oral DAILY    fentaNYL (DURAGESIC) 25 mcg/hr patch 1 Patch  1 Patch TransDERmal Q72H    pantoprazole (PROTONIX) granules for oral suspension 40 mg  40 mg Per G Tube ACB    raltegravir (ISENTRESS) tablet 400 mg 400 mg Oral BID    senna (SENOKOT) tablet 8.6 mg  1 Tab Per G Tube QHS    thiamine mononitrate (B-1) tablet 100 mg  100 mg Per G Tube DAILY    ziprasidone (GEODON) capsule 40 mg  40 mg Oral DAILY    sodium chloride (NS) flush 5-40 mL  5-40 mL IntraVENous Q8H    sodium chloride (NS) flush 5-40 mL  5-40 mL IntraVENous PRN    acetaminophen (TYLENOL) tablet 650 mg  650 mg Per J Tube Q4H PRN    predniSONE (DELTASONE) tablet 20 mg  20 mg Oral DAILY    acetaminophen (TYLENOL) suppository 650 mg  650 mg Rectal Q4H PRN    ondansetron (ZOFRAN) injection 4 mg  4 mg IntraVENous Q6H PRN       Review of Systems:   Unable to obtain    Objective:   Physical Exam:     Visit Vitals  /68 (BP 1 Location: Left arm, BP Patient Position: At rest)   Pulse 74   Temp 96.9 °F (36.1 °C)   Resp 18   Ht 6' 0.01\" (1.829 m)   Wt 79.4 kg (175 lb)   SpO2 94%   BMI 23.73 kg/m²    O2 Flow Rate (L/min): 6 l/min O2 Device: Ventimask    Temp (24hrs), Av.6 °F (36.4 °C), Min:96.9 °F (36.1 °C), Max:98.6 °F (37 °C)    No intake/output data recorded. 10/29 1901 - 10/31 0700  In: 120 [P.O.:120]  Out: 300 [Urine:300]    Constitutional: He appears distressed. Nonverbal   HENT:   Head: Normocephalic and atraumatic. Neck: Normal range of motion. Cardiovascular: Normal rate and normal heart sounds. Pulmonary/Chest: He is in respiratory distress. He has rales. Abdominal: Bowel sounds are normal.   Musculoskeletal: Normal range of motion.    Neurological:   Nonverbal       Data Review:       Recent Days:  Recent Labs     10/30/20  0740 10/28/20  2155   WBC 9.7 15.9*   HGB 12.7 14.6   HCT 38.2 42.9    157     Recent Labs     10/30/20  0740 10/28/20  2155   * 148*   K 3.6 3.6   * 113*   CO2 26 26   GLU 89 102*   BUN 14 18   CREA 1.09 0.98   CA 9.0 8.9   ALB 2.8* 3.1*   TBILI 1.2* 0.6   ALT 17 22     Recent Labs     10/29/20  0953   PH 7.430   PCO2 41   PO2 145*   HCO3 27*       24 Hour Results:  No results found for this or any previous visit (from the past 24 hour(s)). Imaging:             CXR Results  (Last 48 hours)                 10/28/20 2236   XR CHEST SNGL V Final result     Impression:   IMPRESSION: There is persistent airspace disease within the lung bases without   resolution when compared to prior imaging. Although these findings could   represent recurrent aspiration pneumonia other chronic airspace processes cannot   be fully excluded. 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        Narrative:   This study is a portable radiograph of the chest dated 10/28/2020. HISTORY: Fever aspiration most likely. COMPARISON: 9/10/2020. FINDINGS: There is normal size to the cardiac/pericardial silhouette. The   pulmonary vessels are normal in caliber. There is airspace disease within the mid to lower lung zones associated with an   elevation of the hemidiaphragms. There are similar airspace disease in prior   imaging although there is a greater component of airspace disease within the mid   lung zones and now the airspace process is limited to the lung bases. The costophrenic angles are maintained without pleural effusions. There is   mild-to-moderate ectasia of the thoracic aorta. Otherwise, the mediastinal   structures are normal. The bony thorax is intact.                      Results from Hospital Encounter encounter on 10/28/20   XR CHEST SNGL V     Narrative This study is a portable radiograph of the chest dated 10/28/2020.     HISTORY: Fever aspiration most likely.     COMPARISON: 9/10/2020.     FINDINGS: There is normal size to the cardiac/pericardial silhouette. The  pulmonary vessels are normal in caliber.     There is airspace disease within the mid to lower lung zones associated with an  elevation of the hemidiaphragms.  There are similar airspace disease in prior  imaging although there is a greater component of airspace disease within the mid  lung zones and now the airspace process is limited to the lung bases.     The costophrenic angles are maintained without pleural effusions. There is  mild-to-moderate ectasia of the thoracic aorta. Otherwise, the mediastinal  structures are normal. The bony thorax is intact.        Impression IMPRESSION: There is persistent airspace disease within the lung bases without  resolution when compared to prior imaging. Although these findings could  represent recurrent aspiration pneumonia other chronic airspace processes cannot  be fully excluded. Cryptogenic organized pneumonia can present with chronic  peribronchial and perivascular parenchymal opacification.      No results found for this or any previous visit.        IMPRESSION:   1. Acute hypoxic respiratory failure   2. Aspiration pneumonia   3. HIV positive  4. History of hepatitis C  5. Pt is requiring Drug therapy requiring intensive monitoring for toxicity  6. Prognosis guarded        RECOMMENDATIONS/PLAN:      1. Patient is on room air  2. Agree with Empiric IV antibiotics pending culture results   3. Follow culture results cxr shows bilateral lower lobe infiltrate  4.  Continue antiretroviral therapy for HIV  5. COVID-19 negative  Will repeat chest x-ray                 Skip Maya MD

## 2020-11-01 LAB
ANION GAP SERPL CALC-SCNC: 5 MMOL/L (ref 5–15)
BASOPHILS # BLD AUTO: 0.1 X10E3/UL
BASOPHILS # BLD: 0 K/UL (ref 0–0.1)
BASOPHILS NFR BLD AUTO: 1 %
BASOPHILS NFR BLD: 0 % (ref 0–1)
BUN SERPL-MCNC: 18 MG/DL (ref 6–20)
BUN/CREAT SERPL: 15 (ref 12–20)
CA-I BLD-MCNC: 9 MG/DL (ref 8.5–10.1)
CD3+CD4+ CELLS # BLD: 604 /UL
CD3+CD4+ CELLS NFR BLD: 35.5 %
CHLORIDE SERPL-SCNC: 120 MMOL/L (ref 97–108)
CO2 SERPL-SCNC: 26 MMOL/L (ref 21–32)
CREAT SERPL-MCNC: 1.19 MG/DL (ref 0.7–1.3)
DIFFERENTIAL METHOD BLD: NORMAL
EOSINOPHIL # BLD AUTO: 0.2 X10E3/UL
EOSINOPHIL # BLD: 0.3 K/UL (ref 0–0.4)
EOSINOPHIL NFR BLD AUTO: 2 %
EOSINOPHIL NFR BLD: 3 % (ref 0–7)
ERYTHROCYTE [DISTWIDTH] IN BLOOD BY AUTOMATED COUNT: 12.4 %
ERYTHROCYTE [DISTWIDTH] IN BLOOD BY AUTOMATED COUNT: 12.9 % (ref 11.5–14.5)
GLUCOSE SERPL-MCNC: 86 MG/DL (ref 65–100)
HCT VFR BLD AUTO: 39.2 %
HCT VFR BLD AUTO: 40.3 % (ref 36.6–50.3)
HGB BLD-MCNC: 12.8 G/DL
HGB BLD-MCNC: 13.5 G/DL (ref 12.1–17)
HIV1 RNA # SERPL NAA+PROBE: <20 COPIES/ML
HIV1 RNA SERPL NAA+PROBE-LOG#: NORMAL LOG10COPY/ML
IMM GRANULOCYTES # BLD AUTO: 0 K/UL (ref 0–0.04)
IMM GRANULOCYTES # BLD: 0 X10E3/UL
IMM GRANULOCYTES NFR BLD AUTO: 0 % (ref 0–0.5)
IMM GRANULOCYTES NFR BLD: 0 %
LYMPHOCYTES # BLD AUTO: 1.7 X10E3/UL
LYMPHOCYTES # BLD: 1.7 K/UL (ref 0.8–3.5)
LYMPHOCYTES NFR BLD AUTO: 18 %
LYMPHOCYTES NFR BLD: 20 % (ref 12–49)
MCH RBC QN AUTO: 31.9 PG
MCH RBC QN AUTO: 31.9 PG (ref 26–34)
MCHC RBC AUTO-ENTMCNC: 32.7 G/DL
MCHC RBC AUTO-ENTMCNC: 33.5 G/DL (ref 30–36.5)
MCV RBC AUTO: 95.3 FL (ref 80–99)
MCV RBC AUTO: 98 FL
MONOCYTES # BLD AUTO: 0.7 X10E3/UL
MONOCYTES # BLD: 0.5 K/UL (ref 0–1)
MONOCYTES NFR BLD AUTO: 7 %
MONOCYTES NFR BLD: 5 % (ref 5–13)
NEUTROPHILS # BLD AUTO: 6.8 X10E3/UL
NEUTROPHILS NFR BLD AUTO: 72 %
NEUTS SEG # BLD: 6.1 K/UL (ref 1.8–8)
NEUTS SEG NFR BLD: 72 % (ref 32–75)
PLATELET # BLD AUTO: 160 X10E3/UL
PLATELET # BLD AUTO: 176 K/UL (ref 150–400)
PMV BLD AUTO: 10.5 FL (ref 8.9–12.9)
POTASSIUM SERPL-SCNC: 3.4 MMOL/L (ref 3.5–5.1)
RBC # BLD AUTO: 4.01 X10E6/UL
RBC # BLD AUTO: 4.23 M/UL (ref 4.1–5.7)
SODIUM SERPL-SCNC: 151 MMOL/L (ref 136–145)
WBC # BLD AUTO: 8.5 K/UL (ref 4.1–11.1)
WBC # BLD AUTO: 9.5 X10E3/UL

## 2020-11-01 PROCEDURE — 74011250636 HC RX REV CODE- 250/636: Performed by: NURSE PRACTITIONER

## 2020-11-01 PROCEDURE — 85025 COMPLETE CBC W/AUTO DIFF WBC: CPT

## 2020-11-01 PROCEDURE — 74011250637 HC RX REV CODE- 250/637: Performed by: NURSE PRACTITIONER

## 2020-11-01 PROCEDURE — 36415 COLL VENOUS BLD VENIPUNCTURE: CPT

## 2020-11-01 PROCEDURE — 74011636637 HC RX REV CODE- 636/637: Performed by: NURSE PRACTITIONER

## 2020-11-01 PROCEDURE — 80048 BASIC METABOLIC PNL TOTAL CA: CPT

## 2020-11-01 PROCEDURE — 99232 SBSQ HOSP IP/OBS MODERATE 35: CPT | Performed by: INTERNAL MEDICINE

## 2020-11-01 PROCEDURE — 65270000029 HC RM PRIVATE

## 2020-11-01 PROCEDURE — 74011000258 HC RX REV CODE- 258: Performed by: NURSE PRACTITIONER

## 2020-11-01 RX ORDER — POTASSIUM CHLORIDE 1.5 G/1.77G
20 POWDER, FOR SOLUTION ORAL DAILY
Status: DISCONTINUED | OUTPATIENT
Start: 2020-11-02 | End: 2020-11-02 | Stop reason: HOSPADM

## 2020-11-01 RX ADMIN — SENNOSIDES 8.6 MG: 8.6 TABLET, FILM COATED ORAL at 20:06

## 2020-11-01 RX ADMIN — CYANOCOBALAMIN TAB 1000 MCG 1000 MCG: 1000 TAB at 11:09

## 2020-11-01 RX ADMIN — PIPERACILLIN SODIUM AND TAZOBACTAM SODIUM 3.38 G: 3; .375 INJECTION, POWDER, LYOPHILIZED, FOR SOLUTION INTRAVENOUS at 11:10

## 2020-11-01 RX ADMIN — RALTEGRAVIR 400 MG: 400 TABLET, FILM COATED ORAL at 11:09

## 2020-11-01 RX ADMIN — EMTRICITABINE AND TENOFOVIR DISOPROXIL FUMARATE 1 TABLET: 200; 300 TABLET, FILM COATED ORAL at 09:00

## 2020-11-01 RX ADMIN — PIPERACILLIN SODIUM AND TAZOBACTAM SODIUM 3.38 G: 3; .375 INJECTION, POWDER, LYOPHILIZED, FOR SOLUTION INTRAVENOUS at 19:41

## 2020-11-01 RX ADMIN — PANTOPRAZOLE SODIUM 40 MG: 40 GRANULE, DELAYED RELEASE ORAL at 11:09

## 2020-11-01 RX ADMIN — THIAMINE HCL TAB 100 MG 100 MG: 100 TAB at 11:09

## 2020-11-01 RX ADMIN — RALTEGRAVIR 400 MG: 400 TABLET, FILM COATED ORAL at 20:06

## 2020-11-01 RX ADMIN — ZIPRASIDONE HCL 40 MG: 40 CAPSULE ORAL at 11:09

## 2020-11-01 RX ADMIN — Medication 10 ML: at 06:28

## 2020-11-01 RX ADMIN — Medication 10 ML: at 13:47

## 2020-11-01 RX ADMIN — DOCUSATE SODIUM 100 MG: 50 LIQUID ORAL at 20:06

## 2020-11-01 RX ADMIN — PREDNISONE 20 MG: 20 TABLET ORAL at 11:09

## 2020-11-01 RX ADMIN — DOCUSATE SODIUM 100 MG: 50 LIQUID ORAL at 11:09

## 2020-11-01 NOTE — PROGRESS NOTES
Pulmonary Progress Note               Daily Progress Note: 11/1/2020      Subjective: The patient is seen for follow  up. Excerpts from admission 10/28/2020 or consult notes as follows:   81year old male from [address] [place] [address] came in because of fever and vomiting he is nonverbal bedridden HIV-positive unable to get any history out of the patient he was admitted to the hospital previously because of pneumonia which was Klebsiella he was put on 100% nonrebreather mask in the emergency room which has been changed to [address] open eyes but does not follow any command so admitted and pulmonary consult was called to further evaluation.     Currently on room air. COVID-19 Negative. Chest Xray 10/31 showed mild decreased airspace disease at the base of the right lung. No significant  change of the interstitial and airspace disease in the mid and basilar left lung. Fecal occult blood was positive 10/28. Hb 10/28 14.6. Gastroenterology is monitoring, no need for endoscopy at this time.     Problem List:  Problem List as of 11/1/2020 Date Reviewed: 10/29/2020          Codes Class Noted - Resolved    Pneumonia ICD-10-CM: J18.9  ICD-9-CM: 797  10/29/2020 - Present        SOB (shortness of breath) ICD-10-CM: R06.02  ICD-9-CM: 786.05  9/12/2020 - Present              Medications reviewed  Current Facility-Administered Medications   Medication Dose Route Frequency    piperacillin-tazobactam (ZOSYN) 3.375 g in 0.9% sodium chloride (MBP/ADV) 100 mL MBP  3.375 g IntraVENous Q8H    albuterol CONCENTRATE 2.5mg/0.5 mL neb soln  2.5 mg Nebulization Q6H PRN    cyanocobalamin tablet 1,000 mcg  1,000 mcg Oral DAILY    docusate (COLACE) 50 mg/5 mL oral liquid 100 mg  100 mg Per G Tube BID    emtricitabine-tenofovir (TDF) (TRUVADA) 200-300 mg per tablet 1 Tab  1 Tab Oral DAILY    fentaNYL (DURAGESIC) 25 mcg/hr patch 1 Patch  1 Patch TransDERmal Q72H    pantoprazole (PROTONIX) granules for oral suspension 40 mg 40 mg Per G Tube ACB    raltegravir (ISENTRESS) tablet 400 mg  400 mg Oral BID    senna (SENOKOT) tablet 8.6 mg  1 Tab Per G Tube QHS    thiamine mononitrate (B-1) tablet 100 mg  100 mg Per G Tube DAILY    ziprasidone (GEODON) capsule 40 mg  40 mg Oral DAILY    sodium chloride (NS) flush 5-40 mL  5-40 mL IntraVENous Q8H    sodium chloride (NS) flush 5-40 mL  5-40 mL IntraVENous PRN    acetaminophen (TYLENOL) tablet 650 mg  650 mg Per J Tube Q4H PRN    predniSONE (DELTASONE) tablet 20 mg  20 mg Oral DAILY    acetaminophen (TYLENOL) suppository 650 mg  650 mg Rectal Q4H PRN    ondansetron (ZOFRAN) injection 4 mg  4 mg IntraVENous Q6H PRN       Review of Systems:   Unable to obtain    Objective:   Physical Exam:     Visit Vitals  BP (!) 141/78   Pulse 75   Temp 97.8 °F (36.6 °C)   Resp 18   Ht 6' 0.01\" (1.829 m)   Wt 175 lb (79.4 kg)   SpO2 95%   BMI 23.73 kg/m²    O2 Flow Rate (L/min): 6 l/min O2 Device: Room air    Temp (24hrs), Av.6 °F (36.4 °C), Min:97.2 °F (36.2 °C), Max:97.9 °F (36.6 °C)    No intake/output data recorded. 10/30 190 -  0700  In: 450   Out: 250 [Urine:250]    Constitutional: He appears distressed. Nonverbal   HENT:   Head: Normocephalic and atraumatic. Neck: Normal range of motion. Cardiovascular: Normal rate and normal heart sounds. Pulmonary/Chest: He is in respiratory distress. He has rales. Abdominal: Bowel sounds are normal.   Musculoskeletal: Normal range of motion.    Neurological:   Nonverbal       Data Review:       Recent Days:  Recent Labs     10/31/20  1135 10/30/20  0740 10/30/20  0640   WBC 7.4 9.7 9.5   HGB 13.2 12.7 12.8*   HCT 39.8 38.2 39.2   * 162 160     Recent Labs     10/30/20  0740   *   K 3.6   *   CO2 26   GLU 89   BUN 14   CREA 1.09   CA 9.0   ALB 2.8*   TBILI 1.2*   ALT 17     Recent Labs     10/29/20  0953   PH 7.430   PCO2 41   PO2 145*   HCO3 27*       24 Hour Results:  Recent Results (from the past 24 hour(s))   CBC WITH AUTOMATED DIFF    Collection Time: 10/31/20 11:35 AM   Result Value Ref Range    WBC 7.4 4.1 - 11.1 K/uL    RBC 4.20 4. 10 - 5.70 M/uL    HGB 13.2 12.1 - 17.0 g/dL    HCT 39.8 36.6 - 50.3 %    MCV 94.8 80.0 - 99.0 FL    MCH 31.4 26.0 - 34.0 PG    MCHC 33.2 30.0 - 36.5 g/dL    RDW 12.8 11.5 - 14.5 %    PLATELET 528 (L) 722 - 400 K/uL    MPV 11.6 8.9 - 12.9 FL    NEUTROPHILS 71 32 - 75 %    LYMPHOCYTES 19 12 - 49 %    MONOCYTES 7 5 - 13 %    EOSINOPHILS 3 0 - 7 %    BASOPHILS 0 0 - 1 %    IMMATURE GRANULOCYTES 0 0.0 - 0.5 %    ABS. NEUTROPHILS 5.3 1.8 - 8.0 K/UL    ABS. LYMPHOCYTES 1.4 0.8 - 3.5 K/UL    ABS. MONOCYTES 0.5 0.0 - 1.0 K/UL    ABS. EOSINOPHILS 0.2 0.0 - 0.4 K/UL    ABS. BASOPHILS 0.0 0.0 - 0.1 K/UL    ABS. IMM. GRANS. 0.0 0.00 - 0.04 K/UL    DF AUTOMATED     GLUCOSE, POC    Collection Time: 10/31/20  4:12 PM   Result Value Ref Range    Glucose (POC) 119 (H) 65 - 100 mg/dL    Performed by Berenice Maya, POC    Collection Time: 10/31/20  9:24 PM   Result Value Ref Range    Glucose (POC) 89 65 - 100 mg/dL    Performed by Kathi Petty      Imaging:             CXR Results  (Last 48 hours)                 10/28/20 2236   XR CHEST SNGL V Final result     Impression:   IMPRESSION: There is persistent airspace disease within the lung bases without   resolution when compared to prior imaging. Although these findings could   represent recurrent aspiration pneumonia other chronic airspace processes cannot   be fully excluded. 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        Narrative:   This study is a portable radiograph of the chest dated 10/28/2020. HISTORY: Fever aspiration most likely. COMPARISON: 9/10/2020. FINDINGS: There is normal size to the cardiac/pericardial silhouette. The   pulmonary vessels are normal in caliber.        There is airspace disease within the mid to lower lung zones associated with an   elevation of the hemidiaphragms. There are similar airspace disease in prior   imaging although there is a greater component of airspace disease within the mid   lung zones and now the airspace process is limited to the lung bases. The costophrenic angles are maintained without pleural effusions. There is   mild-to-moderate ectasia of the thoracic aorta. Otherwise, the mediastinal   structures are normal. The bony thorax is intact.                      Results from Hospital Encounter encounter on 10/28/20   XR CHEST SNGL V     Narrative This study is a portable radiograph of the chest dated 10/28/2020.     HISTORY: Fever aspiration most likely.     COMPARISON: 9/10/2020.     FINDINGS: There is normal size to the cardiac/pericardial silhouette. The  pulmonary vessels are normal in caliber.     There is airspace disease within the mid to lower lung zones associated with an  elevation of the hemidiaphragms. There are similar airspace disease in prior  imaging although there is a greater component of airspace disease within the mid  lung zones and now the airspace process is limited to the lung bases.     The costophrenic angles are maintained without pleural effusions. There is  mild-to-moderate ectasia of the thoracic aorta. Otherwise, the mediastinal  structures are normal. The bony thorax is intact.        Impression IMPRESSION: There is persistent airspace disease within the lung bases without  resolution when compared to prior imaging. Although these findings could  represent recurrent aspiration pneumonia other chronic airspace processes cannot  be fully excluded. Cryptogenic organized pneumonia can present with chronic  peribronchial and perivascular parenchymal opacification.      No results found for this or any previous visit.        IMPRESSION:   1. Acute hypoxic respiratory failure   2. Aspiration pneumonia   3. HIV positive  4. History of hepatitis C  5.  Pt is requiring Drug therapy requiring intensive monitoring for toxicity  6. Prognosis guarded        RECOMMENDATIONS/PLAN:      1. Patient is on room air  2. Agree with Empiric IV antibiotics pending culture results   3. Follow culture results. CXR 10/31 shows mild decreased airspace disease at the base of the right lung and no significant change of the interstitial and airspace disease in the mid and basilar left lung  4.  Continue antiretroviral therapy for HIV  5. COVID-19 negative                 Dartha Opitz

## 2020-11-01 NOTE — PROGRESS NOTES
Problem: Falls - Risk of  Goal: *Absence of Falls  Description: Document Yinka Apo Fall Risk and appropriate interventions in the flowsheet. Outcome: Progressing Towards Goal  Note: Fall Risk Interventions:       Mentation Interventions: Bed/chair exit alarm, Increase mobility         Elimination Interventions: Bed/chair exit alarm, Call light in reach              Problem: Patient Education: Go to Patient Education Activity  Goal: Patient/Family Education  Outcome: Progressing Towards Goal     Problem: Pressure Injury - Risk of  Goal: *Prevention of pressure injury  Description: Document Jw Scale and appropriate interventions in the flowsheet. Outcome: Progressing Towards Goal  Note: Pressure Injury Interventions:  Sensory Interventions: Assess changes in LOC, Assess need for specialty bed, Avoid rigorous massage over bony prominences, Keep linens dry and wrinkle-free, Maintain/enhance activity level, Minimize linen layers, Monitor skin under medical devices    Moisture Interventions: Maintain skin hydration (lotion/cream), Minimize layers, Moisture barrier    Activity Interventions: Increase time out of bed, Pressure redistribution bed/mattress(bed type), PT/OT evaluation    Mobility Interventions: HOB 30 degrees or less, Pressure redistribution bed/mattress (bed type), PT/OT evaluation    Nutrition Interventions: Document food/fluid/supplement intake    Friction and Shear Interventions: HOB 30 degrees or less, Lift sheet, Lift team/patient mobility team, Minimize layers                Problem: Patient Education: Go to Patient Education Activity  Goal: Patient/Family Education  Outcome: Progressing Towards Goal     Problem: Nutrition Deficit  Goal: *Optimize nutritional status  Outcome: Progressing Towards Goal     Problem: Falls - Risk of  Goal: *Absence of Falls  Description: Document Rocky Fall Risk and appropriate interventions in the flowsheet.   Outcome: Progressing Towards Goal  Note: Fall Risk Interventions:       Mentation Interventions: Bed/chair exit alarm, Increase mobility         Elimination Interventions: Bed/chair exit alarm, Call light in reach              Problem: Patient Education: Go to Patient Education Activity  Goal: Patient/Family Education  Outcome: Progressing Towards Goal     Problem: Pressure Injury - Risk of  Goal: *Prevention of pressure injury  Description: Document Jw Scale and appropriate interventions in the flowsheet.   Outcome: Progressing Towards Goal  Note: Pressure Injury Interventions:  Sensory Interventions: Assess changes in LOC, Assess need for specialty bed, Avoid rigorous massage over bony prominences, Keep linens dry and wrinkle-free, Maintain/enhance activity level, Minimize linen layers, Monitor skin under medical devices    Moisture Interventions: Maintain skin hydration (lotion/cream), Minimize layers, Moisture barrier    Activity Interventions: Increase time out of bed, Pressure redistribution bed/mattress(bed type), PT/OT evaluation    Mobility Interventions: HOB 30 degrees or less, Pressure redistribution bed/mattress (bed type), PT/OT evaluation    Nutrition Interventions: Document food/fluid/supplement intake    Friction and Shear Interventions: HOB 30 degrees or less, Lift sheet, Lift team/patient mobility team, Minimize layers                Problem: Patient Education: Go to Patient Education Activity  Goal: Patient/Family Education  Outcome: Progressing Towards Goal     Problem: Nutrition Deficit  Goal: *Optimize nutritional status  Outcome: Progressing Towards Goal

## 2020-11-01 NOTE — PROGRESS NOTES
Infectious Disease Progress Note               Subjective:   Transferred out of 4 E., remains unresponsive, not following commands. No acute events since last seen. Objective:   Physical Exam:     Visit Vitals  /78 (BP 1 Location: Right arm)   Pulse 74   Temp 98.5 °F (36.9 °C)   Resp 18   Ht 6' 0.01\" (1.829 m)   Wt 79.4 kg (175 lb)   SpO2 95%   BMI 23.73 kg/m²    O2 Flow Rate (L/min): 6 l/min O2 Device: Room air    Temp (24hrs), Av.1 °F (36.7 °C), Min:97.8 °F (36.6 °C), Max:98.5 °F (36.9 °C)    701 - 1900  In: 420 [P.O.:100]  Out: -    10/30 1901 - 700  In: 450   Out: 250 [Urine:250]    General: NAD, alert, non-conversant   HEENT: RENAY, Moist mucosa   Lungs: CTA b/l, decreased at the bases no wheeze/rhonchi   Heart: S1S2+, RRR, no murmur  Abdo: Soft, NT, ND, +BS, + peg tube   :  + indwelling izaguirre   Exts: No edema, + pulses b/l   Skin: No wounds, No rashes or lesions    Data Review:       Recent Days:  Recent Labs     20  1147 10/31/20  1135 10/30/20  0740   WBC 8.5 7.4 9.7   HGB 13.5 13.2 12.7   HCT 40.3 39.8 38.2    142* 162     Recent Labs     20  1147 10/30/20  0740   BUN 18 14   CREA 1.19 1.09       Lab Results   Component Value Date/Time    C-Reactive protein 9.50 (H) 10/30/2020 07:40 AM       Microbiology   - Blood Cx 10/28: No growth     Diagnostics   CXR Results  (Last 48 hours)               10/31/20 1032  XR CHEST PORT Final result    Impression:  Impression:       Mild decreased airspace disease at the base of the right lung. No significant   change of the interstitial and airspace disease in the mid and basilar left   lung. Narrative:  Examination: Frontal radiograph of the chest, 10/31/2020       Clinical information: pneumonia       Comparison: 10/28/2020       Findings: The cardiomediastinal silhouette is normal in size and contours. There is no   pulmonary vascular congestion.  The lungs are hypoinflated with multifocal   airspace and interstitial opacities in the mid and basilar left lung. There are   mild decreased airspace opacities at the base of the right lung. No pleural   effusion or pneumothorax. Assessment/Plan     1. Acute hypoxia due aspiration PNA, b/l airspace disease on CXR       Maintaining O2 sats on RA, no cough       Afebrile, WBC remains WNLs       On day # 4/14 of Zosyn, may d/c on Augmentin 875 mg BID for the remainder of therapy       Fortunately pt remains at high risk for aspiration PNA, not preventable w long-term Abx      Recommend aspiration precaution including keeping HOB at 30 degrees at all times      2. HIV infection: Continue on, isentress, and Truvada. Repeat HIV RNA VL and T cell count are pending      3. Dysphagia: S/p peg tube placement      4.  H/o chronic hep C, HCV RNA VL is pending      5.  AMS/decreased responsiveness: Baseline mentation unknown     Kisha Flor MD    11/1/2020

## 2020-11-01 NOTE — PROGRESS NOTES
Patient transferred to room 566. Patient stable at time of transfer. Report given to Isaias Stack RN. Patient skin intact at time of transfer.

## 2020-11-01 NOTE — PROGRESS NOTES
Hospitalist Progress Note    Subjective:   Daily Progress Note: 11/1/2020 2:30 PM    Hospital Course:  Hospital Course:  81 y. o. male nonverbal noncommunicative with chronically bedridden status, HIV, recent [phone] positive presents to the emergency room from [facility] by emergency crew as they found him vomiting coffee-ground emesis.  Suspected of aspiration pneumonia.  This patient was recently hospitalized for more than a month due to Klebsiella pneumonia pneumonia that was treated with improvement.  He presented with acute respiratory failure last admission. Chest x-ray suggestive of no improvement from pneumonia findings from previous x-ray. Subjective: Pt seen in room, giving bolus TF due to no feeding pump available at current time.   Pt awake, non verbal, little spontaneous movement on own    Current Facility-Administered Medications   Medication Dose Route Frequency    piperacillin-tazobactam (ZOSYN) 3.375 g in 0.9% sodium chloride (MBP/ADV) 100 mL MBP  3.375 g IntraVENous Q8H    albuterol CONCENTRATE 2.5mg/0.5 mL neb soln  2.5 mg Nebulization Q6H PRN    cyanocobalamin tablet 1,000 mcg  1,000 mcg Oral DAILY    docusate (COLACE) 50 mg/5 mL oral liquid 100 mg  100 mg Per G Tube BID    emtricitabine-tenofovir (TDF) (TRUVADA) 200-300 mg per tablet 1 Tab  1 Tab Oral DAILY    fentaNYL (DURAGESIC) 25 mcg/hr patch 1 Patch  1 Patch TransDERmal Q72H    pantoprazole (PROTONIX) granules for oral suspension 40 mg  40 mg Per G Tube ACB    raltegravir (ISENTRESS) tablet 400 mg  400 mg Oral BID    senna (SENOKOT) tablet 8.6 mg  1 Tab Per G Tube QHS    thiamine mononitrate (B-1) tablet 100 mg  100 mg Per G Tube DAILY    ziprasidone (GEODON) capsule 40 mg  40 mg Oral DAILY    sodium chloride (NS) flush 5-40 mL  5-40 mL IntraVENous Q8H    sodium chloride (NS) flush 5-40 mL  5-40 mL IntraVENous PRN    acetaminophen (TYLENOL) tablet 650 mg  650 mg Per J Tube Q4H PRN    predniSONE (DELTASONE) tablet 20 mg  20 mg Oral DAILY    acetaminophen (TYLENOL) suppository 650 mg  650 mg Rectal Q4H PRN    ondansetron (ZOFRAN) injection 4 mg  4 mg IntraVENous Q6H PRN        Review of Systems:    VINH   Unable to obtain due to non verbal, advanced dementia  Objective:     Visit Vitals  /78 (BP 1 Location: Right arm)   Pulse 74   Temp 98.5 °F (36.9 °C)   Resp 18   Ht 6' 0.01\" (1.829 m)   Wt 79.4 kg (175 lb)   SpO2 95%   BMI 23.73 kg/m²    O2 Flow Rate (L/min): 6 l/min O2 Device: Room air    Temp (24hrs), Av.1 °F (36.7 °C), Min:97.8 °F (36.6 °C), Max:98.5 °F (36.9 °C)      701 - 1900  In: 420 [P.O.:100]  Out: -   10/30 1901 -  07  In: 450   Out: 250 [Urine:250]    PHYSICAL EXAM:    Physical Exam     Constitutional: He appears well-developed. No distress. Cardiovascular: Normal rate, regular rhythm and intact distal pulses.   Murmur heard. Pulmonary/Chest:. He has scattered upper airway coarse sounds, diminished in bases.   Abdominal: Soft. Bowel sounds are normal.   PEG tube intact , clamped  Genitourinary:    Penis normal.      Genitourinary Comments:condom  catheter patent, clear yellow urine    Musculoskeletal:         General: Deformity present.      Comments: Rigid,  Semi contracted with arms, legs  Neurological: He is alert.   Non verbal   Skin: Skin is warm and dry.   Data Review    Recent Results (from the past 24 hour(s))   GLUCOSE, POC    Collection Time: 10/31/20  4:12 PM   Result Value Ref Range    Glucose (POC) 119 (H) 65 - 100 mg/dL    Performed by Rickey Kincaid    GLUCOSE, POC    Collection Time: 10/31/20  9:24 PM   Result Value Ref Range    Glucose (POC) 89 65 - 100 mg/dL    Performed by Dieudonne Borden, BASIC    Collection Time: 20 11:47 AM   Result Value Ref Range    Sodium 151 (H) 136 - 145 mmol/L    Potassium 3.4 (L) 3.5 - 5.1 mmol/L    Chloride 120 (H) 97 - 108 mmol/L    CO2 26 21 - 32 mmol/L    Anion gap 5 5 - 15 mmol/L    Glucose 86 65 - 100 mg/dL    BUN 18 6 - 20 mg/dL    Creatinine 1.19 0.70 - 1.30 mg/dL    BUN/Creatinine ratio 15 12 - 20      GFR est AA >60 >60 ml/min/1.73m2    GFR est non-AA >60 >60 ml/min/1.73m2    Calcium 9.0 8.5 - 10.1 mg/dL   CBC WITH AUTOMATED DIFF    Collection Time: 11/01/20 11:47 AM   Result Value Ref Range    WBC 8.5 4.1 - 11.1 K/uL    RBC 4.23 4. 10 - 5.70 M/uL    HGB 13.5 12.1 - 17.0 g/dL    HCT 40.3 36.6 - 50.3 %    MCV 95.3 80.0 - 99.0 FL    MCH 31.9 26.0 - 34.0 PG    MCHC 33.5 30.0 - 36.5 g/dL    RDW 12.9 11.5 - 14.5 %    PLATELET 128 148 - 702 K/uL    MPV 10.5 8.9 - 12.9 FL    NEUTROPHILS 72 32 - 75 %    LYMPHOCYTES 20 12 - 49 %    MONOCYTES 5 5 - 13 %    EOSINOPHILS 3 0 - 7 %    BASOPHILS 0 0 - 1 %    IMMATURE GRANULOCYTES 0 0.0 - 0.5 %    ABS. NEUTROPHILS 6.1 1.8 - 8.0 K/UL    ABS. LYMPHOCYTES 1.7 0.8 - 3.5 K/UL    ABS. MONOCYTES 0.5 0.0 - 1.0 K/UL    ABS. EOSINOPHILS 0.3 0.0 - 0.4 K/UL    ABS. BASOPHILS 0.0 0.0 - 0.1 K/UL    ABS. IMM. GRANS. 0.0 0.00 - 0.04 K/UL    DF AUTOMATED         XR CHEST PORT   Final Result   Impression:      Mild decreased airspace disease at the base of the right lung. No significant   change of the interstitial and airspace disease in the mid and basilar left   lung. XR CHEST SNGL V   Final Result   IMPRESSION: There is persistent airspace disease within the lung bases without   resolution when compared to prior imaging. Although these findings could   represent recurrent aspiration pneumonia other chronic airspace processes cannot   be fully excluded. 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 Active Problems:    Pneumonia (10/29/2020)        Assessment/Plan:        1. Aspiration pneumonia- ID following, on IV zosyn,  Pulmonary following, pt on room air, monitor pulse oximetry, repeat CXR showing decreased air space in right lung, no change in left lung. covid 19 test is negative.     2.  Advanced dementia/dysphagia- bolus  ml q6hr, 200 ml water q6hr  Nutrition following, nursing order placed in chart for instructions.      3. History of schizophrenia- on geodon.     4. History of HIV- continue medications.     5. GI prophylaxis- PPI.     6. Hematemesis/occult blood positive- no further s/s bleeding, HGB stable, GI following,      7. Discharge - back to [facility] tomorrow.      DVT Prophylaxis:  Code Status:  Partial Code  POA:    Care Plan discussed with:   _________staff nurse, pulmonary, ID______________________________________________________    Ladi Clements NP

## 2020-11-01 NOTE — PROGRESS NOTES
Pulmonary Progress Note               Daily Progress Note: 11/1/2020      Subjective: The patient is seen for follow  up. Excerpts from admission 10/28/2020 or consult notes as follows:   81year old male from [address] [place] [address] came in because of fever and vomiting he is nonverbal bedridden HIV-positive unable to get any history out of the patient he was admitted to the hospital previously because of pneumonia which was Klebsiella he was put on 100% nonrebreather mask in the emergency room which has been changed to [address] open eyes but does not follow any command so admitted and pulmonary consult was called to further evaluation.     Currently on room air. COVID-19 Negative. Chest Xray 10/31 showed mild decreased airspace disease at the base of the right lung. No significant  change of the interstitial and airspace disease in the mid and basilar left lung. Fecal occult blood was positive 10/28. Hb 10/28 14.6. Gastroenterology is monitoring, no need for endoscopy at this time.     Problem List:  Problem List as of 11/1/2020 Date Reviewed: 10/29/2020          Codes Class Noted - Resolved    Pneumonia ICD-10-CM: J18.9  ICD-9-CM: 719  10/29/2020 - Present        SOB (shortness of breath) ICD-10-CM: R06.02  ICD-9-CM: 786.05  9/12/2020 - Present              Medications reviewed  Current Facility-Administered Medications   Medication Dose Route Frequency    piperacillin-tazobactam (ZOSYN) 3.375 g in 0.9% sodium chloride (MBP/ADV) 100 mL MBP  3.375 g IntraVENous Q8H    albuterol CONCENTRATE 2.5mg/0.5 mL neb soln  2.5 mg Nebulization Q6H PRN    cyanocobalamin tablet 1,000 mcg  1,000 mcg Oral DAILY    docusate (COLACE) 50 mg/5 mL oral liquid 100 mg  100 mg Per G Tube BID    emtricitabine-tenofovir (TDF) (TRUVADA) 200-300 mg per tablet 1 Tab  1 Tab Oral DAILY    fentaNYL (DURAGESIC) 25 mcg/hr patch 1 Patch  1 Patch TransDERmal Q72H    pantoprazole (PROTONIX) granules for oral suspension 40 mg 40 mg Per G Tube ACB    raltegravir (ISENTRESS) tablet 400 mg  400 mg Oral BID    senna (SENOKOT) tablet 8.6 mg  1 Tab Per G Tube QHS    thiamine mononitrate (B-1) tablet 100 mg  100 mg Per G Tube DAILY    ziprasidone (GEODON) capsule 40 mg  40 mg Oral DAILY    sodium chloride (NS) flush 5-40 mL  5-40 mL IntraVENous Q8H    sodium chloride (NS) flush 5-40 mL  5-40 mL IntraVENous PRN    acetaminophen (TYLENOL) tablet 650 mg  650 mg Per J Tube Q4H PRN    predniSONE (DELTASONE) tablet 20 mg  20 mg Oral DAILY    acetaminophen (TYLENOL) suppository 650 mg  650 mg Rectal Q4H PRN    ondansetron (ZOFRAN) injection 4 mg  4 mg IntraVENous Q6H PRN       Review of Systems:   Unable to obtain    Objective:   Physical Exam:     Visit Vitals  /78 (BP 1 Location: Right arm)   Pulse 74   Temp 98.5 °F (36.9 °C)   Resp 18   Ht 6' 0.01\" (1.829 m)   Wt 79.4 kg (175 lb)   SpO2 95%   BMI 23.73 kg/m²    O2 Flow Rate (L/min): 6 l/min O2 Device: Room air    Temp (24hrs), Av.9 °F (36.6 °C), Min:97.2 °F (36.2 °C), Max:98.5 °F (36.9 °C)    701 - 1900  In: 220 [P.O.:100]  Out: -    10/30 1901 - 700  In: 450   Out: 250 [Urine:250]    Constitutional: He appears distressed. Nonverbal   HENT:   Head: Normocephalic and atraumatic. Neck: Normal range of motion. Cardiovascular: Normal rate and normal heart sounds. Pulmonary/Chest: He is in respiratory distress. He has rales. Abdominal: Bowel sounds are normal.   Musculoskeletal: Normal range of motion. Neurological:   Nonverbal       Data Review:       Recent Days:  Recent Labs     10/31/20  1135 10/30/20  0740 10/30/20  0640   WBC 7.4 9.7 9.5   HGB 13.2 12.7 12.8*   HCT 39.8 38.2 39.2   * 162 160     Recent Labs     10/30/20  0740   *   K 3.6   *   CO2 26   GLU 89   BUN 14   CREA 1.09   CA 9.0   ALB 2.8*   TBILI 1.2*   ALT 17     No results for input(s): PH, PCO2, PO2, HCO3, FIO2 in the last 72 hours.     24 Hour Results:  Recent Results (from the past 24 hour(s))   CBC WITH AUTOMATED DIFF    Collection Time: 10/31/20 11:35 AM   Result Value Ref Range    WBC 7.4 4.1 - 11.1 K/uL    RBC 4.20 4. 10 - 5.70 M/uL    HGB 13.2 12.1 - 17.0 g/dL    HCT 39.8 36.6 - 50.3 %    MCV 94.8 80.0 - 99.0 FL    MCH 31.4 26.0 - 34.0 PG    MCHC 33.2 30.0 - 36.5 g/dL    RDW 12.8 11.5 - 14.5 %    PLATELET 693 (L) 296 - 400 K/uL    MPV 11.6 8.9 - 12.9 FL    NEUTROPHILS 71 32 - 75 %    LYMPHOCYTES 19 12 - 49 %    MONOCYTES 7 5 - 13 %    EOSINOPHILS 3 0 - 7 %    BASOPHILS 0 0 - 1 %    IMMATURE GRANULOCYTES 0 0.0 - 0.5 %    ABS. NEUTROPHILS 5.3 1.8 - 8.0 K/UL    ABS. LYMPHOCYTES 1.4 0.8 - 3.5 K/UL    ABS. MONOCYTES 0.5 0.0 - 1.0 K/UL    ABS. EOSINOPHILS 0.2 0.0 - 0.4 K/UL    ABS. BASOPHILS 0.0 0.0 - 0.1 K/UL    ABS. IMM. GRANS. 0.0 0.00 - 0.04 K/UL    DF AUTOMATED     GLUCOSE, POC    Collection Time: 10/31/20  4:12 PM   Result Value Ref Range    Glucose (POC) 119 (H) 65 - 100 mg/dL    Performed by Cat Petty, POC    Collection Time: 10/31/20  9:24 PM   Result Value Ref Range    Glucose (POC) 89 65 - 100 mg/dL    Performed by Ricardo Ulrich      Imaging:             CXR Results  (Last 48 hours)                 10/28/20 2236   XR CHEST SNGL V Final result     Impression:   IMPRESSION: There is persistent airspace disease within the lung bases without   resolution when compared to prior imaging. Although these findings could   represent recurrent aspiration pneumonia other chronic airspace processes cannot   be fully excluded. 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        Narrative:   This study is a portable radiograph of the chest dated 10/28/2020. HISTORY: Fever aspiration most likely. COMPARISON: 9/10/2020. FINDINGS: There is normal size to the cardiac/pericardial silhouette. The   pulmonary vessels are normal in caliber.        There is airspace disease within the mid to lower lung zones associated with an   elevation of the hemidiaphragms. There are similar airspace disease in prior   imaging although there is a greater component of airspace disease within the mid   lung zones and now the airspace process is limited to the lung bases. The costophrenic angles are maintained without pleural effusions. There is   mild-to-moderate ectasia of the thoracic aorta. Otherwise, the mediastinal   structures are normal. The bony thorax is intact.                      Results from Hospital Encounter encounter on 10/28/20   XR CHEST SNGL V     Narrative This study is a portable radiograph of the chest dated 10/28/2020.     HISTORY: Fever aspiration most likely.     COMPARISON: 9/10/2020.     FINDINGS: There is normal size to the cardiac/pericardial silhouette. The  pulmonary vessels are normal in caliber.     There is airspace disease within the mid to lower lung zones associated with an  elevation of the hemidiaphragms. There are similar airspace disease in prior  imaging although there is a greater component of airspace disease within the mid  lung zones and now the airspace process is limited to the lung bases.     The costophrenic angles are maintained without pleural effusions. There is  mild-to-moderate ectasia of the thoracic aorta. Otherwise, the mediastinal  structures are normal. The bony thorax is intact.        Impression IMPRESSION: There is persistent airspace disease within the lung bases without  resolution when compared to prior imaging. Although these findings could  represent recurrent aspiration pneumonia other chronic airspace processes cannot  be fully excluded. Cryptogenic organized pneumonia can present with chronic  peribronchial and perivascular parenchymal opacification.      No results found for this or any previous visit.        IMPRESSION:   1. Acute hypoxic respiratory failure  resolved  2. Aspiration pneumonia   3. HIV positive  4.  History of hepatitis C  5. Pt is requiring Drug therapy requiring intensive monitoring for toxicity  6. Prognosis guarded        RECOMMENDATIONS/PLAN:      1. Patient is on room air  2. Agree with Empiric IV antibiotics pending culture results   3. Follow culture results. CXR 10/31 shows mild decreased airspace disease at the base of the right lung and no significant change of the interstitial and airspace disease in the mid and basilar left lung  4.  Continue antiretroviral therapy for HIV  5. COVID-19 negative       Possible discharge in am          Flor Castro MD

## 2020-11-01 NOTE — ROUTINE PROCESS
Bedside shift change report given to Joselyn Felty, RN (oncoming nurse) by Boom Mancia (offgoing nurse). Report included the following information SBAR.

## 2020-11-02 VITALS
DIASTOLIC BLOOD PRESSURE: 76 MMHG | TEMPERATURE: 98.2 F | OXYGEN SATURATION: 96 % | BODY MASS INDEX: 23.7 KG/M2 | HEART RATE: 79 BPM | RESPIRATION RATE: 20 BRPM | HEIGHT: 72 IN | SYSTOLIC BLOOD PRESSURE: 108 MMHG | WEIGHT: 175 LBS

## 2020-11-02 LAB
GLUCOSE BLD STRIP.AUTO-MCNC: 76 MG/DL (ref 65–100)
GLUCOSE BLD STRIP.AUTO-MCNC: 91 MG/DL (ref 65–100)
PERFORMED BY, TECHID: NORMAL
PERFORMED BY, TECHID: NORMAL

## 2020-11-02 PROCEDURE — 74011250637 HC RX REV CODE- 250/637: Performed by: NURSE PRACTITIONER

## 2020-11-02 PROCEDURE — 99232 SBSQ HOSP IP/OBS MODERATE 35: CPT | Performed by: INTERNAL MEDICINE

## 2020-11-02 PROCEDURE — 82962 GLUCOSE BLOOD TEST: CPT

## 2020-11-02 PROCEDURE — 74011636637 HC RX REV CODE- 636/637: Performed by: NURSE PRACTITIONER

## 2020-11-02 PROCEDURE — 74011250636 HC RX REV CODE- 250/636: Performed by: NURSE PRACTITIONER

## 2020-11-02 PROCEDURE — 74011000258 HC RX REV CODE- 258: Performed by: NURSE PRACTITIONER

## 2020-11-02 RX ORDER — AMOXICILLIN AND CLAVULANATE POTASSIUM 875; 125 MG/1; MG/1
1 TABLET, FILM COATED ORAL EVERY 12 HOURS
Qty: 20 TAB | Refills: 0 | Status: SHIPPED | OUTPATIENT
Start: 2020-11-02 | End: 2020-11-12

## 2020-11-02 RX ORDER — AMOXICILLIN AND CLAVULANATE POTASSIUM 875; 125 MG/1; MG/1
1 TABLET, FILM COATED ORAL EVERY 12 HOURS
Status: DISCONTINUED | OUTPATIENT
Start: 2020-11-02 | End: 2020-11-02 | Stop reason: HOSPADM

## 2020-11-02 RX ADMIN — THIAMINE HCL TAB 100 MG 100 MG: 100 TAB at 09:15

## 2020-11-02 RX ADMIN — PANTOPRAZOLE SODIUM 40 MG: 40 GRANULE, DELAYED RELEASE ORAL at 09:14

## 2020-11-02 RX ADMIN — ZIPRASIDONE HCL 40 MG: 40 CAPSULE ORAL at 09:14

## 2020-11-02 RX ADMIN — CYANOCOBALAMIN TAB 1000 MCG 1000 MCG: 1000 TAB at 09:15

## 2020-11-02 RX ADMIN — EMTRICITABINE AND TENOFOVIR DISOPROXIL FUMARATE 1 TABLET: 200; 300 TABLET, FILM COATED ORAL at 11:14

## 2020-11-02 RX ADMIN — PIPERACILLIN SODIUM AND TAZOBACTAM SODIUM 3.38 G: 3; .375 INJECTION, POWDER, LYOPHILIZED, FOR SOLUTION INTRAVENOUS at 03:05

## 2020-11-02 RX ADMIN — AMOXICILLIN AND CLAVULANATE POTASSIUM 1 TABLET: 875; 125 TABLET, FILM COATED ORAL at 10:27

## 2020-11-02 RX ADMIN — DOCUSATE SODIUM 100 MG: 50 LIQUID ORAL at 09:15

## 2020-11-02 RX ADMIN — POTASSIUM CHLORIDE 20 MEQ: 1.5 FOR SOLUTION ORAL at 09:14

## 2020-11-02 RX ADMIN — RALTEGRAVIR 400 MG: 400 TABLET, FILM COATED ORAL at 09:14

## 2020-11-02 RX ADMIN — PREDNISONE 20 MG: 20 TABLET ORAL at 09:15

## 2020-11-02 NOTE — PROGRESS NOTES
Infectious Disease Progress Note               Subjective:   Pt seen and examined at bedside. Denies new complaints. Remains unresponsive   Objective:   Physical Exam:     Visit Vitals  /76   Pulse 79   Temp 98.2 °F (36.8 °C)   Resp 20   Ht 6' 0.01\" (1.829 m)   Wt 79.4 kg (175 lb)   SpO2 96%   BMI 23.73 kg/m²    O2 Flow Rate (L/min): 6 l/min O2 Device: Room air    Temp (24hrs), Av.1 °F (36.7 °C), Min:97.9 °F (36.6 °C), Max:98.3 °F (36.8 °C)    No intake/output data recorded. 10/31 190 -  0700  In: 1570 [P.O.:400]  Out: 775 [Urine:775]    General: NAD, alert, non-conversant   HEENT: RENAY, Moist mucosa   Lungs: CTA b/l, decreased at the bases no wheeze/rhonchi   Heart: S1S2+, RRR, no murmur  Abdo: Soft, NT, ND, +BS, + peg tube   :  + indwelling izaguirre   Exts: No edema, + pulses b/l   Skin: No wounds, No rashes or lesions    Data Review:       Recent Days:  Recent Labs     20  1147 10/31/20  1135   WBC 8.5 7.4   HGB 13.5 13.2   HCT 40.3 39.8    142*     Recent Labs     20  1147   BUN 18   CREA 1.19       Lab Results   Component Value Date/Time    C-Reactive protein 9.50 (H) 10/30/2020 07:40 AM     Microbiology   - Blood Cx 10/28: No growth     Diagnostics   CXR Results  (Last 48 hours)    None        Assessment/Plan     1. Acute hypoxia due aspiration PNA, b/l airspace disease on CXR       Maintaining O2 sats on RA, no cough       Afebrile, WBC remains WNLs       On day # 5/14 of Zosyn, may d/c on Augmentin 875 mg BID for the remainder of therapy when ready       Routine labs in the morning if not discharged today      Pt remains at high risk for aspiration, needs aspiration precaution      2. HIV infection: Continue on, isentress, and Truvada. Repeat HIV RNA VL and T cell count are pending      3. Dysphagia: S/p peg tube placement      4.  H/o chronic hep C, HCV RNA VL is pending      5.  AMS/decreased responsiveness: Baseline mentation unknown Sree Osorio MD    11/2/2020

## 2020-11-02 NOTE — PROGRESS NOTES
Pulmonary Progress Note               Daily Progress Note: 11/2/2020      Subjective: The patient is seen for follow  up. Excerpts from admission 10/28/2020 or consult notes as follows:   81year old male from [address] [place] [address] came in because of fever and vomiting he is nonverbal bedridden HIV-positive unable to get any history out of the patient he was admitted to the hospital previously because of pneumonia which was Klebsiella he was put on 100% nonrebreather mask in the emergency room which has been changed to [address] open eyes but does not follow any command so admitted and pulmonary consult was called to further evaluation.     Currently on room air. Discharging to [facility] today. COVID-19 Negative. Chest Xray 10/31 showed mild decreased airspace disease at the base of the right lung. No significant  change of the interstitial and airspace disease in the mid and basilar left lung. Fecal occult blood was positive 10/28. Hb 10/28 14.6. Gastroenterology is monitoring, no need for endoscopy at this time.     Problem List:  Problem List as of 11/2/2020 Date Reviewed: 10/29/2020          Codes Class Noted - Resolved    Pneumonia ICD-10-CM: J18.9  ICD-9-CM: 995  10/29/2020 - Present        SOB (shortness of breath) ICD-10-CM: R06.02  ICD-9-CM: 786.05  9/12/2020 - Present              Medications reviewed  Current Facility-Administered Medications   Medication Dose Route Frequency    amoxicillin-clavulanate (AUGMENTIN) 875-125 mg per tablet 1 Tab  1 Tab Per G Tube Q12H    potassium chloride (KLOR-CON) packet for solution 20 mEq  20 mEq PEG Tube DAILY    albuterol CONCENTRATE 2.5mg/0.5 mL neb soln  2.5 mg Nebulization Q6H PRN    cyanocobalamin tablet 1,000 mcg  1,000 mcg Oral DAILY    docusate (COLACE) 50 mg/5 mL oral liquid 100 mg  100 mg Per G Tube BID    emtricitabine-tenofovir (TDF) (TRUVADA) 200-300 mg per tablet 1 Tab  1 Tab Oral DAILY    fentaNYL (DURAGESIC) 25 mcg/hr patch 1 Patch  1 Patch TransDERmal Q72H    pantoprazole (PROTONIX) granules for oral suspension 40 mg  40 mg Per G Tube ACB    raltegravir (ISENTRESS) tablet 400 mg  400 mg Oral BID    senna (SENOKOT) tablet 8.6 mg  1 Tab Per G Tube QHS    thiamine mononitrate (B-1) tablet 100 mg  100 mg Per G Tube DAILY    ziprasidone (GEODON) capsule 40 mg  40 mg Oral DAILY    sodium chloride (NS) flush 5-40 mL  5-40 mL IntraVENous Q8H    sodium chloride (NS) flush 5-40 mL  5-40 mL IntraVENous PRN    acetaminophen (TYLENOL) tablet 650 mg  650 mg Per J Tube Q4H PRN    predniSONE (DELTASONE) tablet 20 mg  20 mg Oral DAILY    acetaminophen (TYLENOL) suppository 650 mg  650 mg Rectal Q4H PRN    ondansetron (ZOFRAN) injection 4 mg  4 mg IntraVENous Q6H PRN       Review of Systems:   Unable to obtain    Objective:   Physical Exam:     Visit Vitals  /76   Pulse 79   Temp 98.2 °F (36.8 °C)   Resp 20   Ht 6' 0.01\" (1.829 m)   Wt 79.4 kg (175 lb)   SpO2 96%   BMI 23.73 kg/m²    O2 Flow Rate (L/min): 6 l/min O2 Device: Room air    Temp (24hrs), Av.1 °F (36.7 °C), Min:97.9 °F (36.6 °C), Max:98.3 °F (36.8 °C)    No intake/output data recorded. 10/31 1901 -  0700  In: 1570 [P.O.:400]  Out: 775 [Urine:775]    Constitutional: He appears distressed. Nonverbal   HENT:   Head: Normocephalic and atraumatic. Neck: Normal range of motion. Cardiovascular: Normal rate and normal heart sounds. Pulmonary/Chest: He is in respiratory distress. He has rales. Abdominal: Bowel sounds are normal.   Musculoskeletal: Normal range of motion. Neurological:   Nonverbal       Data Review:       Recent Days:  Recent Labs     20  1147 10/31/20  1135   WBC 8.5 7.4   HGB 13.5 13.2   HCT 40.3 39.8    142*     Recent Labs     20  1147   *   K 3.4*   *   CO2 26   GLU 86   BUN 18   CREA 1.19   CA 9.0     No results for input(s): PH, PCO2, PO2, HCO3, FIO2 in the last 72 hours.     24 Hour Results:  Recent Results (from the past 24 hour(s))   METABOLIC PANEL, BASIC    Collection Time: 11/01/20 11:47 AM   Result Value Ref Range    Sodium 151 (H) 136 - 145 mmol/L    Potassium 3.4 (L) 3.5 - 5.1 mmol/L    Chloride 120 (H) 97 - 108 mmol/L    CO2 26 21 - 32 mmol/L    Anion gap 5 5 - 15 mmol/L    Glucose 86 65 - 100 mg/dL    BUN 18 6 - 20 mg/dL    Creatinine 1.19 0.70 - 1.30 mg/dL    BUN/Creatinine ratio 15 12 - 20      GFR est AA >60 >60 ml/min/1.73m2    GFR est non-AA >60 >60 ml/min/1.73m2    Calcium 9.0 8.5 - 10.1 mg/dL   CBC WITH AUTOMATED DIFF    Collection Time: 11/01/20 11:47 AM   Result Value Ref Range    WBC 8.5 4.1 - 11.1 K/uL    RBC 4.23 4. 10 - 5.70 M/uL    HGB 13.5 12.1 - 17.0 g/dL    HCT 40.3 36.6 - 50.3 %    MCV 95.3 80.0 - 99.0 FL    MCH 31.9 26.0 - 34.0 PG    MCHC 33.5 30.0 - 36.5 g/dL    RDW 12.9 11.5 - 14.5 %    PLATELET 414 857 - 427 K/uL    MPV 10.5 8.9 - 12.9 FL    NEUTROPHILS 72 32 - 75 %    LYMPHOCYTES 20 12 - 49 %    MONOCYTES 5 5 - 13 %    EOSINOPHILS 3 0 - 7 %    BASOPHILS 0 0 - 1 %    IMMATURE GRANULOCYTES 0 0.0 - 0.5 %    ABS. NEUTROPHILS 6.1 1.8 - 8.0 K/UL    ABS. LYMPHOCYTES 1.7 0.8 - 3.5 K/UL    ABS. MONOCYTES 0.5 0.0 - 1.0 K/UL    ABS. EOSINOPHILS 0.3 0.0 - 0.4 K/UL    ABS. BASOPHILS 0.0 0.0 - 0.1 K/UL    ABS. IMM. GRANS. 0.0 0.00 - 0.04 K/UL    DF AUTOMATED     GLUCOSE, POC    Collection Time: 11/02/20  7:50 AM   Result Value Ref Range    Glucose (POC) 76 65 - 100 mg/dL    Performed by SAMEER JAMES      Imaging:             CXR Results  (Last 48 hours)                 10/28/20 2236   XR CHEST SNGL V Final result     Impression:   IMPRESSION: There is persistent airspace disease within the lung bases without   resolution when compared to prior imaging. Although these findings could   represent recurrent aspiration pneumonia other chronic airspace processes cannot   be fully excluded. 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   Narrative:   This study is a portable radiograph of the chest dated 10/28/2020. HISTORY: Fever aspiration most likely. COMPARISON: 9/10/2020. FINDINGS: There is normal size to the cardiac/pericardial silhouette. The   pulmonary vessels are normal in caliber. There is airspace disease within the mid to lower lung zones associated with an   elevation of the hemidiaphragms. There are similar airspace disease in prior   imaging although there is a greater component of airspace disease within the mid   lung zones and now the airspace process is limited to the lung bases. The costophrenic angles are maintained without pleural effusions. There is   mild-to-moderate ectasia of the thoracic aorta. Otherwise, the mediastinal   structures are normal. The bony thorax is intact.                      Results from Hospital Encounter encounter on 10/28/20   XR CHEST SNGL V     Narrative This study is a portable radiograph of the chest dated 10/28/2020.     HISTORY: Fever aspiration most likely.     COMPARISON: 9/10/2020.     FINDINGS: There is normal size to the cardiac/pericardial silhouette. The  pulmonary vessels are normal in caliber.     There is airspace disease within the mid to lower lung zones associated with an  elevation of the hemidiaphragms. There are similar airspace disease in prior  imaging although there is a greater component of airspace disease within the mid  lung zones and now the airspace process is limited to the lung bases.     The costophrenic angles are maintained without pleural effusions. There is  mild-to-moderate ectasia of the thoracic aorta. Otherwise, the mediastinal  structures are normal. The bony thorax is intact.        Impression IMPRESSION: There is persistent airspace disease within the lung bases without  resolution when compared to prior imaging.  Although these findings could  represent recurrent aspiration pneumonia other chronic airspace processes cannot  be fully excluded. Cryptogenic organized pneumonia can present with chronic  peribronchial and perivascular parenchymal opacification.      No results found for this or any previous visit.        IMPRESSION:   1. Acute hypoxic respiratory failure  resolved  2. Aspiration pneumonia   3. HIV positive  4. History of hepatitis C  5. Pt is requiring Drug therapy requiring intensive monitoring for toxicity  6. Prognosis guarded        RECOMMENDATIONS/PLAN:      1. Patient is on room air  2. Discharging today  3. Agree with Empiric IV antibiotics pending culture results   4. Follow culture results. CXR 10/31 shows mild decreased airspace disease at the base of the right lung and no significant change of the interstitial and airspace disease in the mid and basilar left lung  5.  Continue antiretroviral therapy for HIV  6. COVID-19 negative          Shelia Carver MD

## 2020-11-02 NOTE — PROGRESS NOTES
Patient discharged, A&Ox1, patient on room air, no distress noted, gave report, and discharge instructions to [facility] @ [phone] via telephone call. She verbalized understanding. Discharge plan of care/case management plan validated with provider discharge order.

## 2020-11-02 NOTE — DISCHARGE SUMMARY
Hospitalist Discharge Summary     Patient ID:    Marea Phoenix  [id number]  16 y.o.  1950    Admit date: 10/28/2020    Discharge date : 11/2/2020    Chronic Diagnoses:    Problem List as of 11/2/2020 Date Reviewed: 10/29/2020          Codes Class Noted - Resolved    Pneumonia ICD-10-CM: J18.9  ICD-9-CM: 400  10/29/2020 - Present        SOB (shortness of breath) ICD-10-CM: R06.02  ICD-9-CM: 786.05  9/12/2020 - Present          22    Final Diagnoses: Active Problems:    Pneumonia (10/29/2020)        Reason for Hospitalization:  81 y.o. male nonverbal noncommunicative with chronically bedridden status, HIV, recent [phone] positive presents to the emergency room from [facility] by emergency crew as they found him vomiting coffee-ground emesis. Suspected of aspiration pneumonia. This patient was recently hospitalized for more than a month due to Klebsiella pneumonia pneumonia that was treated with improvement. He presented with acute respiratory failure last admission. Now currently he is on nonrebreather mask saturating 100%, he just awake with eyes open but does not understand anything.     Chest x-ray suggestive of no improvement from pneumonia findings from previous x-ray. Hospital Course:     Pt admitted for aspiration pneumonia, started on IV antibiotics per ID, tested for covid 19 which is negative. Pt to continue with augmentin 867 mg bid for 10 more days. Pt to continue with current home medications, enteral nutrition, and water flushes per PEG tube. Pt is high risk for recurrent aspiration and should always have HOB at 35 degrees or above to help avoid reflux and aspiration. Pt should not take any oral food or medications by mouth, with frequent mouth care and oral or NT suctioning. Follow up with facility provider upon return to [facility].    Discharge Medications:   Current Discharge Medication List      START taking these medications    Details amoxicillin-clavulanate (AUGMENTIN) 875-125 mg per tablet 1 Tab by Per G Tube route every twelve (12) hours for 10 days. Indications: a common cold  Qty: 20 Tab, Refills: 0         CONTINUE these medications which have NOT CHANGED    Details   docusate (COLACE) 50 mg/5 mL liquid 100 mg by Per G Tube route two (2) times a day. senna (Senna) 8.6 mg tablet 1 Tab by Per G Tube route nightly. fentaNYL (DURAGESIC) 25 mcg/hr PATCH 1 Patch by TransDERmal route every seventy-two (72) hours. predniSONE (DELTASONE) 20 mg tablet Take 20 mg by mouth daily. acetaminophen (TYLENOL) 160 mg/5 mL liquid 15 mg/kg by Per G Tube route every six (6) hours as needed for Fever. thiamine mononitrate (B-1) 100 mg tablet Take 0.5 Tabs by mouth daily. Qty: 30 Tab, Refills: 0      albuterol (PROVENTIL VENTOLIN) 2.5 mg /3 mL (0.083 %) nebu 2.5 mg by Nebulization route every two (2) hours as needed for Wheezing or Shortness of Breath. cyanocobalamin 1,000 mcg tablet Take 1,000 mcg by mouth daily. emtricitabine-tenofovir, TDF, (TRUVADA) 200-300 mg per tablet Take 1 Tab by mouth daily. lansoprazole (PREVACID) 30 mg capsule 30 mg by Per G Tube route Daily (before breakfast). raltegravir (ISENTRESS) 400 mg tablet Take 400 mg by mouth two (2) times a day. thiamine (B-1) 50 mg tablet Take 50 mg by mouth daily. ZIPRASIDONE HCL PO 40 mg by Per G Tube route daily. Follow up Care:    1. Mark Alves MD in 1-2 weeks. Follow-up Information     Follow up With Specialties Details Why Contact Info    Mark Alves MD [facility]   [address] [place] [address]  [phone]              Patient Follow Up Instructions:    Activity: Activity as tolerated  Diet:  PEG feeding at facility orders    Condition at Discharge:  Stable  __________________________________________________________________    Disposition  Long Term Care  ____________________________________________________________________    Code Status:  Partial Code  ___________________________________________________________________    Discharge Exam:  Patient seen and examined by me on discharge day. Physical Exam   Constitutional: He appears well-developed. No distress. Cardiovascular: Normal rate, regular rhythm and intact distal pulses.   Murmur heard. Pulmonary/Chest:. He has scattered upper airway coarse sounds, diminished in bases. Abdominal: Soft. Bowel sounds are normal.   PEG tube intact , clamped  Genitourinary:    Penis normal.      Genitourinary Comments:condom  catheter patent, clear yellow urine    Musculoskeletal:         General: Deformity present.      Comments: Rigid,  Semi contracted with arms, legs  Neurological: He is alert.   Non verbal   Skin: Skin is warm and dry.   CONSULTATIONS: ID    Significant Diagnostic Studies:   Recent Results (from the past 24 hour(s))   METABOLIC PANEL, BASIC    Collection Time: 11/01/20 11:47 AM   Result Value Ref Range    Sodium 151 (H) 136 - 145 mmol/L    Potassium 3.4 (L) 3.5 - 5.1 mmol/L    Chloride 120 (H) 97 - 108 mmol/L    CO2 26 21 - 32 mmol/L    Anion gap 5 5 - 15 mmol/L    Glucose 86 65 - 100 mg/dL    BUN 18 6 - 20 mg/dL    Creatinine 1.19 0.70 - 1.30 mg/dL    BUN/Creatinine ratio 15 12 - 20      GFR est AA >60 >60 ml/min/1.73m2    GFR est non-AA >60 >60 ml/min/1.73m2    Calcium 9.0 8.5 - 10.1 mg/dL   CBC WITH AUTOMATED DIFF    Collection Time: 11/01/20 11:47 AM   Result Value Ref Range    WBC 8.5 4.1 - 11.1 K/uL    RBC 4.23 4. 10 - 5.70 M/uL    HGB 13.5 12.1 - 17.0 g/dL    HCT 40.3 36.6 - 50.3 %    MCV 95.3 80.0 - 99.0 FL    MCH 31.9 26.0 - 34.0 PG    MCHC 33.5 30.0 - 36.5 g/dL    RDW 12.9 11.5 - 14.5 %    PLATELET 702 212 - 530 K/uL    MPV 10.5 8.9 - 12.9 FL    NEUTROPHILS 72 32 - 75 %    LYMPHOCYTES 20 12 - 49 %    MONOCYTES 5 5 - 13 %    EOSINOPHILS 3 0 - 7 %    BASOPHILS 0 0 - 1 %    IMMATURE GRANULOCYTES 0 0.0 - 0.5 %    ABS. NEUTROPHILS 6.1 1.8 - 8.0 K/UL    ABS. LYMPHOCYTES 1.7 0.8 - 3.5 K/UL    ABS. MONOCYTES 0.5 0.0 - 1.0 K/UL    ABS. EOSINOPHILS 0.3 0.0 - 0.4 K/UL    ABS. BASOPHILS 0.0 0.0 - 0.1 K/UL    ABS. IMM. GRANS. 0.0 0.00 - 0.04 K/UL    DF AUTOMATED     GLUCOSE, POC    Collection Time: 11/02/20  7:50 AM   Result Value Ref Range    Glucose (POC) 76 65 - 100 mg/dL    Performed by brotips      XR CHEST PORT   Final Result   Impression:      Mild decreased airspace disease at the base of the right lung. No significant   change of the interstitial and airspace disease in the mid and basilar left   lung. XR CHEST SNGL V   Final Result   IMPRESSION: There is persistent airspace disease within the lung bases without   resolution when compared to prior imaging. Although these findings could   represent recurrent aspiration pneumonia other chronic airspace processes cannot   be fully excluded. 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 Discharge: time spent 35  minutes in discharge  Education and counseling, spoke with Dr Geronimo Riggs per phone to update on   Treatment and current status.      Signed:  Simeon Magaña NP  11/2/2020  9:43 AM

## 2020-11-02 NOTE — PROGRESS NOTES
Cm called and spoke with charge nurse at [facility]. Patient will be going on the 3rd floor, room number to be determined. Nurse to call report to [phone]. CM attempted to call [address] [phone], with no answer to telephone. CM left detailed message informing her of current plan. Attending gave report to Dr. Bartolo Cote at [facility]. Discharge plan of care/case management plan validated with provider discharge order. Primary nurse and CM completed DC checklist.  We will set up transportation to [facility].

## 2020-11-02 NOTE — PROGRESS NOTES
Pulmonary Progress Note               Daily Progress Note: 11/2/2020      Subjective: The patient is seen for follow  up. Excerpts from admission 10/28/2020 or consult notes as follows:   81year old male from [address] [place] [address] came in because of fever and vomiting he is nonverbal bedridden HIV-positive unable to get any history out of the patient he was admitted to the hospital previously because of pneumonia which was Klebsiella he was put on 100% nonrebreather mask in the emergency room which has been changed to [address] open eyes but does not follow any command so admitted and pulmonary consult was called to further evaluation.     Currently on room air. Discharging to [facility] today. COVID-19 Negative. Chest Xray 10/31 showed mild decreased airspace disease at the base of the right lung. No significant  change of the interstitial and airspace disease in the mid and basilar left lung. Fecal occult blood was positive 10/28. Hb 10/28 14.6. Gastroenterology is monitoring, no need for endoscopy at this time.     Problem List:  Problem List as of 11/2/2020 Date Reviewed: 10/29/2020          Codes Class Noted - Resolved    Pneumonia ICD-10-CM: J18.9  ICD-9-CM: 645  10/29/2020 - Present        SOB (shortness of breath) ICD-10-CM: R06.02  ICD-9-CM: 786.05  9/12/2020 - Present              Medications reviewed  Current Facility-Administered Medications   Medication Dose Route Frequency    amoxicillin-clavulanate (AUGMENTIN) 875-125 mg per tablet 1 Tab  1 Tab Per G Tube Q12H    potassium chloride (KLOR-CON) packet for solution 20 mEq  20 mEq PEG Tube DAILY    albuterol CONCENTRATE 2.5mg/0.5 mL neb soln  2.5 mg Nebulization Q6H PRN    cyanocobalamin tablet 1,000 mcg  1,000 mcg Oral DAILY    docusate (COLACE) 50 mg/5 mL oral liquid 100 mg  100 mg Per G Tube BID    emtricitabine-tenofovir (TDF) (TRUVADA) 200-300 mg per tablet 1 Tab  1 Tab Oral DAILY    fentaNYL (DURAGESIC) 25 mcg/hr patch 1 Patch  1 Patch TransDERmal Q72H    pantoprazole (PROTONIX) granules for oral suspension 40 mg  40 mg Per G Tube ACB    raltegravir (ISENTRESS) tablet 400 mg  400 mg Oral BID    senna (SENOKOT) tablet 8.6 mg  1 Tab Per G Tube QHS    thiamine mononitrate (B-1) tablet 100 mg  100 mg Per G Tube DAILY    ziprasidone (GEODON) capsule 40 mg  40 mg Oral DAILY    sodium chloride (NS) flush 5-40 mL  5-40 mL IntraVENous Q8H    sodium chloride (NS) flush 5-40 mL  5-40 mL IntraVENous PRN    acetaminophen (TYLENOL) tablet 650 mg  650 mg Per J Tube Q4H PRN    predniSONE (DELTASONE) tablet 20 mg  20 mg Oral DAILY    acetaminophen (TYLENOL) suppository 650 mg  650 mg Rectal Q4H PRN    ondansetron (ZOFRAN) injection 4 mg  4 mg IntraVENous Q6H PRN       Review of Systems:   Unable to obtain    Objective:   Physical Exam:     Visit Vitals  /76   Pulse 79   Temp 98.2 °F (36.8 °C)   Resp 20   Ht 6' 0.01\" (1.829 m)   Wt 175 lb (79.4 kg)   SpO2 96%   BMI 23.73 kg/m²    O2 Flow Rate (L/min): 6 l/min O2 Device: Room air    Temp (24hrs), Av.1 °F (36.7 °C), Min:97.9 °F (36.6 °C), Max:98.3 °F (36.8 °C)    No intake/output data recorded. 10/31 1901 -  0700  In: 1570 [P.O.:400]  Out: 775 [Urine:775]    Constitutional: He appears distressed. Nonverbal   HENT:   Head: Normocephalic and atraumatic. Neck: Normal range of motion. Cardiovascular: Normal rate and normal heart sounds. Pulmonary/Chest: He is in respiratory distress. He has rales. Abdominal: Bowel sounds are normal.   Musculoskeletal: Normal range of motion. Neurological:   Nonverbal       Data Review:       Recent Days:  Recent Labs     20  1147 10/31/20  1135   WBC 8.5 7.4   HGB 13.5 13.2   HCT 40.3 39.8    142*     Recent Labs     20  1147   *   K 3.4*   *   CO2 26   GLU 86   BUN 18   CREA 1.19   CA 9.0     No results for input(s): PH, PCO2, PO2, HCO3, FIO2 in the last 72 hours.     24 Hour Results:  Recent Results (from the past 24 hour(s))   METABOLIC PANEL, BASIC    Collection Time: 11/01/20 11:47 AM   Result Value Ref Range    Sodium 151 (H) 136 - 145 mmol/L    Potassium 3.4 (L) 3.5 - 5.1 mmol/L    Chloride 120 (H) 97 - 108 mmol/L    CO2 26 21 - 32 mmol/L    Anion gap 5 5 - 15 mmol/L    Glucose 86 65 - 100 mg/dL    BUN 18 6 - 20 mg/dL    Creatinine 1.19 0.70 - 1.30 mg/dL    BUN/Creatinine ratio 15 12 - 20      GFR est AA >60 >60 ml/min/1.73m2    GFR est non-AA >60 >60 ml/min/1.73m2    Calcium 9.0 8.5 - 10.1 mg/dL   CBC WITH AUTOMATED DIFF    Collection Time: 11/01/20 11:47 AM   Result Value Ref Range    WBC 8.5 4.1 - 11.1 K/uL    RBC 4.23 4. 10 - 5.70 M/uL    HGB 13.5 12.1 - 17.0 g/dL    HCT 40.3 36.6 - 50.3 %    MCV 95.3 80.0 - 99.0 FL    MCH 31.9 26.0 - 34.0 PG    MCHC 33.5 30.0 - 36.5 g/dL    RDW 12.9 11.5 - 14.5 %    PLATELET 464 098 - 698 K/uL    MPV 10.5 8.9 - 12.9 FL    NEUTROPHILS 72 32 - 75 %    LYMPHOCYTES 20 12 - 49 %    MONOCYTES 5 5 - 13 %    EOSINOPHILS 3 0 - 7 %    BASOPHILS 0 0 - 1 %    IMMATURE GRANULOCYTES 0 0.0 - 0.5 %    ABS. NEUTROPHILS 6.1 1.8 - 8.0 K/UL    ABS. LYMPHOCYTES 1.7 0.8 - 3.5 K/UL    ABS. MONOCYTES 0.5 0.0 - 1.0 K/UL    ABS. EOSINOPHILS 0.3 0.0 - 0.4 K/UL    ABS. BASOPHILS 0.0 0.0 - 0.1 K/UL    ABS. IMM. GRANS. 0.0 0.00 - 0.04 K/UL    DF AUTOMATED     GLUCOSE, POC    Collection Time: 11/02/20  7:50 AM   Result Value Ref Range    Glucose (POC) 76 65 - 100 mg/dL    Performed by SAMEER JAMES      Imaging:             CXR Results  (Last 48 hours)                 10/28/20 2936   XR CHEST SNGL V Final result     Impression:   IMPRESSION: There is persistent airspace disease within the lung bases without   resolution when compared to prior imaging. Although these findings could   represent recurrent aspiration pneumonia other chronic airspace processes cannot   be fully excluded. 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   Narrative:   This study is a portable radiograph of the chest dated 10/28/2020. HISTORY: Fever aspiration most likely. COMPARISON: 9/10/2020. FINDINGS: There is normal size to the cardiac/pericardial silhouette. The   pulmonary vessels are normal in caliber. There is airspace disease within the mid to lower lung zones associated with an   elevation of the hemidiaphragms. There are similar airspace disease in prior   imaging although there is a greater component of airspace disease within the mid   lung zones and now the airspace process is limited to the lung bases. The costophrenic angles are maintained without pleural effusions. There is   mild-to-moderate ectasia of the thoracic aorta. Otherwise, the mediastinal   structures are normal. The bony thorax is intact.                      Results from Hospital Encounter encounter on 10/28/20   XR CHEST SNGL V     Narrative This study is a portable radiograph of the chest dated 10/28/2020.     HISTORY: Fever aspiration most likely.     COMPARISON: 9/10/2020.     FINDINGS: There is normal size to the cardiac/pericardial silhouette. The  pulmonary vessels are normal in caliber.     There is airspace disease within the mid to lower lung zones associated with an  elevation of the hemidiaphragms. There are similar airspace disease in prior  imaging although there is a greater component of airspace disease within the mid  lung zones and now the airspace process is limited to the lung bases.     The costophrenic angles are maintained without pleural effusions. There is  mild-to-moderate ectasia of the thoracic aorta. Otherwise, the mediastinal  structures are normal. The bony thorax is intact.        Impression IMPRESSION: There is persistent airspace disease within the lung bases without  resolution when compared to prior imaging.  Although these findings could  represent recurrent aspiration pneumonia other chronic airspace processes cannot  be fully excluded. Cryptogenic organized pneumonia can present with chronic  peribronchial and perivascular parenchymal opacification.      No results found for this or any previous visit.        IMPRESSION:   1. Acute hypoxic respiratory failure  resolved  2. Aspiration pneumonia   3. HIV positive  4. History of hepatitis C  5. Pt is requiring Drug therapy requiring intensive monitoring for toxicity  6. Prognosis guarded        RECOMMENDATIONS/PLAN:      1. Patient is on room air  2. Discharging today  3. Agree with Empiric IV antibiotics pending culture results   4. Follow culture results. CXR 10/31 shows mild decreased airspace disease at the base of the right lung and no significant change of the interstitial and airspace disease in the mid and basilar left lung  5.  Continue antiretroviral therapy for HIV  6. COVID-19 negative          Warden Camara

## 2020-11-04 LAB
BACTERIA SPEC CULT: NORMAL
SPECIAL REQUESTS,SREQ: NORMAL

## 2020-11-12 NOTE — PROGRESS NOTES
Discharge Summary       PATIENT ID: Omer Hannah  MRN: [id number]   YOB: 1950    DATE OF ADMISSION: 8/29/2020  6:25 AM    DATE OF DISCHARGE:   PRIMARY CARE PROVIDER: Bryan Alvarez MD     ATTENDING PHYSICIAN: Dorina Medrano  DISCHARGING PROVIDER: Abel Villasenor      CONSULTATIONS: None    PROCEDURES/SURGERIES: * No surgery found *    ADMITTING DIAGNOSES:    Patient Active Problem List    Diagnosis Date Noted    SOB (shortness of breath) 09/12/2020       DISCHARGE DIAGNOSES / PLAN:    Acute hypoxemic respiratory failure onRA   Pneumonia with Klebsiella pneumonia Proteus mirabilis and MRSA on IV vancomycin and Zosyn  COVID-19 pneumonitis  COVID-19 not due to HIV  Severe dehydration  HIV disease  Hepatitis B  History of neurosyphilis  History of transitional cell bladder cancer  Chronic pain syndrome  Thrombocytopenia  Neutropenia  Hyponatremia  No sepsis       ADDITIONAL CARE RECOMMENDATIONS:         DISCHARGE MEDICATIONS:  Current Discharge Medication List      START taking these medications    Details   !! ziprasidone (GEODON) 40 mg capsule 1 Cap by Per G Tube route two (2) times daily (with meals). Qty: 30 Cap, Refills: 0      predniSONE (DELTASONE) 20 mg tablet 20 mg by Per G Tube route daily. Indications: polyarteritis nodosa  Qty: 30 Tab, Refills: 0      thiamine mononitrate (B-1) 100 mg tablet Take 0.5 Tabs by mouth daily. Qty: 30 Tab, Refills: 0      doxycycline (MONODOX) 100 mg capsule Take 1 Cap by mouth two (2) times a day. Qty: 20 Cap, Refills: 0       !! - Potential duplicate medications found. Please discuss with provider. CONTINUE these medications which have NOT CHANGED    Details   albuterol (PROVENTIL VENTOLIN) 2.5 mg /3 mL (0.083 %) nebu 2.5 mg by Nebulization route every two (2) hours as needed for Wheezing or Shortness of Breath. cyanocobalamin 1,000 mcg tablet Take 1,000 mcg by mouth daily.       emtricitabine-tenofovir, TDF, (TRUVADA) 200-300 mg per tablet Take 1 Please inform patient that the echocardiogram showed normal heart function. 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        Tab by mouth daily. lansoprazole (PREVACID) 30 mg capsule 30 mg by Per G Tube route Daily (before breakfast). raltegravir (ISENTRESS) 400 mg tablet Take 400 mg by mouth two (2) times a day. thiamine (B-1) 50 mg tablet Take 50 mg by mouth daily. !! ziprasidone (GEODON) 40 mg capsule 40 mg by Per G Tube route two (2) times daily (with meals). !! - Potential duplicate medications found. Please discuss with provider. STOP taking these medications       acetaminophen (TYLENOL) 325 mg tablet Comments:   Reason for Stopping:         albuterol sulfate (PROVENTIL;VENTOLIN) 2.5 mg/0.5 mL nebu nebulizer solution Comments:   Reason for Stopping:         fentaNYL (DURAGESIC) 25 mcg/hr PATCH Comments:   Reason for Stopping:                 NOTIFY YOUR PHYSICIAN FOR ANY OF THE FOLLOWING:   Fever over 101 degrees for 24 hours. Chest pain, shortness of breath, fever, chills, nausea, vomiting, diarrhea, change in mentation, falling, weakness, bleeding. Severe pain or pain not relieved by medications. Or, any other signs or symptoms that you may have questions about. DISPOSITION:  x  Home With:   OT  PT  HH  RN       Long term SNF/Inpatient Rehab    Independent/assisted living    Hospice    Other:       PATIENT CONDITION AT DISCHARGE: Stable      PHYSICAL EXAMINATION AT DISCHARGE:  General:          Alert, cooperative, no distress, appears stated age. HEENT:           Atraumatic, anicteric sclerae, pink conjunctivae                          No oral ulcers, mucosa moist, throat clear, dentition fair  Neck:               Supple, symmetrical  Lungs:             Clear to auscultation bilaterally. No Wheezing or Rhonchi. No rales. Chest wall:      No tenderness  No Accessory muscle use. Heart:              Regular  rhythm,  No  murmur   No edema  Abdomen:        Soft, non-tender. Not distended. Bowel sounds normal  Extremities:     No cyanosis.   No clubbing,                            Skin turgor normal, Capillary refill normal  Skin:                Not pale. Not Jaundiced  No rashes   Psych:             Not anxious or agitated. Neurologic:      Alert, moves all extremities, answers questions appropriately and responds to commands     No orders to display        No results found for this or any previous visit (from the past 24 hour(s)).        HOSPITAL COURSE:  To present illness please refer to history and physical at the time of the admission as the patient was admitted because of acute hypoxemic respiratory failure pneumonia with Klebsiella pneumonia and MRSA patient was IV treated with IV vancomycin and Zosyn COVID-19 was positive seen by the pulmonologist and also seen by the nephrologist patient was hypoxemic for a while now patient was weaned off on room air saturation above 90% patient bedbound nonverbal active failure patient received course of IV antibiotic will discharge back to the nursing home on p.o. doxycycline      Patient resting the bed not any distress waiting for placement    Signed:   Zion Metz MD  9/26/2020  1:09 PM

## 2020-12-09 NOTE — PROGRESS NOTES
Discharge Summary       PATIENT ID: Emiliano Opitz  MRN: [id number]   YOB: 1950    DATE OF ADMISSION: 8/29/2020  6:25 AM    DATE OF DISCHARGE:   PRIMARY CARE PROVIDER: Adelita Wright MD     ATTENDING PHYSICIAN: Haiely Rabago  DISCHARGING PROVIDER: Rani Villasenor      CONSULTATIONS: None    PROCEDURES/SURGERIES: * No surgery found *    ADMITTING DIAGNOSES:    Patient Active Problem List    Diagnosis Date Noted    SOB (shortness of breath) 09/12/2020       DISCHARGE DIAGNOSES / PLAN:    Acute hypoxemic respiratory failure on 4 L  Pneumonia with Klebsiella pneumonia Proteus mirabilis and MRSA on IV vancomycin and Zosyn  COVID-19 pneumonitis  COVID-19 not due to HIV  Severe dehydration  HIV disease  Hepatitis B  History of neurosyphilis  History of transitional cell bladder cancer  Chronic pain syndrome  Thrombocytopenia  Neutropenia  Hyponatremia  No sepsis     Active Hospital Problems    Diagnosis Date Noted    SOB (shortness of breath) 09/12/2020        ADDITIONAL CARE RECOMMENDATIONS:         DISCHARGE MEDICATIONS:  Current Discharge Medication List      START taking these medications    Details   !! ziprasidone (GEODON) 40 mg capsule 1 Cap by Per G Tube route two (2) times daily (with meals). Qty: 30 Cap, Refills: 0      predniSONE (DELTASONE) 20 mg tablet 20 mg by Per G Tube route daily. Indications: polyarteritis nodosa  Qty: 30 Tab, Refills: 0      thiamine mononitrate (B-1) 100 mg tablet Take 0.5 Tabs by mouth daily. Qty: 30 Tab, Refills: 0      doxycycline (MONODOX) 100 mg capsule Take 1 Cap by mouth two (2) times a day. Qty: 20 Cap, Refills: 0       !! - Potential duplicate medications found. Please discuss with provider. CONTINUE these medications which have NOT CHANGED    Details   albuterol (PROVENTIL VENTOLIN) 2.5 mg /3 mL (0.083 %) nebu 2.5 mg by Nebulization route every two (2) hours as needed for Wheezing or Shortness of Breath.       cyanocobalamin 1,000 mcg tablet Take 1,000 [facility] [address] [place] [address] DERMATOLOGY  [address] [place] [id number]       Office Progress Note     Chief Complaint   Patient presents with    Post-Op Check     pt states that she is doing well and the site is healing very well. HPI:   Melo Sherman is a 63 y.o. female who presents status post excision of right axillary lesion on 10/28 2020. 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 Patient has some spitting of the internal sutures.     Medications:     Current Outpatient Medications   Medication Sig Dispense Refill    omeprazole (PRILOSEC) 40 MG delayed release capsule TAKE 1 CAPSULE BY MOUTH DAILY EVERY MORNING FOR BREAKFAST 90 capsule 2    diclofenac (VOLTAREN) 50 MG EC tablet TAKE 1 TABLET BY MOUTH TWICE DAILY 56 tablet 2    busPIRone (BUSPAR) 15 MG tablet TAKE ONE TABLET BY MOUTH TWICE DAILY 56 tablet 3    pravastatin (PRAVACHOL) 40 MG tablet TAKE ONE TABLET BY MOUTH ONCE DAILY 28 tablet 3    ibuprofen (ADVIL;MOTRIN) 800 MG tablet Take 1 tablet by mouth every 8 hours as needed for Pain 90 tablet 2    miSOPROStol (CYTOTEC) 200 MCG tablet Take 200 mcg by mouth daily      vitamin D (ERGOCALCIFEROL) 1.25 MG (37176 UT) CAPS capsule TAKE 1 CAPSULE BY MOUTH EVERY WEEK ON THURSDAYS 4 capsule 3    fluticasone (FLONASE) 50 MCG/ACT nasal spray INSTILL 2 SPRAYS INTO EACH NOSTRIL ONCE DAILY 16 g 3    chlorzoxazone (PARAFON FORTE) 500 MG tablet Take 0.5 tablets by mouth 2 times daily 60 tablet 3    senna-docusate (PERICOLACE) 8.6-50 MG per tablet Take 1 tablet by mouth daily as needed for Constipation 30 tablet 1    Multiple Vitamins-Minerals (CULTURELLE PROBIOTICS + MULTIV) CHEW Take 1 tablet by mouth daily 30 tablet 2    ondansetron (ZOFRAN ODT) 4 MG disintegrating tablet Take 1 tablet by mouth every 6 hours as needed for Nausea or Vomiting 12 tablet 0    albuterol sulfate  (90 Base) MCG/ACT inhaler INHALE 2 PUFFS BY MOUTH INTO THE LUNGS ONCE EVERY 6 HOURS AS NEEDED FOR WHEEZING 18 g 3    MI-ACID GAS RELIEF 80 MG chewable tablet CHEW 1 TABLET BY MOUTH FOUR TIMES DAILY AS NEEDED FOR FLATULENCE 120 tablet 5    CRANBERRY CONCENTRATE 500 MG CAPS TAKE 1 CAPSULE BY MOUTH TWICE DAILY 56 capsule 3    ketotifen (ZADITOR) 0.025 % ophthalmic solution Place 1 drop into both eyes 2 times daily 10 mL 1    metoprolol succinate (TOPROL XL) 25 MG extended release tablet Take 25 mg by mouth daily       PREMARIN 0.625 MG/GM vaginal cream PLACE 2 GRAMS VAGINALLY TWICE A WEEK FOR 12 DOSES 30 g 1    venlafaxine (EFFEXOR XR) 75 MG extended release capsule TAKE 1 CAPSULE BY MOUTH DAILY 28 capsule 3     No current facility-administered medications for this visit. Allergies: Allergies   Allergen Reactions    Shellfish Allergy     Shrimp (Diagnostic) Shortness Of Breath    Seasonal     Famotidine Other (See Comments)     Headaches  Headaches  Headaches  Headaches  Headaches    Gabapentin Nausea Only     Mood swings, nausea. Mood swings, nausea. Mood swings, nausea. Mood swings, nausea. Mood swings, nausea. Past Medical History:   Diagnosis Date    ASCUS with positive high risk HPV cervical 3/22/16    Asthma     Depression     Diverticulitis     ESBL (extended spectrum beta-lactamase) producing bacteria infection 02/03/2019    E.  Coli urine    GERD (gastroesophageal reflux disease)     Hyperlipidemia     MRSA (methicillin resistant staph aureus) culture positive 4/18/2016    lip    Rectocele     Tachycardia     takes Metoprolol     Past Surgical History:   Procedure Laterality Date    COLONOSCOPY N/A 7/19/2018    COLONOSCOPY performed by Darinel Das DO at [facility]  2/25/2015    uro   Karen 80    RYAN, BSO performed at [facility] by Dr. Gary Hardy, Also had some type of bladder lift at this time    KNEE ARTHROSCOPY Left 5/13/2019    KNEE ARTHROSCOPY performed by Sonny Henson MD at [address] [place] [address] Left     #1 - mcg by mouth daily. emtricitabine-tenofovir, TDF, (TRUVADA) 200-300 mg per tablet Take 1 Tab by mouth daily. lansoprazole (PREVACID) 30 mg capsule 30 mg by Per G Tube route Daily (before breakfast). raltegravir (ISENTRESS) 400 mg tablet Take 400 mg by mouth two (2) times a day. thiamine (B-1) 50 mg tablet Take 50 mg by mouth daily. !! ziprasidone (GEODON) 40 mg capsule 40 mg by Per G Tube route two (2) times daily (with meals). !! - Potential duplicate medications found. Please discuss with provider. STOP taking these medications       acetaminophen (TYLENOL) 325 mg tablet Comments:   Reason for Stopping:         albuterol sulfate (PROVENTIL;VENTOLIN) 2.5 mg/0.5 mL nebu nebulizer solution Comments:   Reason for Stopping:         fentaNYL (DURAGESIC) 25 mcg/hr PATCH Comments:   Reason for Stopping:                 NOTIFY YOUR PHYSICIAN FOR ANY OF THE FOLLOWING:   Fever over 101 degrees for 24 hours. Chest pain, shortness of breath, fever, chills, nausea, vomiting, diarrhea, change in mentation, falling, weakness, bleeding. Severe pain or pain not relieved by medications. Or, any other signs or symptoms that you may have questions about. DISPOSITION:  x  Home With:   OT  PT  HH  RN       Long term SNF/Inpatient Rehab    Independent/assisted living    Hospice    Other:       PATIENT CONDITION AT DISCHARGE: Stable      PHYSICAL EXAMINATION AT DISCHARGE:  General:          Alert, cooperative, no distress, appears stated age. HEENT:           Atraumatic, anicteric sclerae, pink conjunctivae                          No oral ulcers, mucosa moist, throat clear, dentition fair  Neck:               Supple, symmetrical  Lungs:             Clear to auscultation bilaterally. No Wheezing or Rhonchi. No rales. Chest wall:      No tenderness  No Accessory muscle use. Heart:              Regular  rhythm,  No  murmur   No edema  Abdomen:        Soft, non-tender. Not distended.   Bowel lateral release, #2 - arthroscopy with muscle alignment    NERVE BLOCK  2017    caudal #1  decadron 10mg    NERVE BLOCK  2017    cadal # 2, decadron 10 mg    NERVE BLOCK  2017    caudal epidural steroid block #2 decadron 10 mg    NERVE BLOCK  11/10/2017    lumbar L4-5 epidural; depomedrol 80mg    UPPER GASTROINTESTINAL ENDOSCOPY  2019    EGD BIOPSY performed by Leah Ramírez MD at [address] Marital status:      Spouse name: Not on file    Number of children: Not on file    Years of education: Not on file    Highest education level: Not on file   Occupational History    Not on file   Social Needs    Financial resource strain: Not on file    Food insecurity     Worry: Not on file     Inability: Not on file    Transportation needs     Medical: Not on file     Non-medical: Not on file   Tobacco Use    Smoking status: Former Smoker     Packs/day: 2.00     Years: 0.50     Pack years: 1.00     Types: Cigarettes     Last attempt to quit:      Years since quittin.9    Smokeless tobacco: Never Used   Substance and Sexual Activity    Alcohol use:  Yes     Alcohol/week: 0.0 standard drinks     Comment: social    Drug use: No    Sexual activity: Not on file   Lifestyle    Physical activity     Days per week: Not on file     Minutes per session: Not on file    Stress: Not on file   Relationships    Social connections     Talks on phone: Not on file     Gets together: Not on file     Attends Baptism service: Not on file     Active member of club or organization: Not on file     Attends meetings of clubs or organizations: Not on file     Relationship status: Not on file    Intimate partner violence     Fear of current or ex partner: Not on file     Emotionally abused: Not on file     Physically abused: Not on file     Forced sexual activity: Not on file   Other Topics Concern    Not on file   Social History Narrative    sounds normal  Extremities:     No cyanosis. No clubbing,                            Skin turgor normal, Capillary refill normal  Skin:                Not pale. Not Jaundiced  No rashes   Psych:             Not anxious or agitated. Neurologic:      Alert, moves all extremities, answers questions appropriately and responds to commands     No orders to display        No results found for this or any previous visit (from the past 24 hour(s)).        HOSPITAL COURSE:  To present illness please refer to history and physical at the time of the admission as the patient was admitted because of acute hypoxemic respiratory failure pneumonia with Klebsiella pneumonia and MRSA patient was IV treated with IV vancomycin and Zosyn COVID-19 was positive seen by the pulmonologist and also seen by the nephrologist patient was hypoxemic for a while now patient was weaned off on room air saturation above 90% patient bedbound nonverbal active failure patient received course of IV antibiotic will discharge back to the nursing home on p.o. doxycycline    Medication reconciliation done  Time to discharge patient 35 minutes    Signed:   Cindy Barber MD  9/20/2020  1:09 PM Not on file     Family History   Problem Relation Age of Onset    Colon Cancer Mother     High Blood Pressure Father     Heart Attack Brother     Heart Disease Brother     Crohn's Disease Niece      Review of Systems:      Constitutional: Negative for fever, chills, fatigue and unexpected weight change. HENT: Negative for hearing loss, sore throat and facial swelling. Eyes: Patient has a history of dry eyes. Respiratory: Patient has a history of asthma. .    Cardiovascular: Patient has a history of hyperlipidemia, chest pain and hyperglycemia. Patient has a history of tachycardia. Gastrointestinal: Patient has a history of diverticulitis. Patient has a history of GERD. Skin: Negative for pallor and rash. Neurological: Negative for seizures, syncope and numbness. Hematological: Does not bruise/bleed easily. Psychiatric/Behavioral: Negative for behavioral problems. Patient has a history of depression. Physical Exam:   /73 (Site: Left Upper Arm, Position: Sitting, Cuff Size: Large Adult)   Pulse 81   Temp 97.1 °F (36.2 °C) (Temporal)   Ht 5' 1\" (1.549 m)   Wt 174 lb (78.9 kg)   SpO2 100%   BMI 32.88 kg/m²    Body mass index is 32.88 kg/m². Physical Exam   Constitutional: Oriented to person, place, and time. Appears well-developed and well-nourished. No distress. HEENT: Normocephalic and atraumatic. External ears within normal limits. Extraocular muscles are intact. Conjunctivae were anicteric. Pulmonary/Chest: Effort normal. No respiratory distress. Neurological: Alert and oriented to person, place, and time. Skin patient has healed well. Patient has some internal sutures which are dissolvable that are poking through. I recommended for the patient to massage it with Vaseline or Eucerin. Extremities:  Moves all extremities. Abdomen:  Soft. Psychiatric: Normal mood and affect.  Behavior is normal.    Labs:   @LASTLABM(1m)@    Imaging:   Xr Ankle Left (min 3 Views)    Result Date: 11/13/2020  EXAMINATION: THREE XRAY VIEWS OF THE LEFT ANKLE 11/13/2020 4:56 pm COMPARISON: None. HISTORY: ORDERING SYSTEM PROVIDED HISTORY: pain over lateral malleolus TECHNOLOGIST PROVIDED HISTORY: pain over lateral malleolus Reason for Exam: pain over lateral malleolus Acuity: Acute Type of Exam: Initial Mechanism of Injury: pain over lateral malleolus FINDINGS: No fractures or dislocations. No suspicious focal bony lesions. No bony erosions or bony destructive changes. No degenerative changes noted. Ankle mortise relationships are maintained. There appears to be minimal soft tissue swelling about the lateral malleolus. No radiopaque foreign bodies or soft tissue gas. Small plantar calcaneal bone spur. There appears to be minimal soft tissue swelling about the lateral malleolus. No acute bony abnormality. Impression/Plan:      Diagnosis Orders   1. History of nevus excision           Plan:  Patient status post excision of a compound nevus. I discussed with patient that she needs to do self monthly skin check and yearly skin checks professionally. Patient demonstrated understanding. We also discussed sun protection.           Electronically signed by:  Alvarado Montalvo M.D.   12/9/2020

## 2020-12-23 NOTE — PROGRESS NOTES
Problem: Falls - Risk of  Goal: *Absence of Falls  Description: Document Elizabeth Maya Fall Risk and appropriate interventions in the flowsheet. Outcome: Progressing Towards Goal  Note: Fall Risk Interventions:       Mentation Interventions: Evaluate medications/consider consulting pharmacy, More frequent rounding         Elimination Interventions: Bed/chair exit alarm, Call light in reach              Problem: Patient Education: Go to Patient Education Activity  Goal: Patient/Family Education  Outcome: Progressing Towards Goal     Problem: Pressure Injury - Risk of  Goal: *Prevention of pressure injury  Description: Document Jw Scale and appropriate interventions in the flowsheet.   Outcome: Progressing Towards Goal  Note: Pressure Injury Interventions:  Sensory Interventions: Keep linens dry and wrinkle-free, Maintain/enhance activity level, Minimize linen layers         Activity Interventions: PT/OT evaluation, Pressure redistribution bed/mattress(bed type)    Mobility Interventions: HOB 30 degrees or less, Pressure redistribution bed/mattress (bed type)    Nutrition Interventions: Document food/fluid/supplement intake    Friction and Shear Interventions: Lift team/patient mobility team, Minimize layers                Problem: Patient Education: Go to Patient Education Activity  Goal: Patient/Family Education  Outcome: Progressing Towards Goal     Problem: Nutrition Deficit  Goal: *Optimize nutritional status  Outcome: Progressing Towards Goal PCP for Immediate Care

## 2022-03-18 PROBLEM — J18.9 PNEUMONIA: Status: ACTIVE | Noted: 2020-10-29

## 2022-03-19 PROBLEM — R06.02 SOB (SHORTNESS OF BREATH): Status: ACTIVE | Noted: 2020-09-12

## 2023-01-10 NOTE — PROGRESS NOTES
Pulmonary Progress Note    Subjective:   Daily Progress Note: 2020 1:58 PM    CC: From here arm diabetes with fever aspiration    HPI: 82-year-old male came in with shortness of breath, Ron Pemberton he was COVID positive also is HIV positive    Patient in distress on oxygen nasal cannula mouth open but does not communicate  He is removing his oxygen nasal cannula  Now he is on room air saturation about 90%  Review of Systems  Unable to obtain    Objective:     Visit Vitals  BP (!) 149/91   Pulse 85   Temp 97.5 °F (36.4 °C)   Resp 18   Ht 5' 10\" (1.778 m)   Wt 86.8 kg (191 lb 5.8 oz)   SpO2 94%   BMI 27.46 kg/m²    O2 Flow Rate (L/min): 4 l/min O2 Device: Nasal cannula    Temp (24hrs), Av.8 °F (36.6 °C), Min:97.3 °F (36.3 °C), Max:98.3 °F (36.8 °C)      No intake/output data recorded.  1901 -  0700  In: -   Out: 300 [Urine:300]    Visit Vitals  BP (!) 149/91   Pulse 85   Temp 97.5 °F (36.4 °C)   Resp 18   Ht 5' 10\" (1.778 m)   Wt 86.8 kg (191 lb 5.8 oz)   SpO2 94%   BMI 27.46 kg/m²     General:  Alert, cooperative, no distress, appears stated age. Head:  Normocephalic, without obvious abnormality, atraumatic. Eyes:  Conjunctivae/corneas clear. PERRL, EOMs intact. Fundi benign   Ears:  Normal TMs and external ear canals both ears. Nose: Nares normal. Septum midline. Mucosa normal. No drainage or sinus tenderness. Throat: Lips, mucosa, and tongue normal. Teeth and gums normal.   Neck: Supple, symmetrical, trachea midline, no adenopathy, thyroid: no enlargement/tenderness/nodules, no carotid bruit and no JVD. Back:   Symmetric, no curvature. ROM normal. No CVA tenderness. Lungs:   Clear to auscultation bilaterally. Chest wall:  No tenderness or deformity. Heart:  Regular rate and rhythm, S1, S2 normal, no murmur, click, rub or gallop. Abdomen:   Soft, non-tender. Bowel sounds normal. No masses,  No organomegaly. Genitalia:  Normal male without lesion, discharge or tenderness. Rectal:  Normal tone, normal prostate, no masses or tenderness  Guaiac negative stool. Extremities: Extremities normal, atraumatic, no cyanosis or edema. Pulses: 2+ and symmetric all extremities. Skin: Skin color, texture, turgor normal. No rashes or lesions   Lymph nodes: Cervical, supraclavicular, and axillary nodes normal.               Data Review    Recent Results (from the past 24 hour(s))   CBC WITH AUTOMATED DIFF    Collection Time: 09/15/20  3:46 PM   Result Value Ref Range    WBC 5.1 4.1 - 11.1 K/uL    RBC 4.23 4. 10 - 5.70 M/uL    HGB 13.1 12.1 - 17.0 %    HCT 39.5 36.6 - 50.3 %    MCV 93.4 80.0 - 99.0 FL    MCH 31.0 26.0 - 34.0 PG    MCHC 33.2 30.0 - 36.5 g/dL    RDW 15.1 (H) 11.5 - 14.5 %    PLATELET 96 (L) 356 - 400 K/uL    MPV 11.7 8.9 - 12.9 FL    NEUTROPHILS 61 32 - 75 %    LYMPHOCYTES 29 12 - 49 %    MONOCYTES 8 5 - 13 %    EOSINOPHILS 2 0 - 7 %    BASOPHILS 0 0 - 1 %    IMMATURE GRANULOCYTES 0 0.0 - 0.5 %    ABS. NEUTROPHILS 3.1 1.8 - 8.0 K/UL    ABS. LYMPHOCYTES 1.5 0.8 - 3.5 K/UL    ABS. MONOCYTES 0.4 0.0 - 1.0 K/UL    ABS. EOSINOPHILS 0.1 0.0 - 0.4 K/UL    ABS. BASOPHILS 0.0 0.0 - 0.1 K/UL    ABS. IMM. GRANS. 0.0 0.00 - 0.04 K/UL    DF AUTOMATED     METABOLIC PANEL, COMPREHENSIVE    Collection Time: 09/15/20  3:46 PM   Result Value Ref Range    Sodium 145 136 - 145 mmol/L    Potassium 3.5 3.5 - 5.1 mmol/L    Chloride 109 (H) 97 - 108 mmol/L    CO2 27 21 - 32 mmol/L    Anion gap 9 5 - 15 mmol/L    Glucose 127 (H) 65 - 100 mg/dL    BUN 13 6 - 20 mg/dL    Creatinine 0.81 0.70 - 1.30 mg/dL    BUN/Creatinine ratio 16 12 - 20      GFR est AA >60 >60 ml/min/1.73m2    GFR est non-AA >60 >60 ml/min/1.73m2    Calcium 8.6 8.5 - 10.1 mg/dL    Bilirubin, total 0.5 0.2 - 1.0 mg/dL    AST (SGOT) 24 15 - 37 U/L    ALT (SGPT) 32 12 - 78 U/L    Alk.  phosphatase 85 45 - 117 U/L    Protein, total 6.9 6.4 - 8.2 g/dL    Albumin 3.1 (L) 3.5 - 5.0 g/dL    Globulin 3.8 2.0 - 4.0 g/dL    A-G Ratio 0.8 (L) 1.1 - 2. 2     VANCOMYCIN, TROUGH    Collection Time: 09/15/20  3:46 PM   Result Value Ref Range    Vancomycin,trough 9.7 5.0 - 10.0 ug/mL       Current Facility-Administered Medications   Medication Dose Route Frequency    vancomycin (VANCOCIN) 1,000 mg in 0.9% sodium chloride 250 mL (VIAL-MATE)  1,000 mg IntraVENous Q12H    cyanocobalamin tablet 1,000 mcg  1,000 mcg Oral DAILY    emtricitabine-tenofovir (TDF) (TRUVADA) 200-300 mg per tablet 1 Tab  1 Tab Oral DAILY    enoxaparin (LOVENOX) injection 40 mg  40 mg SubCUTAneous Q24H    pantoprazole (PROTONIX) tablet 40 mg  40 mg Oral ACB    piperacillin-tazobactam (ZOSYN) 3.375 g in 0.9% sodium chloride (MBP/ADV) 100 mL MBP  3.375 g IntraVENous Q8H    predniSONE (DELTASONE) tablet 20 mg  20 mg Per G Tube DAILY    raltegravir (ISENTRESS) tablet 400 mg  400 mg Oral BID    VANCOMYCIN INFORMATION NOTE   Other Rx Dosing/Monitoring    thiamine mononitrate (B-1) tablet 50 mg  50 mg Oral DAILY    ziprasidone (GEODON) capsule 40 mg  40 mg Per G Tube BID WITH MEALS    acetaminophen (TYLENOL) suppository 650 mg  650 mg Rectal Q4H PRN    acetaminophen (TYLENOL) tablet 650 mg  650 mg Oral Q6H PRN         Assessment/Plan:     COVID-19 pneumonia  Acute  hypoxic respiratory failure  HIV positive  History of transitional cell bladder cancer  Klebsiella pneumonia  Continue to wean oxygen per protocol he is right now he is on room air saturation is about 90%   patient was treated with Zosyn Lovenox prednisone Zithromax and  Actemra Handoff

## 2023-05-25 RX ORDER — SENNOSIDES A AND B 8.6 MG/1
1 TABLET, FILM COATED ORAL
COMMUNITY

## 2023-05-25 RX ORDER — LANOLIN ALCOHOL/MO/W.PET/CERES
50 CREAM (GRAM) TOPICAL DAILY
COMMUNITY
Start: 2020-09-18

## 2023-05-25 RX ORDER — EMTRICITABINE AND TENOFOVIR DISOPROXIL FUMARATE 200; 300 MG/1; MG/1
1 TABLET, FILM COATED ORAL DAILY
COMMUNITY

## 2023-05-25 RX ORDER — FENTANYL 25 UG/1
1 PATCH TRANSDERMAL
COMMUNITY

## 2023-05-25 RX ORDER — LANSOPRAZOLE 30 MG/1
30 CAPSULE, DELAYED RELEASE ORAL
COMMUNITY

## 2023-05-25 RX ORDER — ACETAMINOPHEN 160 MG/5ML
15 SOLUTION ORAL EVERY 6 HOURS PRN
COMMUNITY

## 2023-05-25 RX ORDER — DOCUSATE SODIUM 50 MG/5ML
100 LIQUID ORAL 2 TIMES DAILY
COMMUNITY

## 2023-05-25 RX ORDER — ALBUTEROL SULFATE 2.5 MG/3ML
2.5 SOLUTION RESPIRATORY (INHALATION)
COMMUNITY

## 2023-05-25 RX ORDER — PREDNISONE 20 MG/1
20 TABLET ORAL DAILY
COMMUNITY

## 2023-10-04 NOTE — PROGRESS NOTES
Progressing towards goal 60 year old female with history of significant for COPD, Meniere's disease s/p R labyrinthectomy, bipolar disorder p/w vertigo, blurry vision, loss of balance when ambulating. Presents as code stroke for acute onset dizziness, difficulty ambulating, s/p tenecteplase administration in ED.

## 2024-05-08 ENCOUNTER — APPOINTMENT (OUTPATIENT)
Facility: HOSPITAL | Age: 74
DRG: 890 | End: 2024-05-08
Payer: MEDICAID

## 2024-05-08 ENCOUNTER — HOSPITAL ENCOUNTER (INPATIENT)
Facility: HOSPITAL | Age: 74
LOS: 3 days | Discharge: ANOTHER HOSPITAL | DRG: 890 | End: 2024-05-11
Attending: STUDENT IN AN ORGANIZED HEALTH CARE EDUCATION/TRAINING PROGRAM | Admitting: HOSPITALIST
Payer: MEDICAID

## 2024-05-08 DIAGNOSIS — E87.0 HYPERNATREMIA: ICD-10-CM

## 2024-05-08 DIAGNOSIS — J18.9 PNEUMONIA OF RIGHT LOWER LOBE DUE TO INFECTIOUS ORGANISM: ICD-10-CM

## 2024-05-08 DIAGNOSIS — A41.9 SEVERE SEPSIS (HCC): Primary | ICD-10-CM

## 2024-05-08 DIAGNOSIS — N17.9 AKI (ACUTE KIDNEY INJURY) (HCC): ICD-10-CM

## 2024-05-08 DIAGNOSIS — R65.20 SEVERE SEPSIS (HCC): Primary | ICD-10-CM

## 2024-05-08 LAB
ALBUMIN SERPL-MCNC: 3 G/DL (ref 3.5–5)
ALBUMIN SERPL-MCNC: 3.9 G/DL (ref 3.5–5)
ALBUMIN/GLOB SERPL: 0.7 (ref 1.1–2.2)
ALBUMIN/GLOB SERPL: 0.8 (ref 1.1–2.2)
ALP SERPL-CCNC: 122 U/L (ref 45–117)
ALP SERPL-CCNC: 96 U/L (ref 45–117)
ALT SERPL-CCNC: 27 U/L (ref 12–78)
ALT SERPL-CCNC: 36 U/L (ref 12–78)
ANION GAP SERPL CALC-SCNC: 2 MMOL/L (ref 5–15)
ANION GAP SERPL CALC-SCNC: 3 MMOL/L (ref 5–15)
APPEARANCE UR: CLEAR
AST SERPL W P-5'-P-CCNC: 24 U/L (ref 15–37)
AST SERPL W P-5'-P-CCNC: 34 U/L (ref 15–37)
BACTERIA URNS QL MICRO: NEGATIVE /HPF
BASE EXCESS BLD CALC-SCNC: 3.5 MMOL/L
BASOPHILS # BLD: 0 K/UL (ref 0–0.1)
BASOPHILS # BLD: 0 K/UL (ref 0–0.1)
BASOPHILS NFR BLD: 0 % (ref 0–1)
BASOPHILS NFR BLD: 0 % (ref 0–1)
BILIRUB SERPL-MCNC: 0.8 MG/DL (ref 0.2–1)
BILIRUB SERPL-MCNC: 0.9 MG/DL (ref 0.2–1)
BILIRUB UR QL: NEGATIVE
BUN SERPL-MCNC: 20 MG/DL (ref 6–20)
BUN SERPL-MCNC: 22 MG/DL (ref 6–20)
BUN/CREAT SERPL: 15 (ref 12–20)
BUN/CREAT SERPL: 17 (ref 12–20)
CA-I BLD-MCNC: 1.23 MMOL/L (ref 1.12–1.32)
CA-I BLD-MCNC: 8.8 MG/DL (ref 8.5–10.1)
CA-I BLD-MCNC: 9.7 MG/DL (ref 8.5–10.1)
CHLORIDE BLD-SCNC: 124 MMOL/L (ref 98–107)
CHLORIDE SERPL-SCNC: 122 MMOL/L (ref 97–108)
CHLORIDE SERPL-SCNC: 129 MMOL/L (ref 97–108)
CHP ED QC CHECK: NORMAL
CK SERPL-CCNC: 148 U/L (ref 39–308)
CO2 BLD-SCNC: 31 MMOL/L
CO2 SERPL-SCNC: 24 MMOL/L (ref 21–32)
CO2 SERPL-SCNC: 28 MMOL/L (ref 21–32)
COLOR UR: ABNORMAL
CREAT SERPL-MCNC: 1.31 MG/DL (ref 0.7–1.3)
CREAT SERPL-MCNC: 1.34 MG/DL (ref 0.7–1.3)
CREAT UR-MCNC: 0.95 MG/DL (ref 0.6–1.3)
DIFFERENTIAL METHOD BLD: ABNORMAL
DIFFERENTIAL METHOD BLD: ABNORMAL
EKG ATRIAL RATE: 122 BPM
EKG DIAGNOSIS: NORMAL
EKG P AXIS: 46 DEGREES
EKG P-R INTERVAL: 164 MS
EKG Q-T INTERVAL: 320 MS
EKG QRS DURATION: 86 MS
EKG QTC CALCULATION (BAZETT): 456 MS
EKG R AXIS: 18 DEGREES
EKG T AXIS: 44 DEGREES
EKG VENTRICULAR RATE: 122 BPM
EOSINOPHIL # BLD: 0 K/UL (ref 0–0.4)
EOSINOPHIL # BLD: 0.1 K/UL (ref 0–0.4)
EOSINOPHIL NFR BLD: 0 % (ref 0–7)
EOSINOPHIL NFR BLD: 1 % (ref 0–7)
ERYTHROCYTE [DISTWIDTH] IN BLOOD BY AUTOMATED COUNT: 13.1 % (ref 11.5–14.5)
ERYTHROCYTE [DISTWIDTH] IN BLOOD BY AUTOMATED COUNT: 13.3 % (ref 11.5–14.5)
FLUAV RNA SPEC QL NAA+PROBE: NOT DETECTED
FLUBV RNA SPEC QL NAA+PROBE: NOT DETECTED
GLOBULIN SER CALC-MCNC: 4.1 G/DL (ref 2–4)
GLOBULIN SER CALC-MCNC: 5.2 G/DL (ref 2–4)
GLUCOSE BLD STRIP.AUTO-MCNC: 112 MG/DL (ref 65–100)
GLUCOSE SERPL-MCNC: 100 MG/DL (ref 65–100)
GLUCOSE SERPL-MCNC: 101 MG/DL (ref 65–100)
GLUCOSE UR STRIP.AUTO-MCNC: NEGATIVE MG/DL
HCO3 BLD-SCNC: 31.1 MMOL/L (ref 19–28)
HCT VFR BLD AUTO: 42.5 % (ref 36.6–50.3)
HCT VFR BLD AUTO: 53.1 % (ref 36.6–50.3)
HGB BLD-MCNC: 13.4 G/DL (ref 12.1–17)
HGB BLD-MCNC: 17.1 G/DL (ref 12.1–17)
HGB UR QL STRIP: ABNORMAL
IMM GRANULOCYTES # BLD AUTO: 0 K/UL
IMM GRANULOCYTES # BLD AUTO: 0 K/UL (ref 0–0.04)
IMM GRANULOCYTES NFR BLD AUTO: 0 %
IMM GRANULOCYTES NFR BLD AUTO: 0 % (ref 0–0.5)
KETONES UR QL STRIP.AUTO: NEGATIVE MG/DL
LACTATE BLD-SCNC: 1.21 MMOL/L (ref 0.4–2)
LACTATE BLD-SCNC: 2.14 MMOL/L (ref 0.4–2)
LEUKOCYTE ESTERASE UR QL STRIP.AUTO: NEGATIVE
LIPASE SERPL-CCNC: 167 U/L (ref 13–75)
LYMPHOCYTES # BLD: 0.8 K/UL (ref 0.8–3.5)
LYMPHOCYTES # BLD: 1.6 K/UL (ref 0.8–3.5)
LYMPHOCYTES NFR BLD: 9 % (ref 12–49)
LYMPHOCYTES NFR BLD: 9 % (ref 12–49)
MAGNESIUM SERPL-MCNC: 2.6 MG/DL (ref 1.6–2.4)
MCH RBC QN AUTO: 30.7 PG (ref 26–34)
MCH RBC QN AUTO: 31.1 PG (ref 26–34)
MCHC RBC AUTO-ENTMCNC: 31.5 G/DL (ref 30–36.5)
MCHC RBC AUTO-ENTMCNC: 32.2 G/DL (ref 30–36.5)
MCV RBC AUTO: 96.5 FL (ref 80–99)
MCV RBC AUTO: 97.5 FL (ref 80–99)
MONOCYTES # BLD: 0.2 K/UL (ref 0–1)
MONOCYTES # BLD: 1.1 K/UL (ref 0–1)
MONOCYTES NFR BLD: 3 % (ref 5–13)
MONOCYTES NFR BLD: 6 % (ref 5–13)
MRSA DNA SPEC QL NAA+PROBE: DETECTED
MUCOUS THREADS URNS QL MICRO: NEGATIVE /LPF
NEUTS BAND NFR BLD MANUAL: 3 % (ref 0–6)
NEUTS SEG # BLD: 15.1 K/UL (ref 1.8–8)
NEUTS SEG # BLD: 7.2 K/UL (ref 1.8–8)
NEUTS SEG NFR BLD: 82 % (ref 32–75)
NEUTS SEG NFR BLD: 87 % (ref 32–75)
NITRITE UR QL STRIP.AUTO: NEGATIVE
NRBC # BLD: 0 K/UL (ref 0–0.01)
NRBC # BLD: 0 K/UL (ref 0–0.01)
NRBC BLD-RTO: 0 PER 100 WBC
NRBC BLD-RTO: 0 PER 100 WBC
PCO2 BLD: 55.3 MMHG (ref 35–45)
PERFORMED BY:: ABNORMAL
PERFORMED BY:: NORMAL
PH BLD: 7.36 (ref 7.35–7.45)
PH BLD: 7.42 (ref 7.35–7.45)
PH UR STRIP: 7 (ref 5–8)
PLATELET # BLD AUTO: 104 K/UL (ref 150–400)
PLATELET # BLD AUTO: 117 K/UL (ref 150–400)
PMV BLD AUTO: 12.2 FL (ref 8.9–12.9)
PMV BLD AUTO: 12.6 FL (ref 8.9–12.9)
PO2 BLD: <27 MMHG (ref 75–100)
POTASSIUM BLD-SCNC: 4.4 MMOL/L (ref 3.5–5.5)
POTASSIUM SERPL-SCNC: 3.9 MMOL/L (ref 3.5–5.1)
POTASSIUM SERPL-SCNC: 4.5 MMOL/L (ref 3.5–5.1)
PROCALCITONIN SERPL-MCNC: 0.21 NG/ML
PROT SERPL-MCNC: 7.1 G/DL (ref 6.4–8.2)
PROT SERPL-MCNC: 9.1 G/DL (ref 6.4–8.2)
PROT UR STRIP-MCNC: NEGATIVE MG/DL
RBC # BLD AUTO: 4.36 M/UL (ref 4.1–5.7)
RBC # BLD AUTO: 5.5 M/UL (ref 4.1–5.7)
RBC #/AREA URNS HPF: ABNORMAL /HPF (ref 0–5)
RBC MORPH BLD: ABNORMAL
SARS-COV-2 RNA RESP QL NAA+PROBE: NOT DETECTED
SERVICE CMNT-IMP: ABNORMAL
SODIUM BLD-SCNC: 167 MMOL/L (ref 136–145)
SODIUM SERPL-SCNC: 152 MMOL/L (ref 136–145)
SODIUM SERPL-SCNC: 156 MMOL/L (ref 136–145)
SP GR UR REFRACTOMETRY: 1.01 (ref 1–1.03)
SPECIMEN SITE: ABNORMAL
SPECIMEN TYPE: NORMAL
TROPONIN I SERPL HS-MCNC: 8 NG/L (ref 0–76)
URINE CULTURE IF INDICATED: ABNORMAL
UROBILINOGEN UR QL STRIP.AUTO: 0.1 EU/DL (ref 0.1–1)
WBC # BLD AUTO: 17.8 K/UL (ref 4.1–11.1)
WBC # BLD AUTO: 8.4 K/UL (ref 4.1–11.1)
WBC URNS QL MICRO: ABNORMAL /HPF (ref 0–4)

## 2024-05-08 PROCEDURE — 6370000000 HC RX 637 (ALT 250 FOR IP): Performed by: HOSPITALIST

## 2024-05-08 PROCEDURE — 81001 URINALYSIS AUTO W/SCOPE: CPT

## 2024-05-08 PROCEDURE — 94761 N-INVAS EAR/PLS OXIMETRY MLT: CPT

## 2024-05-08 PROCEDURE — 2580000003 HC RX 258: Performed by: STUDENT IN AN ORGANIZED HEALTH CARE EDUCATION/TRAINING PROGRAM

## 2024-05-08 PROCEDURE — 83690 ASSAY OF LIPASE: CPT

## 2024-05-08 PROCEDURE — 82550 ASSAY OF CK (CPK): CPT

## 2024-05-08 PROCEDURE — 93005 ELECTROCARDIOGRAM TRACING: CPT | Performed by: STUDENT IN AN ORGANIZED HEALTH CARE EDUCATION/TRAINING PROGRAM

## 2024-05-08 PROCEDURE — 6360000002 HC RX W HCPCS: Performed by: STUDENT IN AN ORGANIZED HEALTH CARE EDUCATION/TRAINING PROGRAM

## 2024-05-08 PROCEDURE — 96365 THER/PROPH/DIAG IV INF INIT: CPT

## 2024-05-08 PROCEDURE — 84484 ASSAY OF TROPONIN QUANT: CPT

## 2024-05-08 PROCEDURE — 84132 ASSAY OF SERUM POTASSIUM: CPT

## 2024-05-08 PROCEDURE — 87040 BLOOD CULTURE FOR BACTERIA: CPT

## 2024-05-08 PROCEDURE — 82947 ASSAY GLUCOSE BLOOD QUANT: CPT

## 2024-05-08 PROCEDURE — 84295 ASSAY OF SERUM SODIUM: CPT

## 2024-05-08 PROCEDURE — 6360000002 HC RX W HCPCS: Performed by: HOSPITALIST

## 2024-05-08 PROCEDURE — 83605 ASSAY OF LACTIC ACID: CPT

## 2024-05-08 PROCEDURE — 99223 1ST HOSP IP/OBS HIGH 75: CPT | Performed by: INTERNAL MEDICINE

## 2024-05-08 PROCEDURE — 82803 BLOOD GASES ANY COMBINATION: CPT

## 2024-05-08 PROCEDURE — 74018 RADEX ABDOMEN 1 VIEW: CPT

## 2024-05-08 PROCEDURE — 84145 PROCALCITONIN (PCT): CPT

## 2024-05-08 PROCEDURE — 71045 X-RAY EXAM CHEST 1 VIEW: CPT

## 2024-05-08 PROCEDURE — 87641 MR-STAPH DNA AMP PROBE: CPT

## 2024-05-08 PROCEDURE — 99285 EMERGENCY DEPT VISIT HI MDM: CPT

## 2024-05-08 PROCEDURE — 96366 THER/PROPH/DIAG IV INF ADDON: CPT

## 2024-05-08 PROCEDURE — 2580000003 HC RX 258: Performed by: HOSPITALIST

## 2024-05-08 PROCEDURE — 94640 AIRWAY INHALATION TREATMENT: CPT

## 2024-05-08 PROCEDURE — 80053 COMPREHEN METABOLIC PANEL: CPT

## 2024-05-08 PROCEDURE — 96368 THER/DIAG CONCURRENT INF: CPT

## 2024-05-08 PROCEDURE — 6370000000 HC RX 637 (ALT 250 FOR IP): Performed by: STUDENT IN AN ORGANIZED HEALTH CARE EDUCATION/TRAINING PROGRAM

## 2024-05-08 PROCEDURE — 83735 ASSAY OF MAGNESIUM: CPT

## 2024-05-08 PROCEDURE — 85025 COMPLETE CBC W/AUTO DIFF WBC: CPT

## 2024-05-08 PROCEDURE — 87636 SARSCOV2 & INF A&B AMP PRB: CPT

## 2024-05-08 PROCEDURE — 1100000000 HC RM PRIVATE

## 2024-05-08 PROCEDURE — 6370000000 HC RX 637 (ALT 250 FOR IP): Performed by: INTERNAL MEDICINE

## 2024-05-08 PROCEDURE — 82330 ASSAY OF CALCIUM: CPT

## 2024-05-08 PROCEDURE — 6360000004 HC RX CONTRAST MEDICATION: Performed by: FAMILY MEDICINE

## 2024-05-08 RX ORDER — FENTANYL 25 UG/1
1 PATCH TRANSDERMAL
Status: DISCONTINUED | OUTPATIENT
Start: 2024-05-10 | End: 2024-05-11 | Stop reason: HOSPADM

## 2024-05-08 RX ORDER — LANOLIN ALCOHOL/MO/W.PET/CERES
1000 CREAM (GRAM) TOPICAL DAILY
Status: DISCONTINUED | OUTPATIENT
Start: 2024-05-08 | End: 2024-05-11 | Stop reason: HOSPADM

## 2024-05-08 RX ORDER — SODIUM CHLORIDE, SODIUM LACTATE, POTASSIUM CHLORIDE, CALCIUM CHLORIDE 600; 310; 30; 20 MG/100ML; MG/100ML; MG/100ML; MG/100ML
INJECTION, SOLUTION INTRAVENOUS CONTINUOUS
Status: DISPENSED | OUTPATIENT
Start: 2024-05-08 | End: 2024-05-09

## 2024-05-08 RX ORDER — EMTRICITABINE AND TENOFOVIR DISOPROXIL FUMARATE 200; 300 MG/1; MG/1
1 TABLET, FILM COATED ORAL DAILY
Status: DISCONTINUED | OUTPATIENT
Start: 2024-05-08 | End: 2024-05-11 | Stop reason: HOSPADM

## 2024-05-08 RX ORDER — ACETAMINOPHEN 325 MG/1
650 TABLET ORAL EVERY 6 HOURS PRN
Status: DISCONTINUED | OUTPATIENT
Start: 2024-05-08 | End: 2024-05-11 | Stop reason: HOSPADM

## 2024-05-08 RX ORDER — LANSOPRAZOLE 30 MG/1
30 TABLET, ORALLY DISINTEGRATING, DELAYED RELEASE ORAL
Status: DISCONTINUED | OUTPATIENT
Start: 2024-05-08 | End: 2024-05-11 | Stop reason: HOSPADM

## 2024-05-08 RX ORDER — GAUZE BANDAGE 2" X 2"
50 BANDAGE TOPICAL DAILY
Status: DISCONTINUED | OUTPATIENT
Start: 2024-05-08 | End: 2024-05-11 | Stop reason: HOSPADM

## 2024-05-08 RX ORDER — ACETAMINOPHEN 650 MG/1
650 SUPPOSITORY RECTAL EVERY 6 HOURS PRN
Status: DISCONTINUED | OUTPATIENT
Start: 2024-05-08 | End: 2024-05-11 | Stop reason: HOSPADM

## 2024-05-08 RX ORDER — ACETAMINOPHEN 500 MG
1000 TABLET ORAL ONCE
Status: DISCONTINUED | OUTPATIENT
Start: 2024-05-08 | End: 2024-05-08

## 2024-05-08 RX ORDER — 0.9 % SODIUM CHLORIDE 0.9 %
1000 INTRAVENOUS SOLUTION INTRAVENOUS ONCE
Status: DISCONTINUED | OUTPATIENT
Start: 2024-05-08 | End: 2024-05-08

## 2024-05-08 RX ORDER — PREDNISONE 20 MG/1
20 TABLET ORAL DAILY
Status: DISCONTINUED | OUTPATIENT
Start: 2024-05-08 | End: 2024-05-11 | Stop reason: HOSPADM

## 2024-05-08 RX ORDER — ALBUTEROL SULFATE 90 UG/1
2 AEROSOL, METERED RESPIRATORY (INHALATION) EVERY 4 HOURS PRN
Status: DISCONTINUED | OUTPATIENT
Start: 2024-05-08 | End: 2024-05-11 | Stop reason: HOSPADM

## 2024-05-08 RX ORDER — SENNOSIDES A AND B 8.6 MG/1
1 TABLET, FILM COATED ORAL NIGHTLY
Status: DISCONTINUED | OUTPATIENT
Start: 2024-05-08 | End: 2024-05-11 | Stop reason: HOSPADM

## 2024-05-08 RX ORDER — IPRATROPIUM BROMIDE AND ALBUTEROL SULFATE 2.5; .5 MG/3ML; MG/3ML
1 SOLUTION RESPIRATORY (INHALATION) EVERY 4 HOURS PRN
Status: DISCONTINUED | OUTPATIENT
Start: 2024-05-08 | End: 2024-05-08

## 2024-05-08 RX ORDER — ACETAMINOPHEN 160 MG/5ML
650 LIQUID ORAL ONCE
Status: COMPLETED | OUTPATIENT
Start: 2024-05-08 | End: 2024-05-08

## 2024-05-08 RX ORDER — 0.9 % SODIUM CHLORIDE 0.9 %
30 INTRAVENOUS SOLUTION INTRAVENOUS ONCE
Status: COMPLETED | OUTPATIENT
Start: 2024-05-08 | End: 2024-05-08

## 2024-05-08 RX ORDER — ALBUTEROL SULFATE 2.5 MG/.5ML
2.5 SOLUTION RESPIRATORY (INHALATION) EVERY 4 HOURS PRN
Status: DISCONTINUED | OUTPATIENT
Start: 2024-05-08 | End: 2024-05-08

## 2024-05-08 RX ORDER — ENOXAPARIN SODIUM 100 MG/ML
40 INJECTION SUBCUTANEOUS DAILY
Status: DISCONTINUED | OUTPATIENT
Start: 2024-05-08 | End: 2024-05-11 | Stop reason: HOSPADM

## 2024-05-08 RX ORDER — ACETAMINOPHEN 160 MG/5ML
325 LIQUID ORAL EVERY 6 HOURS PRN
Status: DISCONTINUED | OUTPATIENT
Start: 2024-05-08 | End: 2024-05-11 | Stop reason: HOSPADM

## 2024-05-08 RX ADMIN — ALBUTEROL SULFATE 2.5 MG: 2.5 SOLUTION RESPIRATORY (INHALATION) at 21:37

## 2024-05-08 RX ADMIN — THIAMINE HCL TAB 100 MG 50 MG: 100 TAB at 10:53

## 2024-05-08 RX ADMIN — MUPIROCIN: 20 OINTMENT TOPICAL at 21:20

## 2024-05-08 RX ADMIN — Medication 1000 MCG: at 10:53

## 2024-05-08 RX ADMIN — PIPERACILLIN AND TAZOBACTAM 3375 MG: 3; .375 INJECTION, POWDER, LYOPHILIZED, FOR SOLUTION INTRAVENOUS at 07:30

## 2024-05-08 RX ADMIN — SODIUM CHLORIDE, POTASSIUM CHLORIDE, SODIUM LACTATE AND CALCIUM CHLORIDE: 600; 310; 30; 20 INJECTION, SOLUTION INTRAVENOUS at 14:08

## 2024-05-08 RX ADMIN — LANSOPRAZOLE 30 MG: 30 TABLET, ORALLY DISINTEGRATING ORAL at 10:53

## 2024-05-08 RX ADMIN — PIPERACILLIN AND TAZOBACTAM 4500 MG: 4; .5 INJECTION, POWDER, LYOPHILIZED, FOR SOLUTION INTRAVENOUS at 01:24

## 2024-05-08 RX ADMIN — ALBUTEROL SULFATE 2.5 MG: 2.5 SOLUTION RESPIRATORY (INHALATION) at 13:22

## 2024-05-08 RX ADMIN — SODIUM CHLORIDE, POTASSIUM CHLORIDE, SODIUM LACTATE AND CALCIUM CHLORIDE: 600; 310; 30; 20 INJECTION, SOLUTION INTRAVENOUS at 06:36

## 2024-05-08 RX ADMIN — PREDNISONE 20 MG: 20 TABLET ORAL at 10:53

## 2024-05-08 RX ADMIN — DOCUSATE SODIUM 100 MG: 50 LIQUID ORAL at 21:36

## 2024-05-08 RX ADMIN — ALBUTEROL SULFATE 2.5 MG: 2.5 SOLUTION RESPIRATORY (INHALATION) at 09:28

## 2024-05-08 RX ADMIN — ACETAMINOPHEN 650 MG: 650 SOLUTION ORAL at 02:22

## 2024-05-08 RX ADMIN — DOCUSATE SODIUM 100 MG: 50 LIQUID ORAL at 10:53

## 2024-05-08 RX ADMIN — DIATRIZOATE MEGLUMINE AND DIATRIZOATE SODIUM 30 ML: 660; 100 LIQUID ORAL; RECTAL at 14:31

## 2024-05-08 RX ADMIN — Medication 1500 MG: at 21:30

## 2024-05-08 RX ADMIN — RALTEGRAVIR 400 MG: 400 TABLET, FILM COATED ORAL at 22:00

## 2024-05-08 RX ADMIN — ENOXAPARIN SODIUM 40 MG: 100 INJECTION SUBCUTANEOUS at 10:53

## 2024-05-08 RX ADMIN — VANCOMYCIN HYDROCHLORIDE 2500 MG: 1 INJECTION, POWDER, LYOPHILIZED, FOR SOLUTION INTRAVENOUS at 02:22

## 2024-05-08 RX ADMIN — SENNOSIDES 8.6 MG: 8.6 TABLET, FILM COATED ORAL at 21:34

## 2024-05-08 RX ADMIN — PIPERACILLIN AND TAZOBACTAM 3375 MG: 3; .375 INJECTION, POWDER, LYOPHILIZED, FOR SOLUTION INTRAVENOUS at 14:08

## 2024-05-08 RX ADMIN — SODIUM CHLORIDE 2328 ML: 9 INJECTION, SOLUTION INTRAVENOUS at 01:27

## 2024-05-08 RX ADMIN — PIPERACILLIN AND TAZOBACTAM 3375 MG: 3; .375 INJECTION, POWDER, LYOPHILIZED, FOR SOLUTION INTRAVENOUS at 23:00

## 2024-05-08 ASSESSMENT — PAIN - FUNCTIONAL ASSESSMENT: PAIN_FUNCTIONAL_ASSESSMENT: NONE - DENIES PAIN

## 2024-05-08 ASSESSMENT — PAIN SCALES - WONG BAKER: WONGBAKER_NUMERICALRESPONSE: NO HURT

## 2024-05-08 NOTE — RT PROTOCOL NOTE
RT Nebulizer Bronchodilator Protocol Note    There is a bronchodilator order in the chart from a provider indicating to follow the RT Bronchodilator Protocol and there is an “Initiate RT Bronchodilator Protocol” order as well (see protocol at bottom of note).    CXR Findings:  XR CHEST PORTABLE    Result Date: 5/8/2024  Mild interstitial edema versus pneumonia.       The findings from the last RT Protocol Assessment were as follows:  Smoking: None or smoker <15 pack years  Respiratory Pattern: Regular pattern and RR 12-20 bpm  Breath Sounds: Inspiratory and expiratory or bilateral wheezing and/or rhonchi  Cough: Weak, productive  Indication for Bronchodilator Therapy:    Bronchodilator Assessment Score: 8    Aerosolized bronchodilator medication orders have been revised according to the RT Nebulizer Bronchodilator Protocol below.    Respiratory Therapist to perform RT Therapy Protocol Assessment initially then follow the protocol.  Repeat RT Therapy Protocol Assessment PRN for score 0-3 or on second treatment, BID, and PRN for scores above 3.    No Indications - adjust the frequency to every 6 hours PRN wheezing or bronchospasm, if no treatments needed after 48 hours then discontinue using Per Protocol order mode.     If indication present, adjust the RT bronchodilator orders based on the Bronchodilator Assessment Score as indicated below.  If a patient is on this medication at home then do not decrease Frequency below that used at home.    0-3 - enter or revise RT bronchodilator order(s) to equivalent RT Bronchodilator order with Frequency of every 4 hours PRN for wheezing or increased work of breathing using Per Protocol order mode.       4-6 - enter or revise RT Bronchodilator order(s) to two equivalent RT bronchodilator orders with one order with BID Frequency and one order with Frequency of every 4 hours PRN wheezing or increased work of breathing using Per Protocol order mode.         7-10 - enter or revise RT

## 2024-05-08 NOTE — ED TRIAGE NOTES
[facility] where he was found to be febrile and tachy , with a Rhonchi cough . Patient is non-verbal . And has contractions

## 2024-05-08 NOTE — ED NOTES
Received a call from RN who transported patient to his room, that the patient Fentanyl patch  was found on the bed . Patch was discarded  in in waste bin ..  CN notified of such as well

## 2024-05-08 NOTE — PROGRESS NOTES
Hospitalist Progress Note    NAME:   Bravo Martinez   : 1950   MRN: [id number]     Date/Time: 2024 2:02 PM  Patient PCP: Enrique Emanuel MD    Estimated discharge date: 48 hours  Barriers: GI, ID and pulmonology evaluations, continue IV antibiotics      Assessment / Plan:  Sepsis likely secondary to pneumonia, concerns of aspiration  -WBC 17.8  -CXR revealed mild interstitial edema versus pneumonia  -Consult ID and pulmonology  -Continue IV vancomycin and Zosyn  -Continue prednisone and albuterol with additional epidural treatments as needed  -MRSA by PCR positive    KRISTINE  Hypernatremia  Hyperchloremia  Above likely secondary to dehydration  -Creatinine 1.34 sodium 156, chloride 129  -Continue IV fluids  -If no improvement tomorrow after IV fluids will consult nephrology    GERD  PEG tube feedings  -Continue PEG tube feedings  -Continue lansoprazole per G-tube  -GI consult for evaluation of G-tube as nurses experience difficulty flushing    HIV  History of hepatitis C, unsure of status  -Continue Truvada and Isentress  -Infectious disease consulted for above    Quadriplegia with flexion contractures  Schizophrenia  Intellectual disability  -All the above required total care and G-tube feedings    Thrombocytopenia  -Platelets 104  -Hold Lovenox and continue to monitor    Vitamin deficiency  -Continue B12 and thiamine supplementation    Patient is a resident at [facility].  Inform CM patient has been palliative care for 2 years and would like no invasive measures such as central line or surgery    Medical Decision Making:   I personally reviewed labs: CBC CMP MRSA by PCR  I personally reviewed imaging: CXR  Toxic drug monitoring: Lovenox for any signs of bleeding monitor H&H  Discussed case with: Patient and RN    Code Status: DNR/DNI  DVT Prophylaxis: Lovenox  GI Prophylaxis: Lansoprazole    Subjective:     Chief Complaint / Reason for Physician Visit  Patient evaluated bedside resting comfortably.  Does      PHYSICAL EXAM:  Physical Exam  Constitutional:       General: He is not in acute distress.     Appearance: He is ill-appearing.   HENT:      Head: Normocephalic and atraumatic.   Eyes:      Extraocular Movements: Extraocular movements intact.   Neck:      Comments: Mild stiffness in extension, supple with no JVD  Cardiovascular:      Rate and Rhythm: Normal rate and regular rhythm.      Heart sounds: Normal heart sounds.   Pulmonary:      Breath sounds: Rhonchi present.      Comments: Coarse breath sounds bilaterally  Abdominal:      Palpations: Abdomen is soft.      Tenderness: There is no abdominal tenderness.      Comments: PEG tube present site clean dry and intact   Musculoskeletal:      Right lower leg: No edema.      Left lower leg: No edema.      Comments: Stiff increased tone of muscles with decreased range of motion and flexion contractures in upper extremities   Skin:     General: Skin is warm and dry.   Neurological:      Mental Status: Mental status is at baseline.      Comments: History of schizophrenia and intellectual disability unable to assess         Reviewed most current lab test results and cultures  YES  Reviewed most current radiology test results   YES  Review and summation of old records today    NO  Reviewed patient's current orders and MAR    YES  PMH/SH reviewed - no change compared to H&P  ________________________________________________________________________  Care Plan discussed with:    Comments   Patient x    Family      RN x    Care Manager     Consultant                        Multidiciplinary team rounds were held today with , nursing, pharmacist and clinical coordinator.  Patient's plan of care was discussed; medications were reviewed and discharge planning was addressed.     ________________________________________________________________________  Total NON critical care TIME:  35  Minutes    Total CRITICAL CARE TIME Spent:   Minutes non procedure based

## 2024-05-08 NOTE — H&P
Hospitalist History & Physical Notes.           [facility].              Name : Bravo Martinez      MRN number : [id number]     YOB: 1950     Subjective :   Chief Complaint : Fever with tachycardia.  Congested cough    Source of information : From ED provider who had information from [facility].  Reviewed previous records discussed with staff.    History of present illness:   Bravo Martinez is  73 y.o. male totally bedridden with contractures of extremities on G-tube feeding with altered mental status cannot understand anything with below mentioned past medical history is brought to the emergency room as staff found that he is running temperature with the congestive cough and rhonchi.  Also tachycardic.  On arrival to the emergency room he has temperature of 104.  History of aspiration pneumonia in the past.  At this time source is not very clear chest x-ray suspicious for pneumonia but not very clear.  Possibility of urinary tract infection or wounds need to be evaluated.    He is started on IV fluids in the emergency room, received antibiotics.    Past Medical History:   Diagnosis Date    Bed confinement status     Dementia (HCC)     Hepatitis C     HIV (human immunodeficiency virus infection) (HCC)     Schizophrenia (HCC)      Surgical history: G-tube placement    Family history: Not available     Social History     Tobacco Use    Smoking status: Never    Smokeless tobacco: Never   Substance Use Topics    Alcohol use: Never   Is chronically bedridden status with flexion contractures of extremities needing total care, resident of [facility].    No Known Allergies     Current Facility-Administered Medications   Medication Dose Route Frequency Provider Last Rate Last Admin    acetaminophen (TYLENOL) 160 MG/5ML solution 325 mg  325 mg Oral Q6H PRN Uzma Lua MD        albuterol (PROVENTIL) (2.5 MG/3ML) 0.083% nebulizer solution 2.5 mg  2.5 mg  Time: 05/08/24  3:46 AM   Result Value Ref Range    POC pH 7.42 7.35 - 7.45      Specimen type: Venous      Performed by: Roque Bennett     POC Lactic Acid 1.21 0.40 - 2.00 mmol/L       Radiologic Studies :   Imaging:   XR CHEST PORTABLE   Final Result   Mild interstitial edema versus pneumonia.                 Assessment and Plan :     Sepsis: Most likely from pneumonia but cannot rule out skin and urine.  Need further evaluation, started on broad-spectrum antibiotics and sepsis protocol followed include bolus fluids.    Severe dehydration: Hypernatremia hyperchloremia and thalassemia secondary to volume contraction.  We will request from free water boluses via G-tube next 24 hours increase the quantity.  Started on IV fluids and will follow-up closely    Suspected pneumonia: Started on antibiotics.  Possibility of aspiration.  But patient has institutionalized, need to treat as healthcare associated pneumonia.    HIV disease status, continue the medications.   History of hepatitis C.-Not sure about the status    Quadriplegia with flexion contractures: History of schizophrenia.  Patient with intellectual disabilities needing total care and G-tube for feedings.    Diet is held for the next 24 hours due to sepsis and possibility of pneumonia.    Medications Home :    Reviewed    Code status : DO NOT RESUSCITATE    VTE prophylaxis :  Enoxaparin at reduced dose    Advance Medical directive : Health care decision maker information is on file.         Discussion/MDM:   I have discussed patient's presentation/findings and clinical course to date with ED provider.     Given the patient's current clinical presentation, I have a high level of concern for decompensation if discharged from the emergency department as patient has multiple medical comorbidities with increased risk of morbidity and mortality  that warrants admission to hospital.     I have reviewed patient's presenting subjective and objective findings, as well as

## 2024-05-08 NOTE — CONSULTS
thiamine mononitrate tablet 50 mg  50 mg Oral Daily Uzma Lua MD   50 mg at 05/08/24 1053    lansoprazole (PREVACID SOLUTAB) disintegrating tablet 30 mg  30 mg Per G Tube QAM AC Uzma Lua MD   30 mg at 05/08/24 1053    [Held by provider] enoxaparin (LOVENOX) injection 40 mg  40 mg SubCUTAneous Daily Uzma Lua MD   40 mg at 05/08/24 1053    acetaminophen (TYLENOL) tablet 650 mg  650 mg Oral Q6H PRN Uzma Lua MD        Or    acetaminophen (TYLENOL) suppository 650 mg  650 mg Rectal Q6H PRN Uzma Lua MD        lactated ringers IV soln infusion   IntraVENous Continuous Uzma Lua  mL/hr at 05/08/24 1408 New Bag at 05/08/24 1408    piperacillin-tazobactam (ZOSYN) 3,375 mg in sodium chloride 0.9 % 50 mL IVPB (mini-bag)  3,375 mg IntraVENous Q8H Uzma Lua MD 12.5 mL/hr at 05/08/24 1408 3,375 mg at 05/08/24 1408    vancomycin (VANCOCIN) 1500 mg in sodium chloride 0.9% 500 mL IVPB  1,500 mg IntraVENous Q24H Uzma Lua MD        vancomycin (VANCOCIN) intermittent dosing (placeholder)  1 each Other RX Placeholder Uzma Lua MD        [START ON 5/9/2024] Vancomycin - Draw a level on THURS (5/9) @ 1800   Other Once Uzma Lua MD        albuterol (PROVENTIL) nebulizer solution 2.5 mg  2.5 mg Nebulization Q6H WA Lambert Johnson PA-C   2.5 mg at 05/08/24 1322    albuterol sulfate HFA (PROVENTIL;VENTOLIN;PROAIR) 108 (90 Base) MCG/ACT inhaler 2 puff  2 puff Inhalation Q4H PRN Jason Vázquez MD        diatrizoate meglumine-sodium (GASTROGRAFIN) 66-10 % solution 30 mL  30 mL PEG Tube ONCE PRN Jason Vázquez MD   30 mL at 05/08/24 1431      Patient PCP: Enrique Emanuel MD  PMH:  has a past medical history of Bed confinement status, Dementia (HCC), Hepatitis C, HIV (human immunodeficiency virus infection) (HCC), and Schizophrenia (HCC).  PSH:   has no past surgical history on file.   FHX:  acids from SARS-CoV-2, Influenza A, and Influenza B in nasopharyngeal and nasal swab specimens for use under the FDA's Emergency Use Authorization (EUA) only.   Fact sheet for Patients: https://www.fda.gov/media/171019/download Fact sheet   for Healthcare Providers: https://www.fda.fov/media/617306/download                  Imaging:  XR ABDOMEN (KUB) (SINGLE AP VIEW)    Result Date: 5/8/2024  EXAM:  XR ABDOMEN (KUB) (SINGLE AP VIEW) INDICATION: PEG tube COMPARISON: None. TECHNIQUE: Supine frontal abdomen (KUB). FINDINGS: Contrast instilled through the indwelling PEG tube demonstrates free flow into the proximal normal-appearing jejunum..     Functioning PEG tube     XR CHEST PORTABLE    Result Date: 5/8/2024  EXAM:  XR CHEST PORTABLE Clinical history: Sepsis INDICATION:   Sepsis COMPARISON: None FINDINGS: AP portable upright view of the chest demonstrates an normal cardiopericardial silhouette.There is associated increased interstitial opacity.There is no pneumothorax. Patient is on a cardiac monitor.     Mild interstitial edema versus pneumonia.        ARTERIAL BLOOD GAS  No results for input(s): \"PHART\", \"FDH4SBI\", \"PO2ART\", \"YCE3UME\", \"BEART\", \"JYQ4ZHYK\", \"HGBART\", \"IS1YZRWTQ\", \"FIO2A\", \"M9FTQABO\", \"OXYHEM\", \"CARBOXHGBART\", \"METHGBART\", \"V7AIETENV\", \"PHCORART\", \"TEMP\" in the last 72 hours.    Invalid input(s): \"FNY4GXAWY\"  No results found for this or any previous visit.    IMPRESSION:   Aspiration pneumonia  Acute kidney injury  Gastroesophageal reflux disease  HIV history of hepatitis C  Quadriplegic  Thrombocytopenia  Intellectual disability and history of schizophrenia  Pt is requiring Drug therapy requiring intensive monitoring for toxicity  Pt is unstable, unpredictable needing inpatient monitoring; is acutely ill and at high risk of sudden decline and decompensation with severe consequenses and continued end organ dysfunction and failure  Prognosis guarded       RECOMMENDATIONS/PLAN:     73-year-old male

## 2024-05-08 NOTE — CONSULTS
Platelets 117 (L) 150 - 400 K/uL    MPV 12.6 8.9 - 12.9 FL    Nucleated RBCs 0.0 0.0  WBC    nRBC 0.00 0.00 - 0.01 K/uL    Neutrophils % 87 (H) 32 - 75 %    Lymphocytes % 9 (L) 12 - 49 %    Monocytes % 3 (L) 5 - 13 %    Eosinophils % 1 0 - 7 %    Basophils % 0 0 - 1 %    Immature Granulocytes % 0 0 - 0.5 %    Neutrophils Absolute 7.2 1.8 - 8.0 K/UL    Lymphocytes Absolute 0.8 0.8 - 3.5 K/UL    Monocytes Absolute 0.2 0.0 - 1.0 K/UL    Eosinophils Absolute 0.1 0.0 - 0.4 K/UL    Basophils Absolute 0.0 0.0 - 0.1 K/UL    Immature Granulocytes Absolute 0.0 0.00 - 0.04 K/UL    Differential Type AUTOMATED     Comprehensive Metabolic Panel    Collection Time: 05/08/24  1:31 AM   Result Value Ref Range    Sodium 152 (H) 136 - 145 mmol/L    Potassium 4.5 3.5 - 5.1 mmol/L    Chloride 122 (H) 97 - 108 mmol/L    CO2 28 21 - 32 mmol/L    Anion Gap 2 (L) 5 - 15 mmol/L    Glucose 101 (H) 65 - 100 mg/dL    BUN 22 (H) 6 - 20 mg/dL    Creatinine 1.31 (H) 0.70 - 1.30 mg/dL    Bun/Cre Ratio 17 12 - 20      Est, Glom Filt Rate 58 (L) >60 ml/min/1.73m2    Calcium 9.7 8.5 - 10.1 mg/dL    Total Bilirubin 0.8 0.2 - 1.0 mg/dL    AST 34 15 - 37 U/L    ALT 36 12 - 78 U/L    Alk Phosphatase 122 (H) 45 - 117 U/L    Total Protein 9.1 (H) 6.4 - 8.2 g/dL    Albumin 3.9 3.5 - 5.0 g/dL    Globulin 5.2 (H) 2.0 - 4.0 g/dL    Albumin/Globulin Ratio 0.8 (L) 1.1 - 2.2     Procalcitonin    Collection Time: 05/08/24  1:31 AM   Result Value Ref Range    Procalcitonin 0.21 (H) 0 ng/mL   Troponin    Collection Time: 05/08/24  1:31 AM   Result Value Ref Range    Troponin, High Sensitivity 8 0 - 76 ng/L   Urinalysis with Reflex to Culture    Collection Time: 05/08/24  1:31 AM    Specimen: Urine   Result Value Ref Range    Color, UA Yellow/Straw      Appearance Clear Clear      Specific Gravity, UA 1.013 1.003 - 1.030      pH, Urine 7.0 5.0 - 8.0      Protein, UA Negative Negative mg/dL    Glucose, Ur Negative Negative mg/dL    Ketones, Urine Negative  7.45      Specimen type: Venous      Performed by: Roque Bennett     POC Lactic Acid 1.21 0.40 - 2.00 mmol/L   Comprehensive Metabolic Panel w/ Reflex to MG    Collection Time: 05/08/24  6:45 AM   Result Value Ref Range    Sodium 156 (H) 136 - 145 mmol/L    Potassium 3.9 3.5 - 5.1 mmol/L    Chloride 129 (H) 97 - 108 mmol/L    CO2 24 21 - 32 mmol/L    Anion Gap 3 (L) 5 - 15 mmol/L    Glucose 100 65 - 100 mg/dL    BUN 20 6 - 20 mg/dL    Creatinine 1.34 (H) 0.70 - 1.30 mg/dL    Bun/Cre Ratio 15 12 - 20      Est, Glom Filt Rate 57 (L) >60 ml/min/1.73m2    Calcium 8.8 8.5 - 10.1 mg/dL    Total Bilirubin 0.9 0.2 - 1.0 mg/dL    AST 24 15 - 37 U/L    ALT 27 12 - 78 U/L    Alk Phosphatase 96 45 - 117 U/L    Total Protein 7.1 6.4 - 8.2 g/dL    Albumin 3.0 (L) 3.5 - 5.0 g/dL    Globulin 4.1 (H) 2.0 - 4.0 g/dL    Albumin/Globulin Ratio 0.7 (L) 1.1 - 2.2     CBC with Auto Differential    Collection Time: 05/08/24  6:45 AM   Result Value Ref Range    WBC 17.8 (H) 4.1 - 11.1 K/uL    RBC 4.36 4.10 - 5.70 M/uL    Hemoglobin 13.4 12.1 - 17.0 g/dL    Hematocrit 42.5 36.6 - 50.3 %    MCV 97.5 80.0 - 99.0 FL    MCH 30.7 26.0 - 34.0 PG    MCHC 31.5 30.0 - 36.5 g/dL    RDW 13.3 11.5 - 14.5 %    Platelets 104 (L) 150 - 400 K/uL    MPV 12.2 8.9 - 12.9 FL    Nucleated RBCs 0.0 0.0  WBC    nRBC 0.00 0.00 - 0.01 K/uL    Neutrophils % 82 (H) 32 - 75 %    Band Neutrophils 3 0 - 6 %    Lymphocytes % 9 (L) 12 - 49 %    Monocytes % 6 5 - 13 %    Eosinophils % 0 0 - 7 %    Basophils % 0 0 - 1 %    Immature Granulocytes % 0 %    Neutrophils Absolute 15.1 (H) 1.8 - 8.0 K/UL    Lymphocytes Absolute 1.6 0.8 - 3.5 K/UL    Monocytes Absolute 1.1 (H) 0.0 - 1.0 K/UL    Eosinophils Absolute 0.0 0.0 - 0.4 K/UL    Basophils Absolute 0.0 0.0 - 0.1 K/UL    Immature Granulocytes Absolute 0.0 K/UL    Differential Type Manual      RBC Comment Normocytic, Normochromic     MRSA by PCR    Collection Time: 05/08/24 11:00 AM    Specimen: Swab   Result Value Ref

## 2024-05-08 NOTE — ED NOTES
the following components:    POC pCO2 55.3 (*)     POC PO2 <27 (*)     POC Sodium 167 (*)     POC Glucose 112 (*)     POC Chloride 124 (*)     POC HCO3 31.1 (*)     POC Lactic Acid 2.14 (*)     All other components within normal limits       Background  Allergies: No Known Allergies  History:   Past Medical History:   Diagnosis Date    Bed confinement status     Dementia (HCC)     Hepatitis C     HIV (human immunodeficiency virus infection) (HCC)     Schizophrenia (HCC)        Assessment  Vitals:    Level of Consciousness: Alert (0)   Vitals:    05/08/24 0310 05/08/24 0311 05/08/24 0326 05/08/24 0507   BP: 114/76  115/75    Pulse: (!) 107 (!) 107 (!) 105    Resp: 26  26    Temp:    99.9 °F (37.7 °C)   TempSrc:    Axillary   SpO2: 94% 94% 95%    Weight:       Height:         Deterioration Index (DI): Deterioration Index: (!) 81.15  Deterioration Index (DI) Interventions Performed: Deterioration Index RN Interventions Performed : Charge RN Notified, Provider Notified of Clinical Concerns, Neuro Reassessed  O2 Flow Rate:    O2 Device:    Cardiac Rhythm: Cardiac Rhythm: Sinus tachycardia  Critical Lab Results: [unfilled]  Cultures: Cultures:Blood  NIH Score: NIH NIH Stroke Scale  Interval: Baseline  Level of Consciousness (1a): Alert  LOC Questions (1b): Answers neither question correctly  LOC Commands (1c): Performs neither task correctly  Best Gaze (2): Normal  Visual (3): No visual loss  Facial Palsy (4): Normal symmetrical movement  Motor Arm, Left (5a): No movement  Motor Arm, Right (5b): No drift  Motor Leg, Left (6a): No movement  Motor Leg, Right (6b): No drift  Limb Ataxia (7): Absent  Sensory (8): Normal  Best Language (9): Mute  Dysarthria (10): Normal  Extinction and Inattention (11): No abnormality  Total: 15   Active LDA's:   Peripheral IV 05/08/24 Left Antecubital (Active)     Active Central Lines:                          Active Wounds:    Active Almazan's:    Active Feeding Tubes:      Administered  Medications:   Medications   acetaminophen (TYLENOL) 160 MG/5ML solution 325 mg (has no administration in time range)   albuterol (PROVENTIL) (2.5 MG/3ML) 0.083% nebulizer solution 2.5 mg (has no administration in time range)   vitamin B-12 (CYANOCOBALAMIN) tablet 1,000 mcg (has no administration in time range)   docusate sodium (COLACE) 150 MG/15ML liquid 100 mg (has no administration in time range)   emtricitabine-tenofovir (TRUVADA) 200-300 MG per tablet 1 tablet (has no administration in time range)   fentaNYL (DURAGESIC) 25 MCG/HR 1 patch (has no administration in time range)   predniSONE (DELTASONE) tablet 20 mg (has no administration in time range)   raltegravir (ISENTRESS) tablet 400 mg (has no administration in time range)   senna (SENOKOT) tablet 8.6 mg (has no administration in time range)   thiamine tablet 50 mg (has no administration in time range)   lansoprazole suspension SUSP 30 mg (has no administration in time range)   enoxaparin Sodium (LOVENOX) injection 30 mg (has no administration in time range)   acetaminophen (TYLENOL) tablet 650 mg (has no administration in time range)     Or   acetaminophen (TYLENOL) suppository 650 mg (has no administration in time range)   lactated ringers IV soln infusion (has no administration in time range)   piperacillin-tazobactam (ZOSYN) 3,375 mg in sodium chloride 0.9 % 50 mL IVPB (mini-bag) (has no administration in time range)   piperacillin-tazobactam (ZOSYN) 4,500 mg in sodium chloride 0.9 % 100 mL IVPB (mini-bag) (0 mg IntraVENous Stopped 5/8/24 0322)   vancomycin (VANCOCIN) 2,500 mg in sodium chloride 0.9 % 500 mL IVPB (0 mg IntraVENous Stopped 5/8/24 0501)   sodium chloride 0.9 % bolus 2,328 mL (2,328 mLs IntraVENous New Bag 5/8/24 0127)   acetaminophen (TYLENOL) 160 MG/5ML solution 650 mg (650 mg PEG Tube Given 5/8/24 0222)     Last documented pain medication administration: N/A    Pertinent or High Risk Medications/Drips: no   If Yes, please provide

## 2024-05-08 NOTE — PLAN OF CARE
Problem: Discharge Planning  Goal: Discharge to home or other facility with appropriate resources  Outcome: Progressing  Flowsheets (Taken 5/8/2024 0643 by Angelita Colon, RITIKA)  Discharge to home or other facility with appropriate resources: Identify barriers to discharge with patient and caregiver     Problem: Safety - Adult  Goal: Free from fall injury  Outcome: Progressing     Problem: Skin/Tissue Integrity  Goal: Absence of new skin breakdown  Description: 1.  Monitor for areas of redness and/or skin breakdown  2.  Assess vascular access sites hourly  3.  Every 4-6 hours minimum:  Change oxygen saturation probe site  4.  Every 4-6 hours:  If on nasal continuous positive airway pressure, respiratory therapy assess nares and determine need for appliance change or resting period.  Outcome: Progressing     Problem: Pain  Goal: Verbalizes/displays adequate comfort level or baseline comfort level  Outcome: Progressing

## 2024-05-08 NOTE — PROGRESS NOTES
RRT Clinical Rounding Nurse Progress Report      SUBJECTIVE: Patient assessed secondary to elevated Deterioration Index Score.      Vitals:    05/08/24 0507 05/08/24 0530 05/08/24 0600 05/08/24 0603   BP:  110/71 (!) 94/56 (!) 109/92   Pulse:  94 94    Resp:   30    Temp: 99.9 °F (37.7 °C)  99.5 °F (37.5 °C)    TempSrc: Axillary      SpO2:  97%     Weight:       Height:            DETERIORATION INDEX SCORE: 77    ASSESSMENT:  Spoke with Cindy YOST regarding elevated DI. They report this is a new admit to the floor, pt is nonverbal and contracted, with low GCS being the primary contributor to the elevation. Plan to deep suction per primary RN and are currently reaching out to hospitalist for orders regarding this. They report pts score has been consistent since arrival to the ED    PLAN:  Will follow per RRT Clinical Rounding Program protocol.    Heladio Gonzalez RN  [facility]: [phone]  [facility]: [phone]

## 2024-05-08 NOTE — PROGRESS NOTES
Vancomycin Dosing Consult  Bravo Martinez is a 73 y.o. male with Sepsis d/t Pneumonia. Pharmacy was consulted by Dr. Lua to dose and monitor vancomycin. Today is day 1.    Antibiotic regimen: Vancomycin + Zosyn    Temp (24hrs), Av.2 °F (38.4 °C), Min:99.5 °F (37.5 °C), Max:104.3 °F (40.2 °C)    Recent Labs     24  0131 24  0140   WBC 8.4  --    CREATININE 1.31* 0.95   BUN 22*  --      Est CrCl: 95 mL/min  Concomitant nephrotoxic drugs: None    Cultures:    Blood x 2 - Pending    MRSA Swab: Not ordered, patient already received first dose of vancomycin    Target range: AUC/JASKARAN 400-600       Assessment/Plan:   Febrile; Pt received a LD of Vancomycin 2500 mg iv x 1 earlier this morning. Will continue tomorrow with a MD of 1500 mg iv q 24 hrs for a projected AUC of 435. A level has been scheduled for tomorrow @ 1800  Antimicrobial stop date TBD

## 2024-05-08 NOTE — CARE COORDINATION
05/08/24 1136   Service Assessment   Patient Orientation Unable to Assess   Cognition Severely Impaired   History Provided By Medical Record;Other (see comment)  ([facility] staff)   Primary Caregiver Other (Comment)   Support Systems Other (Comment)  (LT staff)   Patient's Healthcare Decision Maker is: Legal Next of Kin   PCP Verified by CM Yes   Prior Functional Level Assistance with the following:;Bathing;Dressing;Toileting;Cooking;Feeding;Housework;Shopping;Mobility   Current Functional Level Assistance with the following:;Bathing;Toileting;Dressing;Feeding;Cooking;Housework;Shopping;Mobility   Can patient return to prior living arrangement Yes   Ability to make needs known: Unable   Family able to assist with home care needs: No   Would you like for me to discuss the discharge plan with any other family members/significant others, and if so, who? Yes   Social/Functional History   Type of Home Facility   ADL Assistance Needs assistance   Toileting Needs assistance   Homemaking Assistance Needs assistance   Ambulation Assistance Non-ambulatory   Transfer Assistance Needs assistance   Active  No   Discharge Planning   Type of Residence Long-Term Care   Patient expects to be discharged to: Long-term care   Services At/After Discharge   Confirm Follow Up Transport Other (see comment)  ([facility])     Patient is a long term resident at [facility]. He has been there for many years and is total care. Upon discharge he is able to return. A doctor to doctor report will be needed prior to discharge with Dr. Emanuel [phone]. Nursing report can be called to [phone].    Clinical updates faxed to [phone].    Advance Care Planning     General Advance Care Planning (ACP) Conversation    Date of Conversation: 5/8/2024  Conducted with: Patient with Decision Making Capacity  Other persons present: None    Healthcare Decision Maker:   Primary Decision Maker: Frankie Martinez - Other, Legal Guardian -

## 2024-05-08 NOTE — ED PROVIDER NOTES
[facility]  EMERGENCY DEPARTMENT ENCOUNTER NOTE    Date: 5/8/2024  Patient Name: Bravo Martinez    History of Presenting Illness     Chief Complaint   Patient presents with    Blood Infection       History obtained from: EMS and Staff from patient's nursing home    HPI: Bravo Martinez, 73 y.o. male with past medical history as listed and reviewed below presents for cough and shortness of breath.  Nursing home called EMS with concern for aspiration as he was noted to be gurgling, coughing, and tachypneic.  On EMS arrival to the scene, he was noted to be febrile, and tachycardic.  He is nonverbal, PEG tube dependent, and has signed DNR paperwork.  On presentation, he was tachycardic, febrile, with normal blood pressure and oxygen on room air.  He is tachypneic and has bilateral rhonchi with intermittent productive cough.    Medical History   I reviewed the medical, surgical, family, and social history, as well as allergies:    PCP: Enrique Emanuel MD    Past Medical History:  Past Medical History:   Diagnosis Date    Bed confinement status     Dementia (HCC)     Hepatitis C     HIV (human immunodeficiency virus infection) (HCC)     Schizophrenia (HCC)      Past Surgical History:  No past surgical history on file.  Current Outpatient Medications:  Current Outpatient Medications   Medication Instructions    acetaminophen (TYLENOL) 160 MG/5ML solution 15 mg/kg, Enteral, EVERY 6 HOURS PRN    albuterol (PROVENTIL) 2.5 mg, Inhalation    cyanocobalamin 1,000 mcg, Oral, DAILY    docusate sodium (COLACE) 100 mg, Enteral, 2 TIMES DAILY    emtricitabine-tenofovir (TRUVADA) 200-300 MG per tablet 1 tablet, Oral, DAILY    fentaNYL (DURAGESIC) 25 MCG/HR 1 patch, TransDERmal, EVERY 72 HOURS    lansoprazole (PREVACID) 30 mg, Enteral, DAILY BEFORE BREAKFAST    predniSONE (DELTASONE) 20 mg, Oral, DAILY    raltegravir (ISENTRESS) 400 mg, Oral, 2 TIMES DAILY    senna (SENOKOT) 8.6 MG tablet 1 tablet, Enteral  ng/L   Urinalysis with Reflex to Culture    Collection Time: 05/08/24  1:31 AM    Specimen: Urine   Result Value Ref Range    Color, UA Yellow/Straw      Appearance Clear Clear      Specific Gravity, UA 1.013 1.003 - 1.030      pH, Urine 7.0 5.0 - 8.0      Protein, UA Negative Negative mg/dL    Glucose, Ur Negative Negative mg/dL    Ketones, Urine Negative Negative mg/dL    Bilirubin, Urine Negative Negative      Blood, Urine Small (A) Negative      Urobilinogen, Urine 0.1 0.1 - 1.0 EU/dL    Nitrite, Urine Negative Negative      Leukocyte Esterase, Urine Negative Negative      WBC, UA 0-4 0 - 4 /hpf    RBC, UA 10-20 0 - 5 /hpf    BACTERIA, URINE Negative Negative /hpf    Urine Culture if Indicated Culture not indicated by UA result Culture not indicated by UA result      Mucus, UA Negative Negative /lpf   COVID-19 & Influenza Combo    Collection Time: 05/08/24  1:31 AM    Specimen: Nasopharyngeal   Result Value Ref Range    SARS-CoV-2, PCR Not Detected Not Detected      Rapid Influenza A By PCR Not Detected Not Detected      Rapid Influenza B By PCR Not Detected Not Detected     CK    Collection Time: 05/08/24  1:31 AM   Result Value Ref Range    Total  39 - 308 U/L   Magnesium    Collection Time: 05/08/24  1:31 AM   Result Value Ref Range    Magnesium 2.6 (H) 1.6 - 2.4 mg/dL   Lipase    Collection Time: 05/08/24  1:31 AM   Result Value Ref Range    Lipase 167 (H) 13 - 75 U/L     Radiologic Studies:  XR CHEST PORTABLE   Final Result   Mild interstitial edema versus pneumonia.            Medications ordered:  Medications   vancomycin (VANCOCIN) 2,500 mg in sodium chloride 0.9 % 500 mL IVPB (2,500 mg IntraVENous New Bag 5/8/24 0222)   piperacillin-tazobactam (ZOSYN) 4,500 mg in sodium chloride 0.9 % 100 mL IVPB (mini-bag) (4,500 mg IntraVENous New Bag 5/8/24 0124)   sodium chloride 0.9 % bolus 2,328 mL (2,328 mLs IntraVENous New Bag 5/8/24 0127)   acetaminophen (TYLENOL) 160 MG/5ML solution 650 mg (650 mg PEG

## 2024-05-08 NOTE — PROGRESS NOTES
Attempted to call legal guardian Frankie Martinez to inquire if patient will be allowed blood transfusion if needed, no answer.

## 2024-05-08 NOTE — CARE COORDINATION
CM received call from Rick Roberts, Nurse Manager, at [facility]. He informed CM patient has been on palliative care with them for at least the past two years. He indicated palliative care for them is nothing invasive, however, IV fluids and IV abx are fine. CM notified attending provider.

## 2024-05-08 NOTE — CONSULTS
enoxaparin (LOVENOX) injection 40 mg, Daily  acetaminophen (TYLENOL) tablet 650 mg, Q6H PRN   Or  acetaminophen (TYLENOL) suppository 650 mg, Q6H PRN  lactated ringers IV soln infusion, Continuous  piperacillin-tazobactam (ZOSYN) 3,375 mg in sodium chloride 0.9 % 50 mL IVPB (mini-bag), Q8H  vancomycin (VANCOCIN) 1500 mg in sodium chloride 0.9% 500 mL IVPB, Q24H  vancomycin (VANCOCIN) intermittent dosing (placeholder), RX Placeholder  [START ON 5/9/2024] Vancomycin - Draw a level on THURS (5/9) @ 1800, Once  albuterol (PROVENTIL) nebulizer solution 2.5 mg, Q6H WA RT  albuterol sulfate HFA (PROVENTIL;VENTOLIN;PROAIR) 108 (90 Base) MCG/ACT inhaler 2 puff, Q4H PRN  diatrizoate meglumine-sodium (GASTROGRAFIN) 66-10 % solution 30 mL, ONCE PRN        Allergies   Patient has no known allergies.    Social History     Social History       Tobacco History       Smoking Status  Never      Smokeless Tobacco Use  Never              Alcohol History       Alcohol Use Status  Never              Drug Use       Drug Use Status  Never              Sexual Activity       Sexually Active  Not Asked                    Family History   History reviewed. No pertinent family history.    Review of Systems   Review of Systems   Unable to perform ROS: Mental status change        Physical Exam   /82   Pulse 82   Temp 99.3 °F (37.4 °C) (Axillary)   Resp 19   Ht 1.829 m (6')   Wt 117.9 kg (260 lb)   SpO2 96%   BMI 35.26 kg/m²      Physical Exam  Vitals and nursing note reviewed.   Constitutional:       General: He is not in acute distress.     Appearance: He is ill-appearing.   HENT:      Head: Normocephalic and atraumatic.      Right Ear: External ear normal.      Left Ear: External ear normal.      Nose: Nose normal.      Mouth/Throat:      Mouth: Mucous membranes are dry.      Comments: Edentulous, mouth breathing  Eyes:      Comments: Eyes closed    Cardiovascular:      Rate and Rhythm: Normal rate and regular rhythm.      Heart  1:4  Hepatitis C infection, status post Mavyret, undetectable viral load in 2022  History of latent tuberculosis, status post INH 2009  MRSA nasal colonization    Comment:  Vancomycin not unreasonable given MRSA nasal colonization, however, CXR not that impressive. Leukocytosis may be impacted by Prednisone.    Plan   1.  Continue IV Zosyn  2.  Discontinue Vancomycin for now  3.  Continue Truvada and Raltegravir  4.  Start nasal Mupirocin  5.  In am, check Procal, CRP, RPR, HCV RNA QT, HIV-1 RNA, CD4 count    Electronically signed by Onesimo Wells MD on 5/8/24 at 2:52 PM EDT

## 2024-05-08 NOTE — PROGRESS NOTES
Hospitalist Progress Note    NAME:   Bravo Martinez   : 1950   MRN: [id number]     Date/Time: 2024 10:49 AM  Patient PCP: Enrique Emanuel MD    Estimated discharge date:  Barriers:     Hospital Course:   Patient is a 73-year-old male with past medical history of dementia, hepatitis C, HIV, and schizophrenia who was admitted for sepsis with suspected pneumonia.  Labs revealed white blood cell count of 17.8 and lactic acid of 2.14. Blood cultures obtained show no growth so far patient was started on vancomycin and Zosyn for broad-spectrum coverage and concerns of aspiration pneumonia.  Chest x-ray revealed mild interstitial edema versus pneumonia. UA negative for UTI. Labs also indicate hypernatremia and hyperchloremia patient started on IV fluids.    Assessment / Plan:  Sepsis likely secondary to pneumonia  Patient experiencing cough and tachypnea  CXR revealed interstitial edema versus pneumonia  WBC 17.8  Continue vancomycin and Zosyn  Continue IV lactated ringer maintenance fluid  Continue nebulizer treatment PRN  Order CT chest     Dehydration  Hypernatremia  Hyperchloremia  Na 156, Cl 129  Cr 1.34  IV D5W bolus and monitor  Continuous lactated ringer for severe dehydration    Elevated lipase  CT abdomen/pelvis  Repeat lipase    Thrombocytopenia  Discontinue lovenox and monitor    HIV  Continue truvada, reltegravir     History of Hepatitis C  Current status unknown    GERD/PEG tube feedings  Continue lansoprazole    History of schizophrenia  Not currently on medication    Medical Decision Making     [] High (any 2)     A. Problems (any 1)  [] Acute/Chronic Illness/injury posing threat to life or bodily function:    [] Severe exacerbation of chronic illness:    ---------------------------------------------------------------------  B. Risk of Treatment (any 1)   [] Drugs/treatments that require intensive monitoring for toxicity include:    [] IV ABX requiring serial renal monitoring for nephrotoxicity:

## 2024-05-09 ENCOUNTER — APPOINTMENT (OUTPATIENT)
Facility: HOSPITAL | Age: 74
DRG: 890 | End: 2024-05-09
Payer: MEDICAID

## 2024-05-09 LAB
ALBUMIN SERPL-MCNC: 2.9 G/DL (ref 3.5–5)
ALBUMIN/GLOB SERPL: 0.7 (ref 1.1–2.2)
ALP SERPL-CCNC: 81 U/L (ref 45–117)
ALT SERPL-CCNC: 27 U/L (ref 12–78)
ANION GAP SERPL CALC-SCNC: 2 MMOL/L (ref 5–15)
AST SERPL W P-5'-P-CCNC: 37 U/L (ref 15–37)
BASOPHILS # BLD: 0 K/UL (ref 0–0.1)
BASOPHILS NFR BLD: 0 % (ref 0–1)
BILIRUB SERPL-MCNC: 0.9 MG/DL (ref 0.2–1)
BUN SERPL-MCNC: 15 MG/DL (ref 6–20)
BUN/CREAT SERPL: 14 (ref 12–20)
CA-I BLD-MCNC: 9 MG/DL (ref 8.5–10.1)
CHLORIDE SERPL-SCNC: 132 MMOL/L (ref 97–108)
CO2 SERPL-SCNC: 23 MMOL/L (ref 21–32)
CREAT SERPL-MCNC: 1.1 MG/DL (ref 0.7–1.3)
CRP SERPL-MCNC: 8.35 MG/DL (ref 0–0.3)
DIFFERENTIAL METHOD BLD: ABNORMAL
EOSINOPHIL # BLD: 0.2 K/UL (ref 0–0.4)
EOSINOPHIL NFR BLD: 2 % (ref 0–7)
ERYTHROCYTE [DISTWIDTH] IN BLOOD BY AUTOMATED COUNT: 13.2 % (ref 11.5–14.5)
GLOBULIN SER CALC-MCNC: 3.9 G/DL (ref 2–4)
GLUCOSE SERPL-MCNC: 97 MG/DL (ref 65–100)
HCT VFR BLD AUTO: 35.7 % (ref 36.6–50.3)
HGB BLD-MCNC: 11.4 G/DL (ref 12.1–17)
IMM GRANULOCYTES # BLD AUTO: 0 K/UL (ref 0–0.04)
IMM GRANULOCYTES NFR BLD AUTO: 0 % (ref 0–0.5)
LYMPHOCYTES # BLD: 0.7 K/UL (ref 0.8–3.5)
LYMPHOCYTES NFR BLD: 10 % (ref 12–49)
MCH RBC QN AUTO: 30.8 PG (ref 26–34)
MCHC RBC AUTO-ENTMCNC: 31.9 G/DL (ref 30–36.5)
MCV RBC AUTO: 96.5 FL (ref 80–99)
MONOCYTES # BLD: 0.3 K/UL (ref 0–1)
MONOCYTES NFR BLD: 4 % (ref 5–13)
NEUTS SEG # BLD: 5.8 K/UL (ref 1.8–8)
NEUTS SEG NFR BLD: 84 % (ref 32–75)
NRBC # BLD: 0 K/UL (ref 0–0.01)
NRBC BLD-RTO: 0 PER 100 WBC
PLATELET # BLD AUTO: 75 K/UL (ref 150–400)
PMV BLD AUTO: 12.7 FL (ref 8.9–12.9)
POTASSIUM SERPL-SCNC: 3.7 MMOL/L (ref 3.5–5.1)
PROCALCITONIN SERPL-MCNC: 0.54 NG/ML
PROT SERPL-MCNC: 6.8 G/DL (ref 6.4–8.2)
RBC # BLD AUTO: 3.7 M/UL (ref 4.1–5.7)
SODIUM SERPL-SCNC: 157 MMOL/L (ref 136–145)
WBC # BLD AUTO: 7 K/UL (ref 4.1–11.1)

## 2024-05-09 PROCEDURE — 87536 HIV-1 QUANT&REVRSE TRNSCRPJ: CPT

## 2024-05-09 PROCEDURE — 94640 AIRWAY INHALATION TREATMENT: CPT

## 2024-05-09 PROCEDURE — 99232 SBSQ HOSP IP/OBS MODERATE 35: CPT | Performed by: INTERNAL MEDICINE

## 2024-05-09 PROCEDURE — 6370000000 HC RX 637 (ALT 250 FOR IP): Performed by: INTERNAL MEDICINE

## 2024-05-09 PROCEDURE — 84145 PROCALCITONIN (PCT): CPT

## 2024-05-09 PROCEDURE — 36415 COLL VENOUS BLD VENIPUNCTURE: CPT

## 2024-05-09 PROCEDURE — 76770 US EXAM ABDO BACK WALL COMP: CPT

## 2024-05-09 PROCEDURE — 6360000002 HC RX W HCPCS: Performed by: STUDENT IN AN ORGANIZED HEALTH CARE EDUCATION/TRAINING PROGRAM

## 2024-05-09 PROCEDURE — 2580000003 HC RX 258: Performed by: HOSPITALIST

## 2024-05-09 PROCEDURE — 6370000000 HC RX 637 (ALT 250 FOR IP): Performed by: HOSPITALIST

## 2024-05-09 PROCEDURE — 86780 TREPONEMA PALLIDUM: CPT

## 2024-05-09 PROCEDURE — 85025 COMPLETE CBC W/AUTO DIFF WBC: CPT

## 2024-05-09 PROCEDURE — 86140 C-REACTIVE PROTEIN: CPT

## 2024-05-09 PROCEDURE — 1100000000 HC RM PRIVATE

## 2024-05-09 PROCEDURE — 2580000003 HC RX 258: Performed by: STUDENT IN AN ORGANIZED HEALTH CARE EDUCATION/TRAINING PROGRAM

## 2024-05-09 PROCEDURE — 86593 SYPHILIS TEST NON-TREP QUANT: CPT

## 2024-05-09 PROCEDURE — 71045 X-RAY EXAM CHEST 1 VIEW: CPT

## 2024-05-09 PROCEDURE — 80053 COMPREHEN METABOLIC PANEL: CPT

## 2024-05-09 PROCEDURE — 86592 SYPHILIS TEST NON-TREP QUAL: CPT

## 2024-05-09 PROCEDURE — 6360000002 HC RX W HCPCS: Performed by: HOSPITALIST

## 2024-05-09 PROCEDURE — 86361 T CELL ABSOLUTE COUNT: CPT

## 2024-05-09 PROCEDURE — 94761 N-INVAS EAR/PLS OXIMETRY MLT: CPT

## 2024-05-09 RX ORDER — DEXTROSE MONOHYDRATE 50 MG/ML
INJECTION, SOLUTION INTRAVENOUS CONTINUOUS
Status: DISCONTINUED | OUTPATIENT
Start: 2024-05-09 | End: 2024-05-11 | Stop reason: HOSPADM

## 2024-05-09 RX ADMIN — PIPERACILLIN AND TAZOBACTAM 3375 MG: 3; .375 INJECTION, POWDER, LYOPHILIZED, FOR SOLUTION INTRAVENOUS at 12:18

## 2024-05-09 RX ADMIN — EMTRICITABINE AND TENOFOVIR DISOPROXIL FUMARATE 1 TABLET: 200; 300 TABLET, FILM COATED ORAL at 12:18

## 2024-05-09 RX ADMIN — Medication 1000 MCG: at 12:18

## 2024-05-09 RX ADMIN — MUPIROCIN: 20 OINTMENT TOPICAL at 21:06

## 2024-05-09 RX ADMIN — LANSOPRAZOLE 30 MG: 30 TABLET, ORALLY DISINTEGRATING ORAL at 12:18

## 2024-05-09 RX ADMIN — DOCUSATE SODIUM 100 MG: 50 LIQUID ORAL at 21:06

## 2024-05-09 RX ADMIN — PREDNISONE 20 MG: 20 TABLET ORAL at 12:18

## 2024-05-09 RX ADMIN — MUPIROCIN: 20 OINTMENT TOPICAL at 12:18

## 2024-05-09 RX ADMIN — ALBUTEROL SULFATE 2.5 MG: 2.5 SOLUTION RESPIRATORY (INHALATION) at 21:54

## 2024-05-09 RX ADMIN — DEXTROSE MONOHYDRATE: 50 INJECTION, SOLUTION INTRAVENOUS at 12:34

## 2024-05-09 RX ADMIN — RALTEGRAVIR 400 MG: 400 TABLET, FILM COATED ORAL at 12:18

## 2024-05-09 RX ADMIN — RALTEGRAVIR 400 MG: 400 TABLET, FILM COATED ORAL at 21:08

## 2024-05-09 RX ADMIN — PIPERACILLIN AND TAZOBACTAM 3375 MG: 3; .375 INJECTION, POWDER, LYOPHILIZED, FOR SOLUTION INTRAVENOUS at 21:06

## 2024-05-09 RX ADMIN — SENNOSIDES 8.6 MG: 8.6 TABLET, FILM COATED ORAL at 21:06

## 2024-05-09 RX ADMIN — THIAMINE HCL TAB 100 MG 50 MG: 100 TAB at 12:18

## 2024-05-09 RX ADMIN — ALBUTEROL SULFATE 2.5 MG: 2.5 SOLUTION RESPIRATORY (INHALATION) at 08:45

## 2024-05-09 RX ADMIN — ALBUTEROL SULFATE 2.5 MG: 2.5 SOLUTION RESPIRATORY (INHALATION) at 14:19

## 2024-05-09 NOTE — CARE COORDINATION
Per rounds this morning, provider requested that CM contact [facility] regarding the pt's IV access in relation to his palliative care status at the facility. The pt will likely need a PICC for further access for fluids/abx, provider would like guidance from facility if this falls in line with their palliative model. If not, the pt will likely require hospice. CM has left multiple messages throughout the day for Hansel Roberts, nurse manager, no return call as of yet.

## 2024-05-09 NOTE — CONSULTS
Comprehensive Nutrition Assessment    Type and Reason for Visit:  Initial, Consult    Nutrition Recommendations/Plan:   Continue NPO  When appropriate, initiate Jevity 1.5 @ 20ml/h, advance by 20ml q4h to goal of 45ml/h + 175ml FWF q4h or per MD   + ProSource 2x/day (120 kcal, 30g pro)   At goal TF + ProSource provides 1740 kcal (98%), 99g pro (100%), 1871ml fluid (100%)  Monitor TF intake and tolerance, wt and bms      Malnutrition Assessment:  Malnutrition Status:  No malnutrition (05/09/24 1500)    Context:  Chronic Illness     Findings of the 6 clinical characteristics of malnutrition:  Energy Intake:  Unable to assess  Weight Loss:  Unable to assess     Body Fat Loss:  No significant body fat loss     Muscle Mass Loss:  No significant muscle mass loss    Fluid Accumulation:  No significant fluid accumulation     Strength:  Not Performed    Nutrition Assessment:    Pt admitted from facility for sepsis likely secondary to pneumonia with concerns of aspiration. Received consult for  TF management. RD to initiate regimen, recs above. Will follow per protocol. Meds: bowel reg, B1, B12, abx, D5 @ 75ml/h (provides ~306kcal/day). Labs: Na 152, Cl 122, Cr 131, Mg 2.6, .    Nutrition Related Findings:    NFPE deferred 2/2 contact precautions, no overt deficits noted. Pt + chronic PEG, no N/V/D/C documented, LBM 5/9. No edema noted. Wound Type: None       Current Nutrition Intake & Therapies:    Average Meal Intake: NPO  Average Supplements Intake: NPO  Diet NPO  ADULT TUBE FEEDING; PEG; Standard with Fiber; Continuous; 20; Yes; 20; Q 4 hours; 45; 175; Q 4 hours; Protein; 1 Dose; BID    Anthropometric Measures:  Height: 182.9 cm (6')  Ideal Body Weight (IBW): 178 lbs (81 kg)       Current Body Weight: 117.9 kg (259 lb 14.8 oz),   IBW. Weight Source: Not Specified  Current BMI (kg/m2): 35.2        Weight Adjustment For: Quadriplegia  Total Adjusted Percentage (Calculated): 12.5           Adjusted BMI (kg/m2)  (Calculated): 39.6  BMI Categories: Obese Class 2 (BMI 35.0 -39.9)    Estimated Daily Nutrient Needs:  Energy Requirements Based On: Formula  Weight Used for Energy Requirements: Current  Energy (kcal/day): 1782- 2025 kcal (22- 25 kcal/kg IBW)  Weight Used for Protein Requirements: Ideal  Protein (g/day): 97- 122g pro (1.2- 1.5g pro/kg IBW)  Method Used for Fluid Requirements: 1 ml/kcal  Fluid (ml/day): 1782- 2025ml (1ml/kcal or per MD)    Nutrition Diagnosis:   Inadequate oral intake related to cognitive or neurological impairment as evidenced by NPO or clear liquid status due to medical condition, nutrition support - enteral nutrition    Nutrition Interventions:   Food and/or Nutrient Delivery: Continue Current Diet, Start Tube Feeding  Nutrition Education/Counseling: No recommendation at this time  Coordination of Nutrition Care: Continue to monitor while inpatient       Goals:     Goals: Meet at least 75% of estimated needs, Initiate nutrition support, by next RD assessment       Nutrition Monitoring and Evaluation:   Behavioral-Environmental Outcomes: None Identified  Food/Nutrient Intake Outcomes: Enteral Nutrition Intake/Tolerance  Physical Signs/Symptoms Outcomes: Biochemical Data, GI Status, Weight    Discharge Planning:    Enteral Nutrition     Jie Lang RD  Contact: [phone]

## 2024-05-09 NOTE — PROGRESS NOTES
Progress Note  Date:2024       Room:[facility]  Patient Name:Bravo Martinez     YOB: 1950     Age:73 y.o.        Subjective    Subjective Patient with HIV infection, followed for suspected aspiration pneumonia on IV Zosyn. Afebrile with decreasing procal. Blood cultures negative so far. No sputum culture.  Patient appears to be resting comfortably but unresponsive.     Objective         Vitals Last 24 Hours:  TEMPERATURE:  Temp  Av.6 °F (36.4 °C)  Min: 97.3 °F (36.3 °C)  Max: 97.8 °F (36.6 °C)  RESPIRATIONS RANGE: Resp  Av.6  Min: 18  Max: 23  PULSE OXIMETRY RANGE: SpO2  Av %  Min: 95 %  Max: 98 %  PULSE RANGE: Pulse  Av.2  Min: 83  Max: 87  BLOOD PRESSURE RANGE: Systolic (24hrs), Av , Min:114 , Max:145   ; Diastolic (24hrs), Av, Min:67, Max:78         ObjectiveVitals and nursing note reviewed.   Constitutional:       General: He is not in acute distress.     Appearance: He is ill-appearing.   HENT:      Head: Normocephalic and atraumatic.      Right Ear: External ear normal.      Left Ear: External ear normal.      Nose: Nose normal.      Mouth/Throat:      Mouth: Mucous membranes are dry.      Comments: Edentulous, mouth breathing  Eyes:      Comments: Eyes closed    Cardiovascular:      Rate and Rhythm: Normal rate and regular rhythm.      Heart sounds: No murmur heard.  Pulmonary:      Effort: Pulmonary effort is normal.      Breath sounds: Rhonchi present. No wheezing or rales.   Abdominal:      General: Bowel sounds are normal. There is no distension.      Palpations: Abdomen is soft.      Tenderness: There is no abdominal tenderness.      Comments: PEG Tube   Genitourinary:     Comments: External urinary device  Musculoskeletal:      Cervical back: Neck supple.      Right lower leg: No edema.      Left lower leg: No edema.   Skin:     Findings: No rash.   Neurological:      Comments: Unable to assess   Psychiatric:      Comments: Unable to assess

## 2024-05-09 NOTE — PROGRESS NOTES
to quadriplegia, schizophrenia and intellectual disability.  Will have further discussion with [facility] as they would like no invasive messages such as procedures, central line or surgery.  If peripheral access is lost and unable to place PICC line or midline patient would likely need to be transition to hospice care.  Discussed with RN events overnight.    Objective:     VITALS:   Last 24hrs VS reviewed since prior progress note. Most recent are:  Patient Vitals for the past 24 hrs:   BP Temp Temp src Pulse Resp SpO2   05/09/24 0842 -- -- -- -- -- 95 %   05/09/24 0730 (!) 145/70 97.8 °F (36.6 °C) Axillary 86 23 95 %   05/08/24 2354 130/75 97.8 °F (36.6 °C) Axillary 85 22 95 %   05/08/24 2138 -- -- -- 85 18 95 %   05/08/24 1945 126/78 97.3 °F (36.3 °C) Axillary 83 22 98 %   05/08/24 1533 114/67 97.4 °F (36.3 °C) Oral 87 18 98 %   05/08/24 1322 -- -- -- 82 19 96 %           Intake/Output Summary (Last 24 hours) at 5/9/2024 1206  Last data filed at 5/9/2024 0700  Gross per 24 hour   Intake 2861.61 ml   Output 1300 ml   Net 1561.61 ml        I had a face to face encounter and independently examined this patient on 5/9/2024, as outlined below:    Review of Systems   Unable to perform ROS: Patient nonverbal        PHYSICAL EXAM:  Physical Exam  Constitutional:       General: He is not in acute distress.     Appearance: He is ill-appearing.   HENT:      Head: Normocephalic and atraumatic.   Eyes:      Extraocular Movements: Extraocular movements intact.   Neck:      Comments: Mild stiffness in extension, supple with no JVD  Cardiovascular:      Rate and Rhythm: Normal rate and regular rhythm.      Heart sounds: Normal heart sounds.   Pulmonary:      Breath sounds: Rhonchi present.      Comments: Coarse breath sounds bilaterally  Abdominal:      Palpations: Abdomen is soft.      Tenderness: There is no abdominal tenderness.      Comments: PEG tube present site clean dry and intact   Musculoskeletal:      Right lower  leg: No edema.      Left lower leg: No edema.      Comments: Stiff increased tone of muscles with decreased range of motion and flexion contractures in upper extremities   Skin:     General: Skin is warm and dry.   Neurological:      Mental Status: Mental status is at baseline.      Comments: History of schizophrenia and intellectual disability unable to assess         Reviewed most current lab test results and cultures  YES  Reviewed most current radiology test results   YES  Review and summation of old records today    NO  Reviewed patient's current orders and MAR    YES  PMH/ reviewed - no change compared to H&P  ________________________________________________________________________  Care Plan discussed with:    Comments   Patient x    Family      RN x    Care Manager     Consultant                        Multidiciplinary team rounds were held today with , nursing, pharmacist and clinical coordinator.  Patient's plan of care was discussed; medications were reviewed and discharge planning was addressed.     ________________________________________________________________________  Total NON critical care TIME:  35  Minutes    Total CRITICAL CARE TIME Spent:   Minutes non procedure based      Comments   >50% of visit spent in counseling and coordination of care     ________________________________________________________________________  Lambert Whaley PA-C     Procedures: see electronic medical records for all procedures/Xrays and details which were not copied into this note but were reviewed prior to creation of Plan.      LABS:  I reviewed today's most current labs and imaging studies.  Pertinent labs include:  Recent Labs     05/08/24  0131 05/08/24  0645 05/09/24  0655   WBC 8.4 17.8* 7.0   HGB 17.1* 13.4 11.4*   HCT 53.1* 42.5 35.7*   * 104* 75*       Recent Labs     05/08/24  0131 05/08/24  0645 05/09/24  0655   * 156* 157*   K 4.5 3.9 3.7   * 129* 132*   CO2 28 24 23   BUN

## 2024-05-09 NOTE — PROGRESS NOTES
PULMONARY NOTE  [facility]    Name: Bravo Martinez MRN: [id number]   : 1950 Hospital: [facility]   Date: 2024  Admission date: 2024 Hospital Day: 2       HPI:     Patient Active Problem List   Diagnosis    Pneumonia    SOB (shortness of breath)    Sepsis (HCC)             [x] High complexity decision making was performed  [x] See my orders for details      Subjective/Initial History:     I was asked by Uzma Lua MD to see Bravo Martinez  a 73 y.o.   male in consultation     Excerpts from admission 2024 or consult notes as follows:   73-year-old male came in because of fever tachycardia congestive cough he has intellectual disability HIV quadriplegic bedbound patient has G-tube feeding unable to get any history out of the patient.  He had a temperature 104 admitted for aspiration pneumonia.      No Known Allergies     MAR reviewed and pertinent medications noted or modified as needed     Current Facility-Administered Medications   Medication Dose Route Frequency Provider Last Rate Last Admin    acetaminophen (TYLENOL) 160 MG/5ML solution 325 mg  325 mg Oral Q6H PRN Uzma Lua MD        vitamin B-12 (CYANOCOBALAMIN) tablet 1,000 mcg  1,000 mcg Oral Daily Uzma Lua MD   1,000 mcg at 24 1053    docusate (COLACE) 50 MG/5ML liquid 100 mg  100 mg Per G Tube BID Uzma Lua MD   100 mg at 246    emtricitabine-tenofovir (TRUVADA) 200-300 MG per tablet 1 tablet  1 tablet Oral Daily Uzma Lua MD        [START ON 5/10/2024] fentaNYL (DURAGESIC) 25 MCG/HR 1 patch  1 patch TransDERmal Q72H Uzma Lua MD        predniSONE (DELTASONE) tablet 20 mg  20 mg Oral Daily Uzma Lua MD   20 mg at 24 1053    raltegravir (ISENTRESS) tablet 400 mg  400 mg Oral BID Uzma Lua MD   400 mg at 24 2200    senna (SENOKOT) tablet 8.6 mg  1 tablet Per G Tube Nightly  Use Authorization (EUA) only.   Fact sheet for Patients: https://www.fda.gov/media/563796/download Fact sheet   for Healthcare Providers: https://www.fda.fov/media/956738/download                  Imaging:  XR ABDOMEN (KUB) (SINGLE AP VIEW)    Result Date: 5/8/2024  EXAM:  XR ABDOMEN (KUB) (SINGLE AP VIEW) INDICATION: PEG tube COMPARISON: None. TECHNIQUE: Supine frontal abdomen (KUB). FINDINGS: Contrast instilled through the indwelling PEG tube demonstrates free flow into the proximal normal-appearing jejunum..     Functioning PEG tube     XR CHEST PORTABLE    Result Date: 5/8/2024  EXAM:  XR CHEST PORTABLE Clinical history: Sepsis INDICATION:   Sepsis COMPARISON: None FINDINGS: AP portable upright view of the chest demonstrates an normal cardiopericardial silhouette.There is associated increased interstitial opacity.There is no pneumothorax. Patient is on a cardiac monitor.     Mild interstitial edema versus pneumonia.        ARTERIAL BLOOD GAS  No results for input(s): \"PHART\", \"FBU7RJG\", \"PO2ART\", \"NYP0GLI\", \"BEART\", \"VYD1XGLB\", \"HGBART\", \"FJ4WNHETI\", \"FIO2A\", \"M0OVCCFH\", \"OXYHEM\", \"CARBOXHGBART\", \"METHGBART\", \"V2MELWPDN\", \"PHCORART\", \"TEMP\" in the last 72 hours.    Invalid input(s): \"NPK5BQRQD\"  No results found for this or any previous visit.    IMPRESSION:   Aspiration pneumonia  Acute kidney injury  Gastroesophageal reflux disease  HIV history of hepatitis C  Quadriplegic  Thrombocytopenia  Intellectual disability and history of schizophrenia      RECOMMENDATIONS/PLAN:     73-year-old male came in because of fever most likely aspirated  Chest x-ray shows infiltrate some congestive changes will repeat chest x-ray in a.m.  Follow culture results on Zosyn and vancomycin will start on Bactroban once Gram stain culture comes back will DC vancomycin  Deep suctioning discussed with the PCT at bedside  Continue with HIV meds         Giovanni Chandler MD

## 2024-05-09 NOTE — PROGRESS NOTES
Gastroenterology Progress Note        Patient: Bravo Martinez MRN: [id number]  SSN: [id number]    YOB: 1950  Age: 73 y.o.  Sex: male      Admit Date: 5/8/2024    LOS: 1 day     Subjective:     Patient seen, no nausea vomiting, t,  Past Medical History:   Diagnosis Date    Bed confinement status     Dementia (HCC)     Hepatitis C     HIV (human immunodeficiency virus infection) (HCC)     Schizophrenia (HCC)         Current Facility-Administered Medications:     acetaminophen (TYLENOL) 160 MG/5ML solution 325 mg, 325 mg, Oral, Q6H PRN, Uzma Lua MD    vitamin B-12 (CYANOCOBALAMIN) tablet 1,000 mcg, 1,000 mcg, Oral, Daily, Uzma Lua MD, 1,000 mcg at 05/08/24 1053    docusate (COLACE) 50 MG/5ML liquid 100 mg, 100 mg, Per G Tube, BID, Uzma Lua MD, 100 mg at 05/08/24 2136    emtricitabine-tenofovir (TRUVADA) 200-300 MG per tablet 1 tablet, 1 tablet, Oral, Daily, Uzma Lua MD    [START ON 5/10/2024] fentaNYL (DURAGESIC) 25 MCG/HR 1 patch, 1 patch, TransDERmal, Q72H, Uzma Lua MD    predniSONE (DELTASONE) tablet 20 mg, 20 mg, Oral, Daily, Uzma Lua MD, 20 mg at 05/08/24 1053    raltegravir (ISENTRESS) tablet 400 mg, 400 mg, Oral, BID, Uzma Lua MD, 400 mg at 05/08/24 2200    senna (SENOKOT) tablet 8.6 mg, 1 tablet, Per G Tube, Nightly, Uzma Lua MD, 8.6 mg at 05/08/24 2134    thiamine mononitrate tablet 50 mg, 50 mg, Oral, Daily, Uzma Lua MD, 50 mg at 05/08/24 1053    lansoprazole (PREVACID SOLUTAB) disintegrating tablet 30 mg, 30 mg, Per G Tube, [facility] AC, Uzma Lua MD, 30 mg at 05/08/24 1053    [Held by provider] enoxaparin (LOVENOX) injection 40 mg, 40 mg, SubCUTAneous, Daily, Uzma Lua MD, 40 mg at 05/08/24 1053    acetaminophen (TYLENOL) tablet 650 mg, 650 mg, Oral, Q6H PRN **OR** acetaminophen (TYLENOL) suppository 650 mg, 650 mg, Rectal, Q6H PRN, Chanell,  mmol/L    CO2 23 21 - 32 mmol/L    Anion Gap 2 (L) 5 - 15 mmol/L    Glucose 97 65 - 100 mg/dL    BUN 15 6 - 20 mg/dL    Creatinine 1.10 0.70 - 1.30 mg/dL    Bun/Cre Ratio 14 12 - 20      Est, Glom Filt Rate 72 >60 ml/min/1.73m2    Calcium 9.0 8.5 - 10.1 mg/dL    Total Bilirubin 0.9 0.2 - 1.0 mg/dL    AST 37 15 - 37 U/L    ALT 27 12 - 78 U/L    Alk Phosphatase 81 45 - 117 U/L    Total Protein 6.8 6.4 - 8.2 g/dL    Albumin 2.9 (L) 3.5 - 5.0 g/dL    Globulin 3.9 2.0 - 4.0 g/dL    Albumin/Globulin Ratio 0.7 (L) 1.1 - 2.2     Procalcitonin    Collection Time: 05/09/24  6:55 AM   Result Value Ref Range    Procalcitonin 0.54 (H) 0 ng/mL   CBC with Auto Differential    Collection Time: 05/09/24  6:55 AM   Result Value Ref Range    WBC 7.0 4.1 - 11.1 K/uL    RBC 3.70 (L) 4.10 - 5.70 M/uL    Hemoglobin 11.4 (L) 12.1 - 17.0 g/dL    Hematocrit 35.7 (L) 36.6 - 50.3 %    MCV 96.5 80.0 - 99.0 FL    MCH 30.8 26.0 - 34.0 PG    MCHC 31.9 30.0 - 36.5 g/dL    RDW 13.2 11.5 - 14.5 %    Platelets 75 (L) 150 - 400 K/uL    MPV 12.7 8.9 - 12.9 FL    Nucleated RBCs 0.0 0.0  WBC    nRBC 0.00 0.00 - 0.01 K/uL    Neutrophils % 84 (H) 32 - 75 %    Lymphocytes % 10 (L) 12 - 49 %    Monocytes % 4 (L) 5 - 13 %    Eosinophils % 2 0 - 7 %    Basophils % 0 0 - 1 %    Immature Granulocytes % 0 0 - 0.5 %    Neutrophils Absolute 5.8 1.8 - 8.0 K/UL    Lymphocytes Absolute 0.7 (L) 0.8 - 3.5 K/UL    Monocytes Absolute 0.3 0.0 - 1.0 K/UL    Eosinophils Absolute 0.2 0.0 - 0.4 K/UL    Basophils Absolute 0.0 0.0 - 0.1 K/UL    Immature Granulocytes Absolute 0.0 0.00 - 0.04 K/UL    Differential Type AUTOMATED     C-Reactive Protein    Collection Time: 05/09/24  6:55 AM   Result Value Ref Range    CRP 8.35 (H) 0.00 - 0.30 mg/dL        XR ABDOMEN (KUB) (SINGLE AP VIEW)   Final Result   Functioning PEG tube         XR CHEST PORTABLE   Final Result   Mild interstitial edema versus pneumonia.          XR CHEST PORTABLE    (Results Pending)

## 2024-05-09 NOTE — CONSULTS
Renal Consultation Note    NAME:  Bravo Martinez   :   1950   MRN:   [id number]     ATTENDING: Uzma Lua MD  PCP:  Enrique Emanuel MD    Date/Time:  2024 3:26 PM      Subjective:   REQUESTING PHYSICIAN:  REASON FOR CONSULT:    Hypernatremia    Patient is a 73-year-old Afro-American female with history of quadriplegia with flexion contractures, intellectual disability from [facility], who was admitted yesterday for sepsis likely because of pneumonia with possible concerns of aspiration.  He was started on vancomycin and Zosyn.  His chest x-ray showed mild interstitial edema versus pneumonia.  His sodium was elevated at 157 today which prompted a renal consultation.  It was 152 at the time of admission.  Patient has a PEG tube in place.  Patient seen in the ER.  He is unable to provide any meaningful history though he claims he has been having diarrhea but not sure what good historian he is..  His home medications did not include any diuretics  He has been started on D5W at 75 mL/h.  Patient clinically seems volume depleted    Blood pressure was low normal yesterday though well-controlled today    Past Medical History:   Diagnosis Date    Bed confinement status     Dementia (HCC)     Hepatitis C     HIV (human immunodeficiency virus infection) (HCC)     Schizophrenia (HCC)       History reviewed. No pertinent surgical history.  Social History     Tobacco Use    Smoking status: Never    Smokeless tobacco: Never   Substance Use Topics    Alcohol use: Never      History reviewed. No pertinent family history.    No Known Allergies   Prior to Admission medications    Medication Sig Start Date End Date Taking? Authorizing Provider   acetaminophen (TYLENOL) 160 MG/5ML solution 15 mg/kg by Enteral route every 6 hours as needed    Automatic Reconciliation, Ar   albuterol (PROVENTIL) (2.5 MG/3ML) 0.083% nebulizer solution Inhale 3 mLs into the lungs    Automatic Reconciliation, Ar    cyanocobalamin 1000 MCG tablet Take 1 tablet by mouth daily    Automatic Reconciliation, Ar   docusate sodium (COLACE) 150 MG/15ML liquid 10 mLs by Enteral route 2 times daily    Automatic Reconciliation, Ar   emtricitabine-tenofovir (TRUVADA) 200-300 MG per tablet Take 1 tablet by mouth daily    Automatic Reconciliation, Ar   fentaNYL (DURAGESIC) 25 MCG/HR Place 1 patch onto the skin every 72 hours.    Automatic Reconciliation, Ar   lansoprazole (PREVACID) 30 MG delayed release capsule 1 capsule by Enteral route every morning (before breakfast)    Automatic Reconciliation, Ar   predniSONE (DELTASONE) 20 MG tablet Take 1 tablet by mouth daily  Patient not taking: Reported on 2024    Automatic Reconciliation, Ar   raltegravir (ISENTRESS) 400 MG tablet Take 1 tablet by mouth 2 times daily    Automatic Reconciliation, Ar   senna (SENOKOT) 8.6 MG tablet 1 tablet by Enteral route    Automatic Reconciliation, Ar   thiamine 50 MG tablet Take 1 tablet by mouth daily  Patient not taking: Reported on 2024    Automatic Reconciliation, Ar   thiamine 100 MG tablet Take 0.5 tablets by mouth daily  Patient not taking: Reported on 2024   Automatic Reconciliation, Ar       REVIEW OF SYSTEMS:       Unable to obtain any because of patient's mentation      Objective:   VITALS:    BP (!) 145/70   Pulse 87   Temp 97.8 °F (36.6 °C) (Axillary)   Resp 19   Ht 1.829 m (6')   Wt 117.9 kg (260 lb)   SpO2 96%   BMI 35.26 kg/m²   Temp (24hrs), Av.6 °F (36.4 °C), Min:97.3 °F (36.3 °C), Max:97.8 °F (36.6 °C)      PHYSICAL EXAM:     General: NAD, alert, confused but shakes his head in response  Eyes: sclera anicteric  Oral Cavity: No thrush or ulcers  Neck: no JVD  Chest: Fair bilateral air entry.No Wheezing or Rhonchi. No rales.  Heart: normal sounds  Abdomen: soft and non tender.  PEG tube in place  : no huntley  Lower Extremities: Trace bilateral edema.  Contracted skin: no rash  Neuro: Awake  Psychiatric: Unable

## 2024-05-10 LAB
25(OH)D3 SERPL-MCNC: 29.8 NG/ML (ref 30–100)
ALBUMIN SERPL-MCNC: 3.1 G/DL (ref 3.5–5)
ALBUMIN/GLOB SERPL: 0.7 (ref 1.1–2.2)
ALP SERPL-CCNC: 84 U/L (ref 45–117)
ALT SERPL-CCNC: 33 U/L (ref 12–78)
ANION GAP SERPL CALC-SCNC: 5 MMOL/L (ref 5–15)
AST SERPL W P-5'-P-CCNC: 39 U/L (ref 15–37)
BASOPHILS # BLD: 0 K/UL (ref 0–0.1)
BASOPHILS NFR BLD: 0 % (ref 0–1)
BILIRUB SERPL-MCNC: 0.5 MG/DL (ref 0.2–1)
BUN SERPL-MCNC: 10 MG/DL (ref 6–20)
BUN/CREAT SERPL: 9 (ref 12–20)
CA-I BLD-MCNC: 8.9 MG/DL (ref 8.5–10.1)
CA-I BLD-MCNC: 9 MG/DL (ref 8.5–10.1)
CHLORIDE SERPL-SCNC: 124 MMOL/L (ref 97–108)
CO2 SERPL-SCNC: 23 MMOL/L (ref 21–32)
CREAT SERPL-MCNC: 1.06 MG/DL (ref 0.7–1.3)
CRP SERPL-MCNC: 2.91 MG/DL (ref 0–0.3)
DIFFERENTIAL METHOD BLD: ABNORMAL
EOSINOPHIL # BLD: 0 K/UL (ref 0–0.4)
EOSINOPHIL NFR BLD: 1 % (ref 0–7)
ERYTHROCYTE [DISTWIDTH] IN BLOOD BY AUTOMATED COUNT: 13.1 % (ref 11.5–14.5)
GLOBULIN SER CALC-MCNC: 4.5 G/DL (ref 2–4)
GLUCOSE SERPL-MCNC: 117 MG/DL (ref 65–100)
HCT VFR BLD AUTO: 37.6 % (ref 36.6–50.3)
HGB BLD-MCNC: 12.4 G/DL (ref 12.1–17)
HIV1 RNA # SERPL NAA+PROBE: <20 COPIES/ML
HIV1 RNA SERPL NAA+PROBE-LOG#: NORMAL LOG10COPY/ML
IMM GRANULOCYTES # BLD AUTO: 0 K/UL (ref 0–0.04)
IMM GRANULOCYTES NFR BLD AUTO: 0 % (ref 0–0.5)
LYMPHOCYTES # BLD: 0.8 K/UL (ref 0.8–3.5)
LYMPHOCYTES NFR BLD: 16 % (ref 12–49)
MCH RBC QN AUTO: 30.6 PG (ref 26–34)
MCHC RBC AUTO-ENTMCNC: 33 G/DL (ref 30–36.5)
MCV RBC AUTO: 92.8 FL (ref 80–99)
MONOCYTES # BLD: 0.1 K/UL (ref 0–1)
MONOCYTES NFR BLD: 3 % (ref 5–13)
NEUTS SEG # BLD: 3.7 K/UL (ref 1.8–8)
NEUTS SEG NFR BLD: 80 % (ref 32–75)
NRBC # BLD: 0 K/UL (ref 0–0.01)
NRBC BLD-RTO: 0 PER 100 WBC
PLATELET # BLD AUTO: 101 K/UL (ref 150–400)
PMV BLD AUTO: 11.6 FL (ref 8.9–12.9)
POTASSIUM SERPL-SCNC: 3.3 MMOL/L (ref 3.5–5.1)
PROT SERPL-MCNC: 7.6 G/DL (ref 6.4–8.2)
PTH-INTACT SERPL-MCNC: 68.4 PG/ML (ref 18.4–88)
RBC # BLD AUTO: 4.05 M/UL (ref 4.1–5.7)
SODIUM SERPL-SCNC: 152 MMOL/L (ref 136–145)
WBC # BLD AUTO: 4.6 K/UL (ref 4.1–11.1)

## 2024-05-10 PROCEDURE — 83970 ASSAY OF PARATHORMONE: CPT

## 2024-05-10 PROCEDURE — 2580000003 HC RX 258: Performed by: STUDENT IN AN ORGANIZED HEALTH CARE EDUCATION/TRAINING PROGRAM

## 2024-05-10 PROCEDURE — 2580000003 HC RX 258: Performed by: HOSPITALIST

## 2024-05-10 PROCEDURE — 6360000002 HC RX W HCPCS: Performed by: HOSPITALIST

## 2024-05-10 PROCEDURE — 36415 COLL VENOUS BLD VENIPUNCTURE: CPT

## 2024-05-10 PROCEDURE — 82306 VITAMIN D 25 HYDROXY: CPT

## 2024-05-10 PROCEDURE — 80053 COMPREHEN METABOLIC PANEL: CPT

## 2024-05-10 PROCEDURE — 94640 AIRWAY INHALATION TREATMENT: CPT

## 2024-05-10 PROCEDURE — 86361 T CELL ABSOLUTE COUNT: CPT

## 2024-05-10 PROCEDURE — 86592 SYPHILIS TEST NON-TREP QUAL: CPT

## 2024-05-10 PROCEDURE — 6370000000 HC RX 637 (ALT 250 FOR IP): Performed by: HOSPITALIST

## 2024-05-10 PROCEDURE — 86140 C-REACTIVE PROTEIN: CPT

## 2024-05-10 PROCEDURE — 99232 SBSQ HOSP IP/OBS MODERATE 35: CPT | Performed by: INTERNAL MEDICINE

## 2024-05-10 PROCEDURE — 6370000000 HC RX 637 (ALT 250 FOR IP): Performed by: INTERNAL MEDICINE

## 2024-05-10 PROCEDURE — 85025 COMPLETE CBC W/AUTO DIFF WBC: CPT

## 2024-05-10 PROCEDURE — 86780 TREPONEMA PALLIDUM: CPT

## 2024-05-10 PROCEDURE — 1100000000 HC RM PRIVATE

## 2024-05-10 PROCEDURE — 86593 SYPHILIS TEST NON-TREP QUANT: CPT

## 2024-05-10 PROCEDURE — 87536 HIV-1 QUANT&REVRSE TRNSCRPJ: CPT

## 2024-05-10 PROCEDURE — 6360000002 HC RX W HCPCS: Performed by: STUDENT IN AN ORGANIZED HEALTH CARE EDUCATION/TRAINING PROGRAM

## 2024-05-10 PROCEDURE — 6370000000 HC RX 637 (ALT 250 FOR IP)

## 2024-05-10 RX ADMIN — PIPERACILLIN AND TAZOBACTAM 3375 MG: 3; .375 INJECTION, POWDER, LYOPHILIZED, FOR SOLUTION INTRAVENOUS at 05:30

## 2024-05-10 RX ADMIN — POTASSIUM BICARBONATE 40 MEQ: 782 TABLET, EFFERVESCENT ORAL at 14:54

## 2024-05-10 RX ADMIN — LANSOPRAZOLE 30 MG: 30 TABLET, ORALLY DISINTEGRATING ORAL at 08:51

## 2024-05-10 RX ADMIN — PREDNISONE 20 MG: 20 TABLET ORAL at 08:51

## 2024-05-10 RX ADMIN — RALTEGRAVIR 400 MG: 400 TABLET, FILM COATED ORAL at 08:50

## 2024-05-10 RX ADMIN — THIAMINE HCL TAB 100 MG 50 MG: 100 TAB at 08:50

## 2024-05-10 RX ADMIN — ALBUTEROL SULFATE 2.5 MG: 2.5 SOLUTION RESPIRATORY (INHALATION) at 08:39

## 2024-05-10 RX ADMIN — PIPERACILLIN AND TAZOBACTAM 3375 MG: 3; .375 INJECTION, POWDER, LYOPHILIZED, FOR SOLUTION INTRAVENOUS at 12:09

## 2024-05-10 RX ADMIN — MUPIROCIN: 20 OINTMENT TOPICAL at 22:13

## 2024-05-10 RX ADMIN — MUPIROCIN: 20 OINTMENT TOPICAL at 08:51

## 2024-05-10 RX ADMIN — DEXTROSE MONOHYDRATE: 50 INJECTION, SOLUTION INTRAVENOUS at 02:00

## 2024-05-10 RX ADMIN — ALBUTEROL SULFATE 2.5 MG: 2.5 SOLUTION RESPIRATORY (INHALATION) at 14:14

## 2024-05-10 RX ADMIN — RALTEGRAVIR 400 MG: 400 TABLET, FILM COATED ORAL at 21:00

## 2024-05-10 RX ADMIN — EMTRICITABINE AND TENOFOVIR DISOPROXIL FUMARATE 1 TABLET: 200; 300 TABLET, FILM COATED ORAL at 08:50

## 2024-05-10 RX ADMIN — ALBUTEROL SULFATE 2.5 MG: 2.5 SOLUTION RESPIRATORY (INHALATION) at 22:13

## 2024-05-10 RX ADMIN — PIPERACILLIN AND TAZOBACTAM 3375 MG: 3; .375 INJECTION, POWDER, LYOPHILIZED, FOR SOLUTION INTRAVENOUS at 21:30

## 2024-05-10 RX ADMIN — Medication 1000 MCG: at 08:50

## 2024-05-10 NOTE — CARE COORDINATION
Transition of Care Plan:    RUR: 13%  Prior Level of Functioning: DEP/Total care  Disposition: SNF LTC  If SNF or IPR: Date FOC offered: NA  Date FOC received: NA  Accepting facility: [facility]  Date authorization started with reference number: NA  Date authorization received and expires: NA  Follow up appointments: NA  DME needed: NO  Transportation at discharge: YES, STRETCHER  IM/IMM Medicare/ letter given: ATTEMPTED TO CONTACT NOK  Is patient a [place] and connected with [facility]? NO   If yes, was [place] transfer form completed and [facility] notified?   Caregiver Contact: YES  Discharge Caregiver contacted prior to discharge? YES  Care Conference needed? NO  Barriers to discharge: NONE        Pt to dc tomorrow morning to [facility] at the request of Dr. Emanuel from the facility. Transport set for 0900. DC summary will need to be faxed to admissions via care[facility] and to the receiving unit at [phone]. Report can be called to [phone]. If any issues arise, the weekend nursing supervisor can be contacted at [phone]. CM uploaded most recent hospitalist note and MAR. CM attempted to contact pt's legal guardian for update and IMM, unable to reach, LVM.

## 2024-05-10 NOTE — PROGRESS NOTES
400 mg  400 mg Oral BID Uzma Lua MD   400 mg at 05/10/24 0850    senna (SENOKOT) tablet 8.6 mg  1 tablet Per G Tube Nightly Uzma Lua MD   8.6 mg at 05/09/24 2106    thiamine mononitrate tablet 50 mg  50 mg Oral Daily Uzma Lua MD   50 mg at 05/10/24 0850    lansoprazole (PREVACID SOLUTAB) disintegrating tablet 30 mg  30 mg Per G Tube QAM AC Uzma Lua MD   30 mg at 05/10/24 0851    [Held by provider] enoxaparin (LOVENOX) injection 40 mg  40 mg SubCUTAneous Daily Uzma Lua MD   40 mg at 05/08/24 1053    acetaminophen (TYLENOL) tablet 650 mg  650 mg Oral Q6H PRN Uzma Lua MD        Or    acetaminophen (TYLENOL) suppository 650 mg  650 mg Rectal Q6H PRN Uzma Lua MD        piperacillin-tazobactam (ZOSYN) 3,375 mg in sodium chloride 0.9 % 50 mL IVPB (mini-bag)  3,375 mg IntraVENous Q8H Uzma Lua MD   Stopped at 05/10/24 0940    albuterol (PROVENTIL) nebulizer solution 2.5 mg  2.5 mg Nebulization Q6H [facility] Lambert Whaley PA-C   2.5 mg at 05/10/24 0839    albuterol sulfate HFA (PROVENTIL;VENTOLIN;PROAIR) 108 (90 Base) MCG/ACT inhaler 2 puff  2 puff Inhalation Q4H PRN Jason Vázquez MD        diatrizoate meglumine-sodium (GASTROGRAFIN) 66-10 % solution 30 mL  30 mL PEG Tube ONCE PRN Jason Vázquez MD   30 mL at 05/08/24 1431    mupirocin (BACTROBAN) 2 % ointment   Each Nostril BID Giovanni Chandler MD   Given at 05/10/24 0851      Patient PCP: Enrique Emanuel MD  PMH:  has a past medical history of Bed confinement status, Dementia (HCC), Hepatitis C, HIV (human immunodeficiency virus infection) (HCC), and Schizophrenia (HCC).  PSH:   has no past surgical history on file.   FHX: family history is not on file.   SHX:  reports that he has never smoked. He has never used smokeless tobacco. He reports that he does not drink alcohol and does not use drugs.     ROS:    Unable to obtain review of system    2.6*  --   --   --    BILITOT 0.8  --  0.9 0.9   AST 34  --  24 37   ALT 36  --  27 27   LIPASE 167*  --   --   --        No results for input(s): \"PH\", \"PCO2\", \"PO2\", \"HCO3\", \"FIO2\" in the last 72 hours.  Recent Labs     05/08/24 0131   CKTOTAL 148       Lab Results   Component Value Date/Time     10/28/2020 09:55 PM      No results found for: \"TSH\"    Results       Procedure Component Value Units Date/Time    Culture, Respiratory [[id number]]     Order Status: Sent Specimen: Sputum, Suctioned     MRSA by PCR [[id number]]  (Abnormal) Collected: 05/08/24 1100    Order Status: Completed Specimen: Swab Updated: 05/08/24 1316     MRSA by PCR DETECTED        Comment: Results verified, phoned to and read back by BRADEN HOLLINGSWORTH RN 13:16 05/08/24 BY LBO       Blood Culture 1 [[id number]] Collected: 05/08/24 0131    Order Status: Completed Specimen: Blood Updated: 05/09/24 0728     Special Requests No Special Requests        Culture No growth 1 day       Blood Culture 2 [[id number]] Collected: 05/08/24 0131    Order Status: Completed Specimen: Blood Updated: 05/09/24 0728     Special Requests No Special Requests        Culture No growth 1 day       COVID-19 & Influenza Combo [[id number]] Collected: 05/08/24 0131    Order Status: Completed Specimen: Nasopharyngeal Updated: 05/08/24 0312     SARS-CoV-2, PCR Not Detected        Comment: Not Detected results do not preclude SARS-CoV-2 infection and should not be used as the sole basis for patient management decisions. Results must be combined with clinical observations, patient history, and epidemiological information        Rapid Influenza A By PCR Not Detected        Rapid Influenza B By PCR Not Detected        Comment: Testing was performed using tommie Deya SARS-CoV-2 and Influenza A/B nucleic acid assay. This test is a multiplex Real-Time Reverse Transcriptase Polymerase Chain Reaction (RT-PCR) based in vitro diagnostic test intended for the qualitative detection of

## 2024-05-10 NOTE — PROGRESS NOTES
CO2 23 21 - 32 mmol/L    Anion Gap 5 5 - 15 mmol/L    Glucose 117 (H) 65 - 100 mg/dL    BUN 10 6 - 20 mg/dL    Creatinine 1.06 0.70 - 1.30 mg/dL    Bun/Cre Ratio 9 (L) 12 - 20      Est, Glom Filt Rate 75 >60 ml/min/1.73m2    Calcium 9.0 8.5 - 10.1 mg/dL    Total Bilirubin 0.5 0.2 - 1.0 mg/dL    AST 39 (H) 15 - 37 U/L    ALT 33 12 - 78 U/L    Alk Phosphatase 84 45 - 117 U/L    Total Protein 7.6 6.4 - 8.2 g/dL    Albumin 3.1 (L) 3.5 - 5.0 g/dL    Globulin 4.5 (H) 2.0 - 4.0 g/dL    Albumin/Globulin Ratio 0.7 (L) 1.1 - 2.2     CBC with Auto Differential    Collection Time: 05/10/24 12:34 PM   Result Value Ref Range    WBC 4.6 4.1 - 11.1 K/uL    RBC 4.05 (L) 4.10 - 5.70 M/uL    Hemoglobin 12.4 12.1 - 17.0 g/dL    Hematocrit 37.6 36.6 - 50.3 %    MCV 92.8 80.0 - 99.0 FL    MCH 30.6 26.0 - 34.0 PG    MCHC 33.0 30.0 - 36.5 g/dL    RDW 13.1 11.5 - 14.5 %    Platelets 101 (L) 150 - 400 K/uL    MPV 11.6 8.9 - 12.9 FL    Nucleated RBCs 0.0 0.0  WBC    nRBC 0.00 0.00 - 0.01 K/uL    Neutrophils % 80 (H) 32 - 75 %    Lymphocytes % 16 12 - 49 %    Monocytes % 3 (L) 5 - 13 %    Eosinophils % 1 0 - 7 %    Basophils % 0 0 - 1 %    Immature Granulocytes % 0 0 - 0.5 %    Neutrophils Absolute 3.7 1.8 - 8.0 K/UL    Lymphocytes Absolute 0.8 0.8 - 3.5 K/UL    Monocytes Absolute 0.1 0.0 - 1.0 K/UL    Eosinophils Absolute 0.0 0.0 - 0.4 K/UL    Basophils Absolute 0.0 0.0 - 0.1 K/UL    Immature Granulocytes Absolute 0.0 0.00 - 0.04 K/UL    Differential Type AUTOMATED     C-Reactive Protein    Collection Time: 05/10/24 12:34 PM   Result Value Ref Range    CRP 2.91 (H) 0.00 - 0.30 mg/dL        Assessment and Plan:     #1 hypernatremia of moderate intensity  -Sodium was 152 on admission which is further worsened to 157, improved today to 152 in response to fluids  -His current free water deficit is about 5 L  -Also has hyperchloremia.  Chloride is 124  -Clinically he seems volume contracted though his chest x-ray shows questionable

## 2024-05-10 NOTE — CARE COORDINATION
CM has left messages for nurse manager and doctor of pt at SNF for guidance regarding picc.     1300 - Provider from [facility] reached out to hospitalist, pt to get PICC and facility prefers him to dc tomorrow morning. CM attempting to confirm plan w/ facility admissions, LVM. Will cont to attempt. Setting dc transport for tomorrow morning.

## 2024-05-10 NOTE — PROGRESS NOTES
Hospitalist Progress Note    NAME:   Bravo Martinez   : 1950   MRN: [id number]     Date/Time: 5/10/2024 2:44 PM  Patient PCP: Enrique Emanuel MD    Estimated discharge date: 24 hours  Barriers: PICC line and transfer back to [facility] course:  Patient is a 73-year-old male quadriplegic with flexion contractures and intellectual disability from [facility] who was admitted on 2024 for sepsis likely secondary to pneumonia with concerns of aspiration.  Blood cultures obtained and patient was initiated on IV vancomycin and Zosyn with infectious disease and pulmonary consult.  CXR revealed mild interstitial edema versus pneumonia.  MRSA by PCR positive, initiate mupirocin to bilateral nares.  Infectious disease evaluated patient and recommended discontinue IV vancomycin for now, but continue IV Zosyn.  Also, agreed with continuing patient's HIV medications.  Pulmonology evaluated patient and recommended with continued empiric antibiotics pending cultures.  Nursing staff having difficulty with administering medications through PEG tube so GI consulted.  GI evaluated patient and recommended holding tube feedings until KUB/[place] completed.  KUB revealed functioning PEG tube.  GI recommended reinitiating feedings at 45 degree position.  Also, will get dietitian consult to assist with tube feeding management.  Also, patient persistently hypernatremic after IV fluids.  Will initiate patient on D5W with nephrology consult.  Likely can make adjustment to PEG tube water flushes to assist with chronic hypernatremia.  Renal ultrasound revealed cyst in the right kidney.  Otherwise unremarkable renal ultrasound.  Enlarged prostate.  Nephrology evaluated patient agreed with initiation of D5W at 75 mL/h.  Also, increase water flushes via PEG tube.  Spoke with provider at [facility], Dr. Emanuel, who has been following the patient for the last 12 years.  Discussed care plan up to this point with

## 2024-05-10 NOTE — PROGRESS NOTES
Progress Note  Date:5/10/2024       Room:[facility]  Patient Name:Bravo Martinez     YOB: 1950     Age:73 y.o.        Subjective    Subjective Patient with HIV infection, followed for suspected aspiration pneumonia on IV Zosyn. Afebrile with decreasing procal. Blood cultures negative so far. No sputum culture.  Patient appears to be resting comfortably but unresponsive.   Plans are to discharge to [facility] on IV Zosyn.     Objective         Vitals Last 24 Hours:  TEMPERATURE:  Temp  Av °F (36.7 °C)  Min: 97.7 °F (36.5 °C)  Max: 98.9 °F (37.2 °C)  RESPIRATIONS RANGE: Resp  Av.3  Min: 17  Max: 20  PULSE OXIMETRY RANGE: SpO2  Av.4 %  Min: 96 %  Max: 99 %  PULSE RANGE: Pulse  Av.5  Min: 79  Max: 89  BLOOD PRESSURE RANGE: Systolic (24hrs), Av , Min:140 , Max:167   ; Diastolic (24hrs), Av, Min:82, Max:89         Objective:  Vital signs: (most recent): Blood pressure (!) 140/82, pulse 82, temperature 97.8 °F (36.6 °C), temperature source Axillary, resp. rate 17, height 1.829 m (6'), weight 117.9 kg (260 lb), SpO2 96 %.    Vitals and nursing note reviewed.   Constitutional:       General: He is not in acute distress.     Appearance: He is ill-appearing.   HENT:      Head: Normocephalic and atraumatic.      Right Ear: External ear normal.      Left Ear: External ear normal.      Nose: Nose normal.      Mouth/Throat:      Mouth: Mucous membranes are dry.      Comments: Edentulous, mouth breathing  Eyes:      Comments: Eyes closed    Cardiovascular:      Rate and Rhythm: Normal rate and regular rhythm.      Heart sounds: No murmur heard.  Pulmonary:      Effort: Pulmonary effort is normal.      Breath sounds: Rhonchi present. No wheezing or rales.   Abdominal:      General: Bowel sounds are normal. There is no distension.      Palpations: Abdomen is soft.      Tenderness: There is no abdominal tenderness.      Comments: PEG Tube   Genitourinary:     Comments: External urinary  Prednisone.   CRP has decreased.       Plan   1.  Continue IV Zosyn for 10 more days at discharge  2.  Continue Truvada and Raltegravir  3.  Continue nasal Mupirocin for 2 more days  5.  In am, repeat CRP  6.  Follow-up RPR, HCV RNA QT, CD4 count, HCV RNA QT    Electronically signed by Onesimo Wells MD

## 2024-05-11 ENCOUNTER — APPOINTMENT (OUTPATIENT)
Facility: HOSPITAL | Age: 74
DRG: 890 | End: 2024-05-11
Payer: MEDICAID

## 2024-05-11 VITALS
SYSTOLIC BLOOD PRESSURE: 123 MMHG | BODY MASS INDEX: 35.21 KG/M2 | HEIGHT: 72 IN | TEMPERATURE: 97.7 F | RESPIRATION RATE: 22 BRPM | HEART RATE: 81 BPM | DIASTOLIC BLOOD PRESSURE: 64 MMHG | WEIGHT: 260 LBS | OXYGEN SATURATION: 97 %

## 2024-05-11 PROBLEM — B20 HIV (HUMAN IMMUNODEFICIENCY VIRUS INFECTION) (HCC): Status: ACTIVE | Noted: 2024-05-11

## 2024-05-11 PROBLEM — N17.9 AKI (ACUTE KIDNEY INJURY) (HCC): Status: ACTIVE | Noted: 2024-05-11

## 2024-05-11 PROBLEM — E56.9 VITAMIN DEFICIENCY: Status: ACTIVE | Noted: 2024-05-11

## 2024-05-11 PROBLEM — F20.9 SCHIZOPHRENIA (HCC): Status: ACTIVE | Noted: 2024-05-11

## 2024-05-11 PROBLEM — F79 INTELLECTUAL DISABILITY: Status: ACTIVE | Noted: 2024-05-11

## 2024-05-11 PROBLEM — E87.0 HYPERNATREMIA: Status: ACTIVE | Noted: 2024-05-11

## 2024-05-11 PROBLEM — E87.8 HYPERCHLOREMIA: Status: ACTIVE | Noted: 2024-05-11

## 2024-05-11 PROBLEM — D69.6 THROMBOCYTOPENIA (HCC): Status: ACTIVE | Noted: 2024-05-11

## 2024-05-11 PROBLEM — K21.9 GERD (GASTROESOPHAGEAL REFLUX DISEASE): Status: ACTIVE | Noted: 2024-05-11

## 2024-05-11 PROBLEM — G82.50 QUADRIPLEGIA (HCC): Status: ACTIVE | Noted: 2024-05-11

## 2024-05-11 PROBLEM — E87.6 HYPOKALEMIA: Status: ACTIVE | Noted: 2024-05-11

## 2024-05-11 LAB
ALBUMIN SERPL-MCNC: 3 G/DL (ref 3.5–5)
ALBUMIN SERPL-MCNC: 3.1 G/DL (ref 3.5–5)
ALBUMIN/GLOB SERPL: 0.7 (ref 1.1–2.2)
ALP SERPL-CCNC: 83 U/L (ref 45–117)
ALT SERPL-CCNC: 43 U/L (ref 12–78)
ANION GAP SERPL CALC-SCNC: 10 MMOL/L (ref 5–15)
ANION GAP SERPL CALC-SCNC: 11 MMOL/L (ref 5–15)
ARTERIAL PATENCY WRIST A: YES
AST SERPL W P-5'-P-CCNC: 48 U/L (ref 15–37)
BASE DEFICIT BLDA-SCNC: 2.8 MMOL/L
BASOPHILS # BLD AUTO: 0 X10E3/UL (ref 0–0.2)
BASOPHILS # BLD: 0 K/UL (ref 0–0.1)
BASOPHILS NFR BLD AUTO: 0 %
BASOPHILS NFR BLD: 0 % (ref 0–1)
BDY SITE: ABNORMAL
BILIRUB SERPL-MCNC: 0.8 MG/DL (ref 0.2–1)
BODY TEMPERATURE: 98.6
BUN SERPL-MCNC: 10 MG/DL (ref 6–20)
BUN SERPL-MCNC: 10 MG/DL (ref 6–20)
BUN/CREAT SERPL: 8 (ref 12–20)
BUN/CREAT SERPL: 9 (ref 12–20)
CA-I BLD-MCNC: 8.6 MG/DL (ref 8.5–10.1)
CA-I BLD-MCNC: 8.7 MG/DL (ref 8.5–10.1)
CD3+CD4+ CELLS # BLD: 170 /UL (ref 359–1519)
CD3+CD4+ CELLS NFR BLD: 21.3 % (ref 30.8–58.5)
CHLORIDE SERPL-SCNC: 119 MMOL/L (ref 97–108)
CHLORIDE SERPL-SCNC: 121 MMOL/L (ref 97–108)
CO2 SERPL-SCNC: 19 MMOL/L (ref 21–32)
CO2 SERPL-SCNC: 20 MMOL/L (ref 21–32)
COHGB MFR BLD: 0.5 % (ref 1–2)
CREAT SERPL-MCNC: 1.14 MG/DL (ref 0.7–1.3)
CREAT SERPL-MCNC: 1.18 MG/DL (ref 0.7–1.3)
CRP SERPL-MCNC: 2.3 MG/DL (ref 0–0.3)
DIFFERENTIAL METHOD BLD: ABNORMAL
EOSINOPHIL # BLD AUTO: 0 X10E3/UL (ref 0–0.4)
EOSINOPHIL # BLD: 0 K/UL (ref 0–0.4)
EOSINOPHIL NFR BLD AUTO: 1 %
EOSINOPHIL NFR BLD: 0 % (ref 0–7)
ERYTHROCYTE [DISTWIDTH] IN BLOOD BY AUTOMATED COUNT: 12.7 % (ref 11.6–15.4)
ERYTHROCYTE [DISTWIDTH] IN BLOOD BY AUTOMATED COUNT: 13 % (ref 11.5–14.5)
FIO2 ON VENT: 21 %
GLOBULIN SER CALC-MCNC: 4.6 G/DL (ref 2–4)
GLUCOSE SERPL-MCNC: 100 MG/DL (ref 65–100)
GLUCOSE SERPL-MCNC: 96 MG/DL (ref 65–100)
HCO3 BLDA-SCNC: 20 MMOL/L (ref 22–26)
HCT VFR BLD AUTO: 38.2 % (ref 37.5–51)
HCT VFR BLD AUTO: 40.9 % (ref 36.6–50.3)
HGB BLD-MCNC: 12.4 G/DL (ref 13–17.7)
HGB BLD-MCNC: 13.9 G/DL (ref 12.1–17)
IMM GRANULOCYTES # BLD AUTO: 0 K/UL
IMM GRANULOCYTES # BLD: 0 X10E3/UL (ref 0–0.1)
IMM GRANULOCYTES NFR BLD AUTO: 0 %
IMM GRANULOCYTES NFR BLD: 1 %
IMMATURE CELLS: ABNORMAL
LYMPHOCYTES # BLD AUTO: 0.8 X10E3/UL (ref 0.7–3.1)
LYMPHOCYTES # BLD: 0.8 K/UL (ref 0.8–3.5)
LYMPHOCYTES NFR BLD AUTO: 18 %
LYMPHOCYTES NFR BLD: 10 % (ref 12–49)
MCH RBC QN AUTO: 30.4 PG (ref 26.6–33)
MCH RBC QN AUTO: 31.4 PG (ref 26–34)
MCHC RBC AUTO-ENTMCNC: 32.5 G/DL (ref 31.5–35.7)
MCHC RBC AUTO-ENTMCNC: 34 G/DL (ref 30–36.5)
MCV RBC AUTO: 92.3 FL (ref 80–99)
MCV RBC AUTO: 94 FL (ref 79–97)
METHGB MFR BLD: 0.2 % (ref 0–1.4)
MONOCYTES # BLD AUTO: 0.2 X10E3/UL (ref 0.1–0.9)
MONOCYTES # BLD: 0.3 K/UL (ref 0–1)
MONOCYTES NFR BLD AUTO: 3 %
MONOCYTES NFR BLD: 4 % (ref 5–13)
MORPHOLOGY BLD-IMP: ABNORMAL
NEUTROPHILS # BLD AUTO: 3.6 X10E3/UL (ref 1.4–7)
NEUTROPHILS NFR BLD AUTO: 77 %
NEUTS BAND NFR BLD MANUAL: 3 % (ref 0–6)
NEUTS SEG # BLD: 7.2 K/UL (ref 1.8–8)
NEUTS SEG NFR BLD: 83 % (ref 32–75)
NRBC # BLD: 0 K/UL (ref 0–0.01)
NRBC BLD AUTO-RTO: ABNORMAL %
NRBC BLD-RTO: 0 PER 100 WBC
OXYHGB MFR BLD: 89.1 % (ref 95–99)
PCO2 BLDA: 29 MMHG (ref 35–45)
PERFORMED BY:: ABNORMAL
PH BLDA: 7.45 (ref 7.35–7.45)
PHOSPHATE SERPL-MCNC: 2.1 MG/DL (ref 2.6–4.7)
PLATELET # BLD AUTO: 107 K/UL (ref 150–400)
PLATELET # BLD AUTO: 108 X10E3/UL (ref 150–450)
PMV BLD AUTO: 12.2 FL (ref 8.9–12.9)
PO2 BLDA: 53 MMHG (ref 80–100)
POTASSIUM SERPL-SCNC: 3.2 MMOL/L (ref 3.5–5.1)
POTASSIUM SERPL-SCNC: 3.3 MMOL/L (ref 3.5–5.1)
PROT SERPL-MCNC: 7.7 G/DL (ref 6.4–8.2)
RBC # BLD AUTO: 4.08 X10E6/UL (ref 4.14–5.8)
RBC # BLD AUTO: 4.43 M/UL (ref 4.1–5.7)
RBC MORPH BLD: ABNORMAL
RPR SER QL: REACTIVE
SAO2 % BLD: 90 % (ref 95–99)
SAO2% DEVICE SAO2% SENSOR NAME: ABNORMAL
SODIUM SERPL-SCNC: 150 MMOL/L (ref 136–145)
SODIUM SERPL-SCNC: 150 MMOL/L (ref 136–145)
SPECIMEN SITE: ABNORMAL
VENTILATION MODE VENT: ABNORMAL
WBC # BLD AUTO: 4.6 X10E3/UL (ref 3.4–10.8)
WBC # BLD AUTO: 8.3 K/UL (ref 4.1–11.1)

## 2024-05-11 PROCEDURE — 2700000000 HC OXYGEN THERAPY PER DAY

## 2024-05-11 PROCEDURE — 86140 C-REACTIVE PROTEIN: CPT

## 2024-05-11 PROCEDURE — 99232 SBSQ HOSP IP/OBS MODERATE 35: CPT | Performed by: INTERNAL MEDICINE

## 2024-05-11 PROCEDURE — 82803 BLOOD GASES ANY COMBINATION: CPT

## 2024-05-11 PROCEDURE — 80069 RENAL FUNCTION PANEL: CPT

## 2024-05-11 PROCEDURE — 6360000002 HC RX W HCPCS: Performed by: STUDENT IN AN ORGANIZED HEALTH CARE EDUCATION/TRAINING PROGRAM

## 2024-05-11 PROCEDURE — 94640 AIRWAY INHALATION TREATMENT: CPT

## 2024-05-11 PROCEDURE — 6370000000 HC RX 637 (ALT 250 FOR IP): Performed by: HOSPITALIST

## 2024-05-11 PROCEDURE — 6370000000 HC RX 637 (ALT 250 FOR IP)

## 2024-05-11 PROCEDURE — 2580000003 HC RX 258: Performed by: HOSPITALIST

## 2024-05-11 PROCEDURE — 6360000002 HC RX W HCPCS: Performed by: HOSPITALIST

## 2024-05-11 PROCEDURE — 71045 X-RAY EXAM CHEST 1 VIEW: CPT

## 2024-05-11 PROCEDURE — 85025 COMPLETE CBC W/AUTO DIFF WBC: CPT

## 2024-05-11 PROCEDURE — 36600 WITHDRAWAL OF ARTERIAL BLOOD: CPT

## 2024-05-11 PROCEDURE — 80053 COMPREHEN METABOLIC PANEL: CPT

## 2024-05-11 PROCEDURE — 36569 INSJ PICC 5 YR+ W/O IMAGING: CPT

## 2024-05-11 PROCEDURE — 6370000000 HC RX 637 (ALT 250 FOR IP): Performed by: INTERNAL MEDICINE

## 2024-05-11 PROCEDURE — 36415 COLL VENOUS BLD VENIPUNCTURE: CPT

## 2024-05-11 PROCEDURE — 02HV33Z INSERTION OF INFUSION DEVICE INTO SUPERIOR VENA CAVA, PERCUTANEOUS APPROACH: ICD-10-PCS | Performed by: HOSPITALIST

## 2024-05-11 RX ORDER — CHOLECALCIFEROL (VITAMIN D3) 25 MCG
1000 TABLET ORAL DAILY
Qty: 60 TABLET | Refills: 0 | Status: SHIPPED | OUTPATIENT
Start: 2024-05-12

## 2024-05-11 RX ORDER — VITAMIN B COMPLEX
1000 TABLET ORAL DAILY
Status: DISCONTINUED | OUTPATIENT
Start: 2024-05-12 | End: 2024-05-11 | Stop reason: HOSPADM

## 2024-05-11 RX ADMIN — ALBUTEROL SULFATE 2.5 MG: 2.5 SOLUTION RESPIRATORY (INHALATION) at 13:59

## 2024-05-11 RX ADMIN — Medication 1000 MCG: at 10:09

## 2024-05-11 RX ADMIN — PREDNISONE 20 MG: 20 TABLET ORAL at 10:10

## 2024-05-11 RX ADMIN — PIPERACILLIN AND TAZOBACTAM 3375 MG: 3; .375 INJECTION, POWDER, LYOPHILIZED, FOR SOLUTION INTRAVENOUS at 04:54

## 2024-05-11 RX ADMIN — THIAMINE HCL TAB 100 MG 50 MG: 100 TAB at 10:09

## 2024-05-11 RX ADMIN — DOCUSATE SODIUM 100 MG: 50 LIQUID ORAL at 10:09

## 2024-05-11 RX ADMIN — EMTRICITABINE AND TENOFOVIR DISOPROXIL FUMARATE 1 TABLET: 200; 300 TABLET, FILM COATED ORAL at 10:09

## 2024-05-11 RX ADMIN — PIPERACILLIN AND TAZOBACTAM 3375 MG: 3; .375 INJECTION, POWDER, LYOPHILIZED, FOR SOLUTION INTRAVENOUS at 13:52

## 2024-05-11 RX ADMIN — RALTEGRAVIR 400 MG: 400 TABLET, FILM COATED ORAL at 10:09

## 2024-05-11 RX ADMIN — LANSOPRAZOLE 30 MG: 30 TABLET, ORALLY DISINTEGRATING ORAL at 10:11

## 2024-05-11 RX ADMIN — POTASSIUM BICARBONATE 40 MEQ: 782 TABLET, EFFERVESCENT ORAL at 13:52

## 2024-05-11 RX ADMIN — MUPIROCIN: 20 OINTMENT TOPICAL at 10:09

## 2024-05-11 RX ADMIN — POTASSIUM BICARBONATE 40 MEQ: 782 TABLET, EFFERVESCENT ORAL at 10:10

## 2024-05-11 RX ADMIN — POTASSIUM & SODIUM PHOSPHATES POWDER PACK 280-160-250 MG 250 MG: 280-160-250 PACK at 13:52

## 2024-05-11 RX ADMIN — ALBUTEROL SULFATE 2.5 MG: 2.5 SOLUTION RESPIRATORY (INHALATION) at 09:01

## 2024-05-11 NOTE — DISCHARGE SUMMARY
Discharge Summary    Name: Bravo Martinez  [id number]  YOB: 1950 (Age: 73 y.o.)   Date of Admission: 5/8/2024  Date of Discharge: 5/11/2024  Attending Physician: Dragan Camacho MD    Discharge Diagnosis:   Principal Problem:    Sepsis (HCC)  Active Problems:    KRISTINE (acute kidney injury) (HCC)    Hypernatremia    Hyperchloremia    Hypokalemia    GERD (gastroesophageal reflux disease)    HIV (human immunodeficiency virus infection) (HCC)    Quadriplegia (HCC)    Schizophrenia (HCC)    Intellectual disability    Thrombocytopenia (HCC)    Vitamin deficiency  Resolved Problems:    * No resolved hospital problems. *       Consultations:  IP CONSULT TO GI  IP CONSULT TO PULMONOLOGY  IP CONSULT TO INFECTIOUS DISEASES  IP CONSULT TO DIETITIAN  IP CONSULT TO NEPHROLOGY  IP CONSULT TO PICC TEAM      Brief Admission History/Reason for Admission Per Uzma Lua MD:   Sepsis secondary to pneumonia    Brief Hospital Course by Main Problems:   Patient is a 73-year-old male quadriplegic with flexion contractures and intellectual disability from [facility] who was admitted on 5/8/2024 for sepsis likely secondary to pneumonia with concerns of aspiration.  Blood cultures obtained and patient was initiated on IV vancomycin and Zosyn with infectious disease and pulmonary consult.  CXR revealed mild interstitial edema versus pneumonia.  MRSA by PCR positive, initiate mupirocin to bilateral nares.  Infectious disease evaluated patient and recommended discontinue IV vancomycin for now, but continue IV Zosyn.  Also, agreed with continuing patient's HIV medications.  Pulmonology evaluated patient and recommended with continued empiric antibiotics pending cultures.  Nursing staff having difficulty with administering medications through PEG tube so GI consulted.  GI evaluated patient and recommended holding tube feedings until KUB/Juventino completed.  KUB revealed functioning PEG  medications  piperacillin-tazobactam  infusion             DISPOSITION:    Home with Family:       Home with HH/PT/OT/RN:    SNF/LTC:    DIANNA:    OTHER: Henry Mcclain     Code status: DNR/DNI  Recommended diet:  PEG tube feedings  Recommended activity: activity as tolerated  Wound care: See surgical/procedure care instructions      Follow up with:   PCP : Enrique Emanuel MD Tran, [place]  [id number] [address] [id number] [phone]    Follow up  Continue routine follow-up with PCP at [facility].  Recommend repeat blood work after completion of IV antibiotics to ensure resolution of pneumonia          Total time in minutes spent coordinating this discharge (includes going over instructions, follow-up, prescriptions, and preparing report for sign off to her PCP) :  35 minutes

## 2024-05-11 NOTE — PROGRESS NOTES
At approximately 0150, pt vomited small amount of greenish gastric content. PEG feeding put on hold. VS taken, Temp: 98.6 F, RR 40/min, , Saturation 92% in room air. BP: 170/89 (pt kept on coughing).  Suctioned orally via younker.     Deep suctioned by RRT and recommended to check ABG as pt might need a BipAp.    Notified Doctor Navya, STAT cxray and ABG has been ordered.    Repeat VS at 0302: BP: 135/80, saturation 92% in RA, , RR:32.  Pt had another episode of vomiting, small amount of greenish gastric content.      Notified Doctor Navya re-cxray result, \" Ok, zosyn will cover it, decrease continues fluid to 50cc/hr\".

## 2024-05-11 NOTE — PROCEDURES
Called to floor to place PICC line who will be discharging on long-term antibiotic therapy.  Patient's primary RN, Sofia, attempted to contact the patient's legal guardian, Frankie Martinez, multiple times to obtain consent for PICC placement, but was unable to reach Mr. Martinez or receive a call back.  In light of this, two physicians signed consent for placement of PICC line, which has been placed in patient's chart.      Patient is noted to have some contractures of the upper extremities on assessment.  Patient still has some limited range of motion, but frequently pulls his arm back when attempt is bad to reposition the upper extremities.  The right arm was first assessed and a large cephalic vein was identified on the anterior aspect, which was of sufficient size and compressibility for placement.  PICC line was inserted sterilely using modified seldinger technique under ultrasound guidance.  Patient was given lidocaine for local anesthetic, which he tolerated well.  Vessel was accessed with good venous blood return.  Guidewire was advanced, but was unable to be fully inserted (10 cm out).  Introducer was placed and guidewire removed.  PICC was trimmed to 35 cm and inserted easily until it reached the shoulder.  Patient required readjustment of this arm at this time to allow the catheter to pass beyond the shoulder.  Catheter was able to be passed and placement was confirmed at this bedside with 3CG showing PICC in the distal SVC/CAJ.  PICC was secured at 0 cm out with stabilization device and covered with CHG dressing.  Patient tolerated procedure well with no obvious adverse effects noted at this time.  Patient's nurse, Sofia, was notified that PICC was okay to use at this time.      REF:  [id number]  LOT:  [id number]  EXP:  01/31/2025    Post insertion arm circumference 35 cm.

## 2024-05-11 NOTE — PLAN OF CARE
Problem: Discharge Planning  Goal: Discharge to home or other facility with appropriate resources  5/11/2024 1442 by Sofia Kimbrough RN  Outcome: Adequate for Discharge  5/11/2024 1442 by Sofia Kimbrough RN  Outcome: Adequate for Discharge  5/11/2024 1442 by Sofia Kimbrough RN  Outcome: Progressing     Problem: Safety - Adult  Goal: Free from fall injury  5/11/2024 1442 by Sofia Kimbrough RN  Outcome: Adequate for Discharge  5/11/2024 1442 by Sofia Kimbrough RN  Outcome: Adequate for Discharge  5/11/2024 1442 by Sofia Kimbrough RN  Outcome: Progressing     Problem: Skin/Tissue Integrity  Goal: Absence of new skin breakdown  Description: 1.  Monitor for areas of redness and/or skin breakdown  2.  Assess vascular access sites hourly  3.  Every 4-6 hours minimum:  Change oxygen saturation probe site  4.  Every 4-6 hours:  If on nasal continuous positive airway pressure, respiratory therapy assess nares and determine need for appliance change or resting period.  5/11/2024 1442 by Sofia Kimbrough RN  Outcome: Adequate for Discharge  5/11/2024 1442 by Sofia Kimbrough RN  Outcome: Adequate for Discharge  5/11/2024 1442 by Sofia Kimbrough RN  Outcome: Progressing     Problem: Pain  Goal: Verbalizes/displays adequate comfort level or baseline comfort level  5/11/2024 1442 by Sofia Kimbrough RN  Outcome: Adequate for Discharge  5/11/2024 1442 by Sofia Kimbrough RN  Outcome: Adequate for Discharge  5/11/2024 1442 by Sofia Kimbrough RN  Outcome: Progressing

## 2024-05-11 NOTE — PROGRESS NOTES
Renal Progress Note    Patient: Bravo Martinez MRN: [id number]  SSN: [id number]    YOB: 1950  Age: 73 y.o.  Sex: male      Admit Date: 5/8/2024    LOS: 3 days     Subjective:   Patient seen at bedside, awake, in no acute distress, to be discharged today.     On palliative care at [facility].     Current Facility-Administered Medications   Medication Dose Route Frequency    potassium & sodium phosphates (PHOS-NAK) 280-160-250 MG packet 250 mg  1 packet Per G Tube 4x Daily    dextrose 5 % solution   IntraVENous Continuous    acetaminophen (TYLENOL) 160 MG/5ML solution 325 mg  325 mg Oral Q6H PRN    vitamin B-12 (CYANOCOBALAMIN) tablet 1,000 mcg  1,000 mcg Oral Daily    docusate (COLACE) 50 MG/5ML liquid 100 mg  100 mg Per G Tube BID    emtricitabine-tenofovir (TRUVADA) 200-300 MG per tablet 1 tablet  1 tablet Oral Daily    fentaNYL (DURAGESIC) 25 MCG/HR 1 patch  1 patch TransDERmal Q72H    predniSONE (DELTASONE) tablet 20 mg  20 mg Oral Daily    raltegravir (ISENTRESS) tablet 400 mg  400 mg Oral BID    senna (SENOKOT) tablet 8.6 mg  1 tablet Per G Tube Nightly    thiamine mononitrate tablet 50 mg  50 mg Oral Daily    lansoprazole (PREVACID SOLUTAB) disintegrating tablet 30 mg  30 mg Per G Tube QAM AC    [Held by provider] enoxaparin (LOVENOX) injection 40 mg  40 mg SubCUTAneous Daily    acetaminophen (TYLENOL) tablet 650 mg  650 mg Oral Q6H PRN    Or    acetaminophen (TYLENOL) suppository 650 mg  650 mg Rectal Q6H PRN    piperacillin-tazobactam (ZOSYN) 3,375 mg in sodium chloride 0.9 % 50 mL IVPB (mini-bag)  3,375 mg IntraVENous Q8H    albuterol (PROVENTIL) nebulizer solution 2.5 mg  2.5 mg Nebulization Q6H WA RT    albuterol sulfate HFA (PROVENTIL;VENTOLIN;PROAIR) 108 (90 Base) MCG/ACT inhaler 2 puff  2 puff Inhalation Q4H PRN    diatrizoate meglumine-sodium (GASTROGRAFIN) 66-10 % solution 30 mL  30 mL PEG Tube ONCE PRN    mupirocin (BACTROBAN) 2 % ointment   Each Nostril BID        Vitals:    05/10/24

## 2024-05-11 NOTE — PROGRESS NOTES
Progress Note  Date:2024       Room:[facility]  Patient Name:Bravo Martinez     YOB: 1950     Age:73 y.o.        Subjective    Subjective Patient with HIV infection, followed for suspected aspiration pneumonia on IV Zosyn. Afebrile with decreasing procal. Blood cultures negative so far. No sputum culture.  Patient has been discharged back to [facility]. He had remained afebrile.  Objective         Vitals Last 24 Hours:  TEMPERATURE:  Temp  Av.2 °F (36.8 °C)  Min: 97.8 °F (36.6 °C)  Max: 98.6 °F (37 °C)  RESPIRATIONS RANGE: Resp  Av  Min: 20  Max: 32  PULSE OXIMETRY RANGE: SpO2  Av.3 %  Min: 92 %  Max: 97 %  PULSE RANGE: Pulse  Av  Min: 82  Max: 90  BLOOD PRESSURE RANGE: Systolic (24hrs), Av , Min:109 , Max:135   ; Diastolic (24hrs), Av, Min:76, Max:89         Objective:  Vital signs: (most recent): Blood pressure 109/76, pulse 82, temperature 97.8 °F (36.6 °C), temperature source Axillary, resp. rate 26, height 1.829 m (6'), weight 117.9 kg (260 lb), SpO2 97 %.    Vitals and nursing note reviewed.  Patient unavailable for re-examination.  Constitutional:       General: He is not in acute distress.     Appearance: He is ill-appearing.   HENT:      Head: Normocephalic and atraumatic.      Right Ear: External ear normal.      Left Ear: External ear normal.      Nose: Nose normal.      Mouth/Throat:      Mouth: Mucous membranes are dry.      Comments: Edentulous, mouth breathing  Eyes:      Comments: Eyes closed    Cardiovascular:      Rate and Rhythm: Normal rate and regular rhythm.      Heart sounds: No murmur heard.  Pulmonary:      Effort: Pulmonary effort is normal.      Breath sounds: Rhonchi present. No wheezing or rales.   Abdominal:      General: Bowel sounds are normal. There is no distension.      Palpations: Abdomen is soft.      Tenderness: There is no abdominal tenderness.      Comments: PEG Tube   Genitourinary:     Comments: External urinary  aspiration pneumonia, etiology undetermined, Day #4 IV Zosyn  Sepsis with fever, tachycardia, leukocytosis and elevated lactic acid, CRP, resolved or resolving  HIV-1 infection with CD4 660/mm3 and undetectable viral load on Truvada and Raltegravir  History of syphilis/neurosyphilis, treated (IV Penicillin),  RPR 1:2, decreasing  Hepatitis C infection, status post Mavyret, undetectable viral load in 2022  History of latent tuberculosis, status post INH 2009  MRSA nasal colonization, Day #4/5 Mupirocin     Comment:  WBC normal today, though he remains on Prednisone.   CRP has decreased.  RPR decreasing.     Plan   1.  Continue IV Zosyn for 10 more days at discharge  2.  Continue Truvada and Raltegravir  3.  Continue nasal Mupirocin for 1 more day  4.  Follow-up blood cultures until finalized  5. Follow-up  HCV RNA QT    Electronically signed by Onesimo Wells MD

## 2024-05-12 LAB
BACTERIA SPEC CULT: NORMAL
BACTERIA SPEC CULT: NORMAL
BASOPHILS # BLD AUTO: 0 X10E3/UL (ref 0–0.2)
BASOPHILS NFR BLD AUTO: 0 %
CD3+CD4+ CELLS # BLD: 205 /UL (ref 359–1519)
CD3+CD4+ CELLS NFR BLD: 29.3 % (ref 30.8–58.5)
EOSINOPHIL # BLD AUTO: 0.1 X10E3/UL (ref 0–0.4)
EOSINOPHIL NFR BLD AUTO: 2 %
ERYTHROCYTE [DISTWIDTH] IN BLOOD BY AUTOMATED COUNT: 12.6 % (ref 11.6–15.4)
HCT VFR BLD AUTO: 35.3 % (ref 37.5–51)
HGB BLD-MCNC: 11.6 G/DL (ref 13–17.7)
HIV1 RNA # SERPL NAA+PROBE: <20 COPIES/ML
HIV1 RNA SERPL NAA+PROBE-LOG#: NORMAL LOG10COPY/ML
IMM GRANULOCYTES # BLD: 0 X10E3/UL (ref 0–0.1)
IMM GRANULOCYTES NFR BLD: 0 %
IMMATURE CELLS: ABNORMAL
LYMPHOCYTES # BLD AUTO: 0.7 X10E3/UL (ref 0.7–3.1)
LYMPHOCYTES NFR BLD AUTO: 11 %
Lab: NORMAL
Lab: NORMAL
MCH RBC QN AUTO: 31.3 PG (ref 26.6–33)
MCHC RBC AUTO-ENTMCNC: 32.9 G/DL (ref 31.5–35.7)
MCV RBC AUTO: 95 FL (ref 79–97)
MONOCYTES # BLD AUTO: 0.3 X10E3/UL (ref 0.1–0.9)
MONOCYTES NFR BLD AUTO: 4 %
MORPHOLOGY BLD-IMP: ABNORMAL
NEUTROPHILS # BLD AUTO: 5.6 X10E3/UL (ref 1.4–7)
NEUTROPHILS NFR BLD AUTO: 83 %
NRBC BLD AUTO-RTO: ABNORMAL %
PLATELET # BLD AUTO: 75 X10E3/UL (ref 150–450)
RBC # BLD AUTO: 3.71 X10E6/UL (ref 4.14–5.8)
RPR SER-TITR: ABNORMAL TITER
T PALLIDUM AB SER QL IA: ABNORMAL
TREPONEMA PALLIDUM IGG+IGM AB [PRESENCE] IN SERUM OR PLASMA BY IMMUNOASSAY: REACTIVE
WBC # BLD AUTO: 6.8 X10E3/UL (ref 3.4–10.8)

## 2024-05-13 LAB
RPR SER QL: REACTIVE
RPR SER-TITR: ABNORMAL TITER
T PALLIDUM AB SER QL IA: ABNORMAL
TREPONEMA PALLIDUM IGG+IGM AB [PRESENCE] IN SERUM OR PLASMA BY IMMUNOASSAY: REACTIVE

## 2024-05-14 LAB
BACTERIA SPEC CULT: NORMAL
BACTERIA SPEC CULT: NORMAL
Lab: NORMAL
Lab: NORMAL

## 2024-07-08 NOTE — PROGRESS NOTES
Comprehensive Nutrition Assessment    Type and Reason for Visit: Reassess    Nutrition Recommendations/Plan:   Continue current TF regimen    Nutrition Assessment:  TF initiated 8/31; achieved goal rate next day. Has remained at goal rate with limited residuals. RN confirms good tolerance. . Meds: Prednisone, vanc, thiamine    Malnutrition Assessment:  Malnutrition Status:  No malnutrition        Estimated Daily Nutrient Needs:  Energy (kcal):  2350kcal/day (28kcal/kg)  Protein (g):  84g protein (1g/kg)       Fluid (ml/day):  2375mL/day (1mL/kg)    Nutrition Related Findings:  Unable to complete NFPE. Appears nourished per RN. Last BM documented 1x week ago. Wounds:    None       Current Nutrition Therapies:   Current Tube Feeding (TF) Orders:   · Feeding Route: PEG  · Formula: Jevity 1.5  · Schedule:Cyclic    · Regimen: 09TP/DG x20 hours  · Additives/Modulars:    · Water Flushes: 250mL Free water q4hr  · Current TF & Flush Orders Provides: 2400kcal;, 102g protein, 2716mL fluid  · Goal TF & Flush Orders Provides:        Anthropometric Measures:  · Height:  5' 10\" (177.8 cm)  · Current Body Wt:  84 kg (185 lb 3 oz)   · Admission Body Wt:  195 lb 8.8 oz    · Ideal Body Wt:  166 lbs:  111.6 %   · Adjusted Body Weight:   ; Weight Adjustment for: No adjustment   · BMI Category: Overweight (BMI 25.0-29. 9)       Nutrition Diagnosis:   · Increased nutrient needs related to catabolic illness as evidenced by (HIV)    Nutrition Interventions:   Food and/or Nutrient Delivery: Continue NPO, Continue tube feeding  Nutrition Education and Counseling: No recommendations at this time  Coordination of Nutrition Care: No recommendation at this time    Goals:  Meet >75% EENs via TF x7 days, Wt maintenance +/- 0.5kg per week       Nutrition Monitoring and Evaluation:   Physical Signs/Symptoms Outcomes: Weight    Discharge Planning:    Continue oral nutrition supplement     Electronically signed by Lamont Marie on Ice or heat may help ease your pain. Either one may help stop a spasm, but most people find that heat is more helpful.  Soak the sore area in warm water or using a heating pad can help stop the spasm and lower pain. Heat also helps muscles stretch easier. Do not leave a heating pad on more than 20 minutes at a time. Be sure to check your skin while the heating pad is on to avoid burns. Never go to sleep with a heating pad on.  Putting ice on a muscle that is in spasm can help ease the spasm and reduce pain. Use an ice pack or bag of frozen vegetables wrapped in a towel over the painful part. Never put ice right on the skin. Do not leave the ice on more than 10 to 15 minutes at a time. Do not try to stretch the muscle right after icing.  Massaging the cramping muscle with firm pressure may help ease the spasm.  Drinking extra fluids can help muscle spasms if they are caused by a loss of body fluids. Avoid intense exercise in hot and humid weather to lower the chance of getting muscle spasms.  Sometimes, you may get muscle spasms if you dont get enough of certain nutrients in your diet, like potassium, magnesium, or carbohydrates. If this is the case, changing your diet can help you to avoid muscle cramps. Talk to your doctor about what to eat and drink before and after exercise.  You may want to take medicine like ibuprofen, naproxen, or acetaminophen to help with pain.  If you were prescribed a narcotic medication, do not drive or operate heavy equipment or machinery while taking these medications.  Please follow up with your primary care doctor or specialist as needed.  Please return here or go to the Emergency Department for any concerns or worsening of condition.    9/18/2020 at 4:06 PM    Contact: